# Patient Record
Sex: MALE | Race: WHITE | NOT HISPANIC OR LATINO | Employment: OTHER | URBAN - NONMETROPOLITAN AREA
[De-identification: names, ages, dates, MRNs, and addresses within clinical notes are randomized per-mention and may not be internally consistent; named-entity substitution may affect disease eponyms.]

---

## 2021-03-02 ENCOUNTER — IMMUNIZATION (OUTPATIENT)
Dept: VACCINE CLINIC | Facility: HOSPITAL | Age: 66
End: 2021-03-02

## 2021-03-02 PROCEDURE — 0001A: CPT | Performed by: INTERNAL MEDICINE

## 2021-03-02 PROCEDURE — 91300 HC SARSCOV02 VAC 30MCG/0.3ML IM: CPT | Performed by: INTERNAL MEDICINE

## 2021-03-23 ENCOUNTER — IMMUNIZATION (OUTPATIENT)
Dept: VACCINE CLINIC | Facility: HOSPITAL | Age: 66
End: 2021-03-23

## 2021-03-23 PROCEDURE — 0002A: CPT | Performed by: INTERNAL MEDICINE

## 2021-03-23 PROCEDURE — 91300 HC SARSCOV02 VAC 30MCG/0.3ML IM: CPT | Performed by: INTERNAL MEDICINE

## 2021-06-09 ENCOUNTER — TRANSCRIBE ORDERS (OUTPATIENT)
Dept: ADMINISTRATIVE | Facility: HOSPITAL | Age: 66
End: 2021-06-09

## 2021-06-09 DIAGNOSIS — R07.9 CHEST PAIN, UNSPECIFIED TYPE: Primary | ICD-10-CM

## 2021-07-12 ENCOUNTER — HOSPITAL ENCOUNTER (OUTPATIENT)
Dept: CARDIOLOGY | Facility: HOSPITAL | Age: 66
Discharge: HOME | End: 2021-07-12

## 2021-07-12 ENCOUNTER — HOSPITAL ENCOUNTER (OUTPATIENT)
Dept: NUCLEAR MEDICINE | Facility: HOSPITAL | Age: 66
Discharge: HOME | End: 2021-07-12

## 2021-07-12 DIAGNOSIS — R07.9 CHEST PAIN, UNSPECIFIED TYPE: ICD-10-CM

## 2021-07-12 LAB
BH CV ECHO MEAS - % IVS THICK: -17.5 %
BH CV ECHO MEAS - % LVPW THICK: -12.8 %
BH CV ECHO MEAS - ACS: 1.3 CM
BH CV ECHO MEAS - AI DEC SLOPE: 155 CM/SEC^2
BH CV ECHO MEAS - AI MAX PG: 47.6 MMHG
BH CV ECHO MEAS - AI MAX VEL: 345 CM/SEC
BH CV ECHO MEAS - AI P1/2T: 651.9 MSEC
BH CV ECHO MEAS - AO MAX PG (FULL): 62.2 MMHG
BH CV ECHO MEAS - AO MAX PG: 66.6 MMHG
BH CV ECHO MEAS - AO MEAN PG (FULL): 32 MMHG
BH CV ECHO MEAS - AO MEAN PG: 34 MMHG
BH CV ECHO MEAS - AO ROOT AREA (BSA CORRECTED): 1.5
BH CV ECHO MEAS - AO ROOT AREA: 9.6 CM^2
BH CV ECHO MEAS - AO ROOT DIAM: 3.5 CM
BH CV ECHO MEAS - AO V2 MAX: 408 CM/SEC
BH CV ECHO MEAS - AO V2 MEAN: 269.5 CM/SEC
BH CV ECHO MEAS - AO V2 VTI: 93.2 CM
BH CV ECHO MEAS - AVA(I,A): 0.76 CM^2
BH CV ECHO MEAS - AVA(I,D): 0.76 CM^2
BH CV ECHO MEAS - AVA(V,A): 0.81 CM^2
BH CV ECHO MEAS - AVA(V,D): 0.81 CM^2
BH CV ECHO MEAS - BSA(HAYCOCK): 2.4 M^2
BH CV ECHO MEAS - BSA: 2.4 M^2
BH CV ECHO MEAS - BZI_BMI: 30.2 KILOGRAMS/M^2
BH CV ECHO MEAS - BZI_METRIC_HEIGHT: 190.5 CM
BH CV ECHO MEAS - BZI_METRIC_WEIGHT: 109.8 KG
BH CV ECHO MEAS - EDV(CUBED): 93 ML
BH CV ECHO MEAS - EDV(MOD-SP4): 98.8 ML
BH CV ECHO MEAS - EDV(TEICH): 93.9 ML
BH CV ECHO MEAS - EF(CUBED): 64.3 %
BH CV ECHO MEAS - EF(MOD-SP4): 57.7 %
BH CV ECHO MEAS - EF(TEICH): 55.9 %
BH CV ECHO MEAS - ESV(CUBED): 33.2 ML
BH CV ECHO MEAS - ESV(MOD-SP4): 41.8 ML
BH CV ECHO MEAS - ESV(TEICH): 41.4 ML
BH CV ECHO MEAS - FS: 29 %
BH CV ECHO MEAS - IVS/LVPW: 0.89
BH CV ECHO MEAS - IVSD: 1.3 CM
BH CV ECHO MEAS - IVSS: 1.1 CM
BH CV ECHO MEAS - LA DIMENSION: 3.5 CM
BH CV ECHO MEAS - LA/AO: 0.99
BH CV ECHO MEAS - LV DIASTOLIC VOL/BSA (35-75): 41.5 ML/M^2
BH CV ECHO MEAS - LV MASS(C)D: 241.6 GRAMS
BH CV ECHO MEAS - LV MASS(C)DI: 101.5 GRAMS/M^2
BH CV ECHO MEAS - LV MASS(C)S: 114 GRAMS
BH CV ECHO MEAS - LV MASS(C)SI: 47.9 GRAMS/M^2
BH CV ECHO MEAS - LV MAX PG: 4.4 MMHG
BH CV ECHO MEAS - LV MEAN PG: 2 MMHG
BH CV ECHO MEAS - LV SYSTOLIC VOL/BSA (12-30): 17.6 ML/M^2
BH CV ECHO MEAS - LV V1 MAX: 105 CM/SEC
BH CV ECHO MEAS - LV V1 MEAN: 68.7 CM/SEC
BH CV ECHO MEAS - LV V1 VTI: 22.6 CM
BH CV ECHO MEAS - LVIDD: 4.5 CM
BH CV ECHO MEAS - LVIDS: 3.2 CM
BH CV ECHO MEAS - LVLD AP4: 7.8 CM
BH CV ECHO MEAS - LVLS AP4: 6.1 CM
BH CV ECHO MEAS - LVOT AREA (M): 3.1 CM^2
BH CV ECHO MEAS - LVOT AREA: 3.1 CM^2
BH CV ECHO MEAS - LVOT DIAM: 2 CM
BH CV ECHO MEAS - LVPWD: 1.4 CM
BH CV ECHO MEAS - LVPWS: 1.3 CM
BH CV ECHO MEAS - MV A MAX VEL: 99.4 CM/SEC
BH CV ECHO MEAS - MV E MAX VEL: 88.3 CM/SEC
BH CV ECHO MEAS - MV E/A: 0.89
BH CV ECHO MEAS - PA ACC TIME: 0.1 SEC
BH CV ECHO MEAS - PA PR(ACCEL): 36.3 MMHG
BH CV ECHO MEAS - SI(AO): 376.8 ML/M^2
BH CV ECHO MEAS - SI(CUBED): 25.1 ML/M^2
BH CV ECHO MEAS - SI(LVOT): 29.8 ML/M^2
BH CV ECHO MEAS - SI(MOD-SP4): 24 ML/M^2
BH CV ECHO MEAS - SI(TEICH): 22.1 ML/M^2
BH CV ECHO MEAS - SV(AO): 896.7 ML
BH CV ECHO MEAS - SV(CUBED): 59.7 ML
BH CV ECHO MEAS - SV(LVOT): 71 ML
BH CV ECHO MEAS - SV(MOD-SP4): 57 ML
BH CV ECHO MEAS - SV(TEICH): 52.5 ML
BH CV NUCLEAR PRIOR STUDY: 3
BH CV REST NUCLEAR ISOTOPE DOSE: 10.3 MCI
BH CV STRESS BP STAGE 1: NORMAL
BH CV STRESS BP STAGE 2: NORMAL
BH CV STRESS COMMENTS STAGE 1: NORMAL
BH CV STRESS COMMENTS STAGE 2: NORMAL
BH CV STRESS DOSE REGADENOSON STAGE 1: 0.4
BH CV STRESS DURATION MIN STAGE 1: 0
BH CV STRESS DURATION MIN STAGE 2: 4
BH CV STRESS DURATION SEC STAGE 1: 10
BH CV STRESS DURATION SEC STAGE 2: 0
BH CV STRESS HR STAGE 1: 78
BH CV STRESS HR STAGE 2: 79
BH CV STRESS NUCLEAR ISOTOPE DOSE: 30.3 MCI
BH CV STRESS PROTOCOL 1: NORMAL
BH CV STRESS RECOVERY BP: NORMAL MMHG
BH CV STRESS RECOVERY HR: 70 BPM
BH CV STRESS STAGE 1: 1
BH CV STRESS STAGE 2: 2
LV EF NUC BP: 60 %
MAXIMAL PREDICTED HEART RATE: 153 BPM
MAXIMAL PREDICTED HEART RATE: 153 BPM
PERCENT MAX PREDICTED HR: 51.63 %
STRESS BASELINE BP: NORMAL MMHG
STRESS BASELINE HR: 61 BPM
STRESS PERCENT HR: 61 %
STRESS POST PEAK BP: NORMAL MMHG
STRESS POST PEAK HR: 79 BPM
STRESS TARGET HR: 130 BPM
STRESS TARGET HR: 130 BPM

## 2021-07-12 PROCEDURE — 93306 TTE W/DOPPLER COMPLETE: CPT | Performed by: INTERNAL MEDICINE

## 2021-07-12 PROCEDURE — 93016 CV STRESS TEST SUPVJ ONLY: CPT | Performed by: INTERNAL MEDICINE

## 2021-07-12 PROCEDURE — 0 TECHNETIUM SESTAMIBI: Performed by: INTERNAL MEDICINE

## 2021-07-12 PROCEDURE — 93306 TTE W/DOPPLER COMPLETE: CPT

## 2021-07-12 PROCEDURE — A9500 TC99M SESTAMIBI: HCPCS | Performed by: INTERNAL MEDICINE

## 2021-07-12 PROCEDURE — 78452 HT MUSCLE IMAGE SPECT MULT: CPT | Performed by: INTERNAL MEDICINE

## 2021-07-12 PROCEDURE — 78452 HT MUSCLE IMAGE SPECT MULT: CPT

## 2021-07-12 PROCEDURE — 25010000002 REGADENOSON 0.4 MG/5ML SOLUTION: Performed by: INTERNAL MEDICINE

## 2021-07-12 PROCEDURE — 93017 CV STRESS TEST TRACING ONLY: CPT

## 2021-07-12 PROCEDURE — 93018 CV STRESS TEST I&R ONLY: CPT | Performed by: INTERNAL MEDICINE

## 2021-07-12 RX ADMIN — REGADENOSON 0.4 MG: 0.08 INJECTION, SOLUTION INTRAVENOUS at 09:45

## 2021-07-12 RX ADMIN — TECHNETIUM TC 99M SESTAMIBI 1 DOSE: 1 INJECTION INTRAVENOUS at 09:45

## 2021-07-12 RX ADMIN — TECHNETIUM TC 99M SESTAMIBI 1 DOSE: 1 INJECTION INTRAVENOUS at 08:40

## 2021-07-29 ENCOUNTER — HOSPITAL ENCOUNTER (OUTPATIENT)
Dept: GENERAL RADIOLOGY | Facility: HOSPITAL | Age: 66
Discharge: HOME | End: 2021-07-29
Admitting: INTERNAL MEDICINE

## 2021-07-29 ENCOUNTER — TRANSCRIBE ORDERS (OUTPATIENT)
Dept: OTHER | Facility: OTHER | Age: 66
End: 2021-07-29

## 2021-07-29 DIAGNOSIS — I38 VALVULAR HEART DISEASE: ICD-10-CM

## 2021-07-29 DIAGNOSIS — I38 VALVULAR HEART DISEASE: Primary | ICD-10-CM

## 2021-07-29 PROCEDURE — 71046 X-RAY EXAM CHEST 2 VIEWS: CPT

## 2021-07-29 PROCEDURE — 71046 X-RAY EXAM CHEST 2 VIEWS: CPT | Performed by: RADIOLOGY

## 2021-08-17 NOTE — H&P (VIEW-ONLY)
"[facility] at [facility]  INITIAL OFFICE CONSULT    Ron Villa  : 1955  MRN:[id number]  Patient Address: [address] [place] [address] [address] [place] [address]    Date of Encounter: 2021    PCP: Geovanni Greene MD  [address] [facility]  [place] [address]  Referring MD: Dr. Greene     IDENTIFICATION: A 66 y.o. male resident of [place], [place]     Chief Complaint   Patient presents with   • Chest Pain     Consult   • Shortness of Breath     PROBLEM LIST:   1. Aortic stenosis  a. \"Heart murmur\" x 20 years   b. Symptoms of worsening chest pain and shortness of breath summer 2021  c. Stress MPS 21: normal LVEF, small-sized mild degree of ischemia located in inferior wall; \"low-risk study\"  d. Echo 21: EF 66-70%, moderate calcification of the aortic valve, severe AS (peak velocity 408cm/sec, mean gradient 34mmHg, peak gradient 67 mmHg), mild MR   2. Osteoarthritis   3. DANNY on CPAP     ALLERGIES: No Known Allergies    Current Outpatient Medications   Medication Instructions   • aspirin 81 mg, Oral, Daily   • cholecalciferol (VITAMIN D3) 1,000 Units, Oral, Daily   • fexofenadine (ALLEGRA) 180 mg, Oral, Daily   • fish oil 1,000 mg, Oral, Daily With Breakfast   • meloxicam (MOBIC) 7.5 mg, Oral, 2 Times Daily   • vitamin B-12 (CYANOCOBALAMIN) 1,000 mcg, Oral, Daily   • vitamin C (ASCORBIC ACID) 250 mg, Oral, Daily     HPI: Mr. Villa is a pleasant 67 y/o WM with above noted history who is referred in consultation today for aortic stenosis. He has been told of a heart murmur for about 20 years, and reports a remote heart cath over 10 years ago which was normal. Records of this are unavailable at this time. He has been experiencing chest pain and exertional dyspnea for 6 months to a year, however this has progressively gotten worse and more frequent, and now occurs at rest. He recently underwent a stress MPS and echo which demonstrated criteria for severe AS, normal LVEF. Stress test showed small " "area of ischemia in the inferior wall. He notes his chest pain used to happen mostly with activity, however last night it occurred while he was sitting eating dinner. His pain resolves within <5 minutes. He denies syncope or pre-syncope and no heart failure symptoms. He denies prior history of cardiovascular, cerebrovascular or peripheral vascular disease. No tobacco, moderate alcohol use, and no drug use.     Cardiac Risk Factors include: advanced age (older than 55 for men, 65 for women), male gender, obesity (BMI >= 30 kg/m2) and sedentary lifestyle    ROS: All systems have been reviewed and are negative with the exception of those mentioned in the HPI and problem list above.    Past Surgical History:   Procedure Laterality Date   • HERNIA REPAIR       Social History     Socioeconomic History   • Marital status:    Tobacco Use   • Smoking status: Former Smoker     Types: Cigarettes     Quit date:      Years since quittin.6   • Smokeless tobacco: Current User     Types: Chew   Vaping Use   • Vaping Use: Never used   Substance and Sexual Activity   • Alcohol use: Yes     Comment: 2 qd   • Drug use: Not Currently   • Sexual activity: Defer     Family History   Problem Relation Age of Onset   • Heart disease Mother    • Heart attack Father        Objective     Vitals:    21 1110 21 1111 21 1112 21 1113   BP: 137/82 134/79 133/81 137/97   BP Location: Left arm Left arm Right arm Right arm   Patient Position: Sitting Standing Sitting Standing   Pulse: 63 65 62 67   SpO2: 96%      Weight: 113 kg (250 lb)      Height: 190.5 cm (75\")        Body mass index is 31.25 kg/m².    PHYSICAL EXAM:  CONSTITUTIONAL: Well nourished, cooperative, in no acute distress  HEENT: Normocephalic, atraumatic, PERRLA, no JVD  CARDIOVASCULAR:  Regular rhythm and normal rate, 3/6 systolic murmur, no gallop or rub.   RESPIRATORY: Clear to auscultation, normal respiratory effort, no wheezing, rales or " sanamgonzález  GI: Soft, nontender, normal bowel sounds  EXTREMITIES: No gross deformities, trace pre-tibial edema. Peripheral pulses are present and equal bilaterally  SKIN: Warm, dry. No bleeding, bruising or rash  NEUROLOGICAL: No focal deficits  PSYCHIATRIC: Normal mood and affect. Behavior is normal     Labs/Diagnostic Data  Labs 3/2020:  TSH 1.830, Free T4 1.3  Lipid Panel: , HDL 32, LDL 83, Trig 229  CMP: Glucose 90, Na 142, K 4.8, Cl 105, CO2 22, BUN 14, Cr 1.11, eGFR 69, Alk Phos 45, AST 22, ALT 28  CBC: WBC 4.8, RBC 5.12, Hgb 16, Hct 48, Plt 207      ECG 12 Lead    Date/Time: 8/18/2021 12:36 PM  Performed by: Janette Dunn PA-C  Authorized by: Janette Dunn PA-C   Comparison: not compared with previous ECG   Previous ECG: no previous ECG available  Rhythm: sinus rhythm  Rate: normal  BPM: 63  Conduction: conduction normal  QRS axis: normal    Clinical impression: normal ECG        Radiology Data:   CXR 7/29/21:  IMPRESSION:  No evidence of active disease in the chest.    Assessment and Plan:     1. Aortic stenosis: echo criteria consistent with severe AS. He has worsening symptoms of chest pain and exertional dyspnea as well as less frequent rest SOA and chest disconfort and needs a cardiac cath for further evaluation prior to probable valve replacement.  We will proceed with right and C next week. He has rest symptoms of chest pain which are bothersome. Long tald with the patient and his wife re TAVR and SAVR.  2. Osteoarthritis: on Mobic. We will re-check renal function at time of cath.  3. Final disposition to follow.       Scribed for Power Burgos MD by Janette Dunn PA-C. 8/18/2021  11:50 EDT

## 2021-08-17 NOTE — PROGRESS NOTES
"[facility] at [facility]  INITIAL OFFICE CONSULT    Ron Villa  : 1955  MRN:[id number]  Patient Address: [address] [place] [address] [place] [address]    Date of Encounter: 2021    PCP: Geovanni Greene MD  [address] [facility]  [place] [address]  Referring MD: Dr. Greene     IDENTIFICATION: A 66 y.o. male resident of [place], [place]     Chief Complaint   Patient presents with   • Chest Pain     Consult   • Shortness of Breath     PROBLEM LIST:   1. Aortic stenosis  a. \"Heart murmur\" x 20 years   b. Symptoms of worsening chest pain and shortness of breath summer 2021  c. Stress MPS 21: normal LVEF, small-sized mild degree of ischemia located in inferior wall; \"low-risk study\"  d. Echo 21: EF 66-70%, moderate calcification of the aortic valve, severe AS (peak velocity 408cm/sec, mean gradient 34mmHg, peak gradient 67 mmHg), mild MR   2. Osteoarthritis   3. DANNY on CPAP     ALLERGIES: No Known Allergies    Current Outpatient Medications   Medication Instructions   • aspirin 81 mg, Oral, Daily   • cholecalciferol (VITAMIN D3) 1,000 Units, Oral, Daily   • fexofenadine (ALLEGRA) 180 mg, Oral, Daily   • fish oil 1,000 mg, Oral, Daily With Breakfast   • meloxicam (MOBIC) 7.5 mg, Oral, 2 Times Daily   • vitamin B-12 (CYANOCOBALAMIN) 1,000 mcg, Oral, Daily   • vitamin C (ASCORBIC ACID) 250 mg, Oral, Daily     HPI: Mr. Villa is a pleasant 67 y/o WM with above noted history who is referred in consultation today for aortic stenosis. He has been told of a heart murmur for about 20 years, and reports a remote heart cath over 10 years ago which was normal. Records of this are unavailable at this time. He has been experiencing chest pain and exertional dyspnea for 6 months to a year, however this has progressively gotten worse and more frequent, and now occurs at rest. He recently underwent a stress MPS and echo which demonstrated criteria for severe AS, normal LVEF. Stress test showed small " "area of ischemia in the inferior wall. He notes his chest pain used to happen mostly with activity, however last night it occurred while he was sitting eating dinner. His pain resolves within <5 minutes. He denies syncope or pre-syncope and no heart failure symptoms. He denies prior history of cardiovascular, cerebrovascular or peripheral vascular disease. No tobacco, moderate alcohol use, and no drug use.     Cardiac Risk Factors include: advanced age (older than 55 for men, 65 for women), male gender, obesity (BMI >= 30 kg/m2) and sedentary lifestyle    ROS: All systems have been reviewed and are negative with the exception of those mentioned in the HPI and problem list above.    Past Surgical History:   Procedure Laterality Date   • HERNIA REPAIR       Social History     Socioeconomic History   • Marital status:    Tobacco Use   • Smoking status: Former Smoker     Types: Cigarettes     Quit date:      Years since quittin.6   • Smokeless tobacco: Current User     Types: Chew   Vaping Use   • Vaping Use: Never used   Substance and Sexual Activity   • Alcohol use: Yes     Comment: 2 qd   • Drug use: Not Currently   • Sexual activity: Defer     Family History   Problem Relation Age of Onset   • Heart disease Mother    • Heart attack Father        Objective     Vitals:    21 1110 21 1111 21 1112 21 1113   BP: 137/82 134/79 133/81 137/97   BP Location: Left arm Left arm Right arm Right arm   Patient Position: Sitting Standing Sitting Standing   Pulse: 63 65 62 67   SpO2: 96%      Weight: 113 kg (250 lb)      Height: 190.5 cm (75\")        Body mass index is 31.25 kg/m².    PHYSICAL EXAM:  CONSTITUTIONAL: Well nourished, cooperative, in no acute distress  HEENT: Normocephalic, atraumatic, PERRLA, no JVD  CARDIOVASCULAR:  Regular rhythm and normal rate, 3/6 systolic murmur, no gallop or rub.   RESPIRATORY: Clear to auscultation, normal respiratory effort, no wheezing, rales or " sanamgonzález  GI: Soft, nontender, normal bowel sounds  EXTREMITIES: No gross deformities, trace pre-tibial edema. Peripheral pulses are present and equal bilaterally  SKIN: Warm, dry. No bleeding, bruising or rash  NEUROLOGICAL: No focal deficits  PSYCHIATRIC: Normal mood and affect. Behavior is normal     Labs/Diagnostic Data  Labs 3/2020:  TSH 1.830, Free T4 1.3  Lipid Panel: , HDL 32, LDL 83, Trig 229  CMP: Glucose 90, Na 142, K 4.8, Cl 105, CO2 22, BUN 14, Cr 1.11, eGFR 69, Alk Phos 45, AST 22, ALT 28  CBC: WBC 4.8, RBC 5.12, Hgb 16, Hct 48, Plt 207      ECG 12 Lead    Date/Time: 8/18/2021 12:36 PM  Performed by: Janette Dunn PA-C  Authorized by: Janette Dunn PA-C   Comparison: not compared with previous ECG   Previous ECG: no previous ECG available  Rhythm: sinus rhythm  Rate: normal  BPM: 63  Conduction: conduction normal  QRS axis: normal    Clinical impression: normal ECG        Radiology Data:   CXR 7/29/21:  IMPRESSION:  No evidence of active disease in the chest.    Assessment and Plan:     1. Aortic stenosis: echo criteria consistent with severe AS. He has worsening symptoms of chest pain and exertional dyspnea as well as less frequent rest SOA and chest disconfort and needs a cardiac cath for further evaluation prior to probable valve replacement.  We will proceed with right and C next week. He has rest symptoms of chest pain which are bothersome. Long tald with the patient and his wife re TAVR and SAVR.  2. Osteoarthritis: on Mobic. We will re-check renal function at time of cath.  3. Final disposition to follow.       Scribed for Power Burgos MD by Janette Dunn PA-C. 8/18/2021  11:50 EDT

## 2021-08-18 ENCOUNTER — OFFICE VISIT (OUTPATIENT)
Dept: CARDIOLOGY | Facility: CLINIC | Age: 66
End: 2021-08-18

## 2021-08-18 ENCOUNTER — TRANSCRIBE ORDERS (OUTPATIENT)
Dept: ADMINISTRATIVE | Facility: HOSPITAL | Age: 66
End: 2021-08-18

## 2021-08-18 VITALS
HEIGHT: 75 IN | WEIGHT: 250 LBS | OXYGEN SATURATION: 96 % | DIASTOLIC BLOOD PRESSURE: 97 MMHG | BODY MASS INDEX: 31.08 KG/M2 | SYSTOLIC BLOOD PRESSURE: 137 MMHG | HEART RATE: 67 BPM

## 2021-08-18 DIAGNOSIS — Z01.818 OTHER SPECIFIED PRE-OPERATIVE EXAMINATION: Primary | ICD-10-CM

## 2021-08-18 DIAGNOSIS — R07.9 CHEST PAIN, UNSPECIFIED TYPE: ICD-10-CM

## 2021-08-18 DIAGNOSIS — I35.0 AORTIC STENOSIS, SEVERE: Primary | ICD-10-CM

## 2021-08-18 DIAGNOSIS — R06.09 EXERTIONAL DYSPNEA: ICD-10-CM

## 2021-08-18 PROCEDURE — 93000 ELECTROCARDIOGRAM COMPLETE: CPT | Performed by: INTERNAL MEDICINE

## 2021-08-18 PROCEDURE — 99204 OFFICE O/P NEW MOD 45 MIN: CPT | Performed by: INTERNAL MEDICINE

## 2021-08-18 RX ORDER — MELOXICAM 7.5 MG/1
7.5 TABLET ORAL DAILY
COMMUNITY
Start: 2021-07-16 | End: 2022-01-21

## 2021-08-18 RX ORDER — MULTIVIT WITH MINERALS/LUTEIN
500 TABLET ORAL DAILY
COMMUNITY

## 2021-08-18 RX ORDER — FEXOFENADINE HCL 180 MG/1
180 TABLET ORAL DAILY
COMMUNITY
Start: 2021-07-16

## 2021-08-18 RX ORDER — LANOLIN ALCOHOL/MO/W.PET/CERES
1000 CREAM (GRAM) TOPICAL DAILY
COMMUNITY

## 2021-08-18 RX ORDER — ASPIRIN 81 MG/1
81 TABLET ORAL DAILY
COMMUNITY

## 2021-08-24 ENCOUNTER — TRANSCRIBE ORDERS (OUTPATIENT)
Dept: ADMINISTRATIVE | Facility: HOSPITAL | Age: 66
End: 2021-08-24

## 2021-08-24 ENCOUNTER — LAB (OUTPATIENT)
Dept: LAB | Facility: HOSPITAL | Age: 66
End: 2021-08-24

## 2021-08-24 DIAGNOSIS — Z01.818 OTHER SPECIFIED PRE-OPERATIVE EXAMINATION: ICD-10-CM

## 2021-08-24 LAB — SARS-COV-2 RNA PNL SPEC NAA+PROBE: NOT DETECTED

## 2021-08-24 PROCEDURE — U0004 COV-19 TEST NON-CDC HGH THRU: HCPCS | Performed by: INTERNAL MEDICINE

## 2021-08-24 PROCEDURE — C9803 HOPD COVID-19 SPEC COLLECT: HCPCS

## 2021-08-26 ENCOUNTER — PREP FOR SURGERY (OUTPATIENT)
Dept: OTHER | Facility: HOSPITAL | Age: 66
End: 2021-08-26

## 2021-08-26 RX ORDER — NITROGLYCERIN 0.4 MG/1
0.4 TABLET SUBLINGUAL
Status: CANCELLED | OUTPATIENT
Start: 2021-08-26

## 2021-08-26 RX ORDER — SODIUM CHLORIDE 0.9 % (FLUSH) 0.9 %
3 SYRINGE (ML) INJECTION EVERY 12 HOURS SCHEDULED
Status: CANCELLED | OUTPATIENT
Start: 2021-08-26

## 2021-08-26 RX ORDER — SODIUM CHLORIDE 0.9 % (FLUSH) 0.9 %
10 SYRINGE (ML) INJECTION AS NEEDED
Status: CANCELLED | OUTPATIENT
Start: 2021-08-26

## 2021-08-26 RX ORDER — ACETAMINOPHEN 325 MG/1
650 TABLET ORAL EVERY 4 HOURS PRN
Status: CANCELLED | OUTPATIENT
Start: 2021-08-26

## 2021-08-27 ENCOUNTER — HOSPITAL ENCOUNTER (OUTPATIENT)
Facility: HOSPITAL | Age: 66
Discharge: HOME | End: 2021-08-27
Attending: INTERNAL MEDICINE | Admitting: INTERNAL MEDICINE

## 2021-08-27 VITALS
DIASTOLIC BLOOD PRESSURE: 86 MMHG | BODY MASS INDEX: 30.54 KG/M2 | OXYGEN SATURATION: 94 % | HEIGHT: 75 IN | SYSTOLIC BLOOD PRESSURE: 136 MMHG | HEART RATE: 61 BPM | TEMPERATURE: 98 F | WEIGHT: 245.59 LBS | RESPIRATION RATE: 20 BRPM

## 2021-08-27 DIAGNOSIS — R06.09 EXERTIONAL DYSPNEA: ICD-10-CM

## 2021-08-27 DIAGNOSIS — I35.0 AORTIC STENOSIS, SEVERE: ICD-10-CM

## 2021-08-27 DIAGNOSIS — R07.9 CHEST PAIN, UNSPECIFIED TYPE: ICD-10-CM

## 2021-08-27 LAB
ALBUMIN SERPL-MCNC: 4.2 G/DL (ref 3.5–5.2)
ALBUMIN/GLOB SERPL: 1.8 G/DL
ALP SERPL-CCNC: 45 U/L (ref 39–117)
ALT SERPL W P-5'-P-CCNC: 29 U/L (ref 1–41)
ANION GAP SERPL CALCULATED.3IONS-SCNC: 9 MMOL/L (ref 5–15)
AST SERPL-CCNC: 20 U/L (ref 1–40)
BILIRUB SERPL-MCNC: 0.7 MG/DL (ref 0–1.2)
BUN SERPL-MCNC: 12 MG/DL (ref 8–23)
BUN/CREAT SERPL: 11.4 (ref 7–25)
CALCIUM SPEC-SCNC: 9.4 MG/DL (ref 8.6–10.5)
CHLORIDE SERPL-SCNC: 102 MMOL/L (ref 98–107)
CHOLEST SERPL-MCNC: 167 MG/DL (ref 0–200)
CO2 SERPL-SCNC: 27 MMOL/L (ref 22–29)
CREAT SERPL-MCNC: 1.05 MG/DL (ref 0.76–1.27)
DEPRECATED RDW RBC AUTO: 46.3 FL (ref 37–54)
ERYTHROCYTE [DISTWIDTH] IN BLOOD BY AUTOMATED COUNT: 13 % (ref 12.3–15.4)
GFR SERPL CREATININE-BSD FRML MDRD: 70 ML/MIN/1.73
GLOBULIN UR ELPH-MCNC: 2.4 GM/DL
GLUCOSE SERPL-MCNC: 104 MG/DL (ref 65–99)
HBA1C MFR BLD: 5.5 % (ref 4.8–5.6)
HCT VFR BLD AUTO: 49.1 % (ref 37.5–51)
HDLC SERPL-MCNC: 39 MG/DL (ref 40–60)
HGB BLD-MCNC: 16.5 G/DL (ref 13–17.7)
LDLC SERPL CALC-MCNC: 111 MG/DL (ref 0–100)
LDLC/HDLC SERPL: 2.83 {RATIO}
MCH RBC QN AUTO: 32.4 PG (ref 26.6–33)
MCHC RBC AUTO-ENTMCNC: 33.6 G/DL (ref 31.5–35.7)
MCV RBC AUTO: 96.5 FL (ref 79–97)
PLATELET # BLD AUTO: 187 10*3/MM3 (ref 140–450)
PMV BLD AUTO: 10.4 FL (ref 6–12)
POTASSIUM SERPL-SCNC: 4.6 MMOL/L (ref 3.5–5.2)
PROT SERPL-MCNC: 6.6 G/DL (ref 6–8.5)
RBC # BLD AUTO: 5.09 10*6/MM3 (ref 4.14–5.8)
SODIUM SERPL-SCNC: 138 MMOL/L (ref 136–145)
TRIGL SERPL-MCNC: 89 MG/DL (ref 0–150)
VLDLC SERPL-MCNC: 17 MG/DL (ref 5–40)
WBC # BLD AUTO: 5.91 10*3/MM3 (ref 3.4–10.8)

## 2021-08-27 PROCEDURE — C1769 GUIDE WIRE: HCPCS | Performed by: INTERNAL MEDICINE

## 2021-08-27 PROCEDURE — C1894 INTRO/SHEATH, NON-LASER: HCPCS | Performed by: INTERNAL MEDICINE

## 2021-08-27 PROCEDURE — 83036 HEMOGLOBIN GLYCOSYLATED A1C: CPT | Performed by: PHYSICIAN ASSISTANT

## 2021-08-27 PROCEDURE — 99204 OFFICE O/P NEW MOD 45 MIN: CPT | Performed by: THORACIC SURGERY (CARDIOTHORACIC VASCULAR SURGERY)

## 2021-08-27 PROCEDURE — 85027 COMPLETE CBC AUTOMATED: CPT | Performed by: PHYSICIAN ASSISTANT

## 2021-08-27 PROCEDURE — C1760 CLOSURE DEV, VASC: HCPCS | Performed by: INTERNAL MEDICINE

## 2021-08-27 PROCEDURE — 0 IOPAMIDOL PER 1 ML: Performed by: INTERNAL MEDICINE

## 2021-08-27 PROCEDURE — 93456 R HRT CORONARY ARTERY ANGIO: CPT | Performed by: INTERNAL MEDICINE

## 2021-08-27 PROCEDURE — 80061 LIPID PANEL: CPT | Performed by: PHYSICIAN ASSISTANT

## 2021-08-27 PROCEDURE — 80053 COMPREHEN METABOLIC PANEL: CPT | Performed by: PHYSICIAN ASSISTANT

## 2021-08-27 PROCEDURE — 25010000002 MIDAZOLAM PER 1 MG: Performed by: INTERNAL MEDICINE

## 2021-08-27 PROCEDURE — C1751 CATH, INF, PER/CENT/MIDLINE: HCPCS | Performed by: INTERNAL MEDICINE

## 2021-08-27 PROCEDURE — 25010000002 FENTANYL CITRATE (PF) 50 MCG/ML SOLUTION: Performed by: INTERNAL MEDICINE

## 2021-08-27 RX ORDER — LIDOCAINE HYDROCHLORIDE 10 MG/ML
INJECTION, SOLUTION EPIDURAL; INFILTRATION; INTRACAUDAL; PERINEURAL AS NEEDED
Status: DISCONTINUED | OUTPATIENT
Start: 2021-08-27 | End: 2021-08-27 | Stop reason: HOSPADM

## 2021-08-27 RX ORDER — SODIUM CHLORIDE 9 MG/ML
330 INJECTION, SOLUTION INTRAVENOUS CONTINUOUS
Status: DISCONTINUED | OUTPATIENT
Start: 2021-08-27 | End: 2021-08-27 | Stop reason: HOSPADM

## 2021-08-27 RX ORDER — ACETAMINOPHEN 325 MG/1
650 TABLET ORAL EVERY 4 HOURS PRN
Status: DISCONTINUED | OUTPATIENT
Start: 2021-08-27 | End: 2021-08-27 | Stop reason: HOSPADM

## 2021-08-27 RX ORDER — MIDAZOLAM HYDROCHLORIDE 1 MG/ML
INJECTION INTRAMUSCULAR; INTRAVENOUS AS NEEDED
Status: DISCONTINUED | OUTPATIENT
Start: 2021-08-27 | End: 2021-08-27 | Stop reason: HOSPADM

## 2021-08-27 RX ORDER — FENTANYL CITRATE 50 UG/ML
INJECTION, SOLUTION INTRAMUSCULAR; INTRAVENOUS AS NEEDED
Status: DISCONTINUED | OUTPATIENT
Start: 2021-08-27 | End: 2021-08-27 | Stop reason: HOSPADM

## 2021-08-27 RX ORDER — SODIUM CHLORIDE 0.9 % (FLUSH) 0.9 %
3 SYRINGE (ML) INJECTION EVERY 12 HOURS SCHEDULED
Status: DISCONTINUED | OUTPATIENT
Start: 2021-08-27 | End: 2021-08-27 | Stop reason: HOSPADM

## 2021-08-27 RX ORDER — NITROGLYCERIN 0.4 MG/1
0.4 TABLET SUBLINGUAL
Status: DISCONTINUED | OUTPATIENT
Start: 2021-08-27 | End: 2021-08-27 | Stop reason: HOSPADM

## 2021-08-27 RX ORDER — SODIUM CHLORIDE 0.9 % (FLUSH) 0.9 %
10 SYRINGE (ML) INJECTION AS NEEDED
Status: DISCONTINUED | OUTPATIENT
Start: 2021-08-27 | End: 2021-08-27 | Stop reason: HOSPADM

## 2021-08-27 RX ADMIN — ACETAMINOPHEN 650 MG: 325 TABLET, FILM COATED ORAL at 14:54

## 2021-08-27 RX ADMIN — SODIUM CHLORIDE 330 ML/HR: 9 INJECTION, SOLUTION INTRAVENOUS at 09:49

## 2021-08-27 NOTE — PLAN OF CARE
Goal Outcome Evaluation:  Plan of Care Reviewed With: patient, spouse        Progress: improving  Outcome Summary: Patient ambulating independently in hallway after bedrest. R groin site remains clean/dry/intact. VSS on room air. C/o chest pain soreness that resolved with tylenol. Discharge teaching completed. CT nurse navigator called back to unit states she will call patient Monday with appointment for TAVR and details.

## 2021-08-27 NOTE — INTERVAL H&P NOTE
"  H&P reviewed. The patient was examined and there are no changes to the H&P.      Vitals and Labs today:  Vitals:    08/27/21 0925 08/27/21 0927   BP: 138/87 134/83   BP Location: Right arm Left arm   Patient Position: Lying Lying   Pulse: 62    Resp: 16    Temp: 98 °F (36.7 °C)    TempSrc: Tympanic    SpO2: 98%    Weight: 111 kg (245 lb 9.5 oz)    Height: 190.5 cm (75\")      Lab Results   Component Value Date    WBC 5.91 08/27/2021    HGB 16.5 08/27/2021    HCT 49.1 08/27/2021    MCV 96.5 08/27/2021     08/27/2021     Lab Results   Component Value Date    GLUCOSE 104 (H) 08/27/2021    BUN 12 08/27/2021    CREATININE 1.05 08/27/2021    EGFRIFNONA 70 08/27/2021    BCR 11.4 08/27/2021    K 4.6 08/27/2021    CO2 27.0 08/27/2021    CALCIUM 9.4 08/27/2021    ALBUMIN 4.20 08/27/2021    AST 20 08/27/2021    ALT 29 08/27/2021     Lab Results   Component Value Date    CHOL 167 08/27/2021    TRIG 89 08/27/2021    HDL 39 (L) 08/27/2021     (H) 08/27/2021     No results found for: TSH  No results found for: HGBA1C    Echo 7/12/21:  Interpretation Summary    · Normal left ventricular cavity size noted. Left ventricular wall thickness is consistent with concentric hypertrophy.  · Left ventricular ejection fraction appears to be 66 - 70%.  · Left ventricular diastolic function is consistent with (grade I) impaired relaxation.  · The aortic valve is abnormal in structure. A bicuspid aortic valve cannot be excluded. There is moderate calcification of the aortic valve. Mild aortic valve regurgitation is present. Moderate to severe aortic valve stenosis is present.  · Stenosis Ao max PG 66.6 mmHg Ao mean PG 34 mmHg Ao V2 VTI 93.2 cm DENNY(I,D) 0.76 cm^2 Regurgitation AI P1/2t 651.9 msec  · The mitral valve is structurally normal with no significant stenosis present. Mild mitral valve regurgitation is present.  · There is no evidence of pericardial effusion.  · Comments:May consider a transesophageal echocardiographic " study to have a better look at the aortic valve valve       Assessment:  · 67 y/o WM with severe AS by echo  (peak velocity 408cm/sec, mean gradient 34mmHg, peak gradient 67 mmHg) normal LVEF. He is symptomatic with worsening chest pain and exertional dyspnea. Symptoms of chest pain have occurred at rest recently.     Plan:  · Right and LHC +/- CBI. Risks, benefits and alternatives to the procedure explained to the patient and he understands and wishes to proceed.       Electronically signed by Janette Dunn PA-C, 08/27/21, 9:28 AM EDT.

## 2021-08-27 NOTE — CONSULTS
CTS Consult    Patient Care Team:  Geovanni Greene MD as PCP - General (Internal Medicine)      Reason for Consult: Severe aortic stenosis    HPI  Patient is a 66 y.o. male with history of chronic back pain and GERD who presents to [facility] today for a scheduled left heart catheterization.  He states that he has had exertional dyspnea for almost a year.  He states it is gotten worse in the last few months.  He states that it sometimes occurs even at rest.  States it usually resolves within about 5 minutes or so.  Denies chest pain, palpitations, syncope, or diaphoresis with this.  He states he has known about his heart murmur for about 20 years.  Patient denies tobacco use and is not currently on any form of anticoagulant medication.  C today revealed severe aortic stenosis.  CT surgery services were consulted to evaluate patient for possible TAVR procedure.  At time of evaluation, patient is symptom-free. Last echo was 7/12/2021.      Review of Systems    A comprehensive review of systems was negative except for:   Constitutional: Denies fever, chills, headache.  Respiratory: Admits to history of exertional dyspnea.  Denies cough or wheezing.  Cardiovascular: Denies chest pain, palpitations, or syncope.  Gastrointestinal: Denies abdominal pain, nausea, vomiting, diarrhea.  Hematologic/lymphatic: Denies known bleeding disorder.  Not currently anticoagulated.  Musculoskeletal: Admits to ongoing back pain.  Denies new acute injury or trauma.  Neurological: Denies unilateral weakness or numbness.  Denies acute change in vision or speech.      History  Past Medical History:   Diagnosis Date   • Arthritis    • GERD (gastroesophageal reflux disease)    • Heart murmur      Past Surgical History:   Procedure Laterality Date   • CARDIAC CATHETERIZATION     • HERNIA REPAIR       Family History   Problem Relation Age of Onset   • Heart disease Mother    • Heart attack Father      Social History     Tobacco Use   •  Smoking status: Former Smoker     Types: Cigarettes     Quit date:      Years since quittin.6   • Smokeless tobacco: Current User     Types: Chew   Vaping Use   • Vaping Use: Never used   Substance Use Topics   • Alcohol use: Yes     Alcohol/week: 2.0 standard drinks     Types: 2 Cans of beer per week     Comment: daily   • Drug use: Not Currently     Medications Prior to Admission   Medication Sig Dispense Refill Last Dose   • aspirin 81 MG EC tablet Take 81 mg by mouth Daily.   2021 at Unknown time   • cholecalciferol (VITAMIN D3) 25 MCG (1000 UT) tablet Take 1,000 Units by mouth Daily.   2021 at Unknown time   • fexofenadine (ALLEGRA) 180 MG tablet Take 180 mg by mouth Daily.   2021 at Unknown time   • meloxicam (MOBIC) 7.5 MG tablet Take 7.5 mg by mouth 2 (Two) Times a Day.   2021 at Unknown time   • Omega-3 Fatty Acids (fish oil) 1000 MG capsule capsule Take 1,000 mg by mouth Daily With Breakfast.   2021 at Unknown time   • vitamin B-12 (CYANOCOBALAMIN) 1000 MCG tablet Take 1,000 mcg by mouth Daily.   2021 at Unknown time   • vitamin C (ASCORBIC ACID) 250 MG tablet Take 250 mg by mouth Daily.   2021 at Unknown time     Allergies:  Patient has no known allergies.    Objective    Vital Signs  Temp:  [98 °F (36.7 °C)] 98 °F (36.7 °C)  Heart Rate:  [62-73] 67  Resp:  [16-18] 18  BP: (133-142)/(76-89) 133/76    Physical Exam:  General Appearance: alert, pleasant, appears stated age and cooperative  Head: normocephalic, without obvious abnormality and atraumatic  Neck: no adenopathy, supple, trachea midline, no thyromegaly, no carotid bruit and no JVD  Lungs: clear to auscultation, respirations regular, respirations even and respirations unlabored  Heart: Regular rate and rhythm.  Notable systolic murmur.  Abdomen: normal bowel sounds, no masses, no hepatomegaly, no splenomegaly, soft non-tender, no guarding and no rebound tenderness  Extremities: moves extremities well, no  edema, no cyanosis and no redness  Pulses: Pulses palpable and equal bilaterally  Skin: no bleeding, bruising or rash  Neurologic: Mental Status orientated to person, place, time and situation, Cranial Nerves cranial nerves 2 - 12 grossly intact as examined          Data Review:  Results from last 7 days   Lab Units 08/27/21  0927   WBC 10*3/mm3 5.91   HEMOGLOBIN g/dL 16.5   HEMATOCRIT % 49.1   PLATELETS 10*3/mm3 187     Results from last 7 days   Lab Units 08/27/21  0927   SODIUM mmol/L 138   POTASSIUM mmol/L 4.6   CHLORIDE mmol/L 102   CO2 mmol/L 27.0   BUN mg/dL 12   CREATININE mg/dL 1.05   GLUCOSE mg/dL 104*   CALCIUM mg/dL 9.4     Coagulation: No results found for: INR, APTT  Cardiac markers:     ABGs:       Invalid input(s): PO2      Cardiac Catheterization:   Referring Provider: Geovanni Greene M.D.     Procedures Performed: Right heart catheterization with cardiac output determination utilizing the thermodilution technique.  Bilateral selective coronary angiography.  Closure of the right common femoral vein with an 8 Pan[place]ian Angio-Seal device and the right common femoral artery with a 6 [place]ian Angio-Seal device     Indications: The patient is a 66-year-old man with angina and noninvasive (transthoracic echocardiogram) evidence of severe valvular aortic stenosis.  Study with intervention standby is noted.     Procedure Narrative: Informed consent was obtained.  The patient was transported to the [facility] catheterization laboratories in the postabsorptive state.  Conscious sedation was initiated with fentanyl and Versed.  Local anesthesia over the right common femoral artery and vein was carried out utilizing infiltrated 1% lidocaine.  Utilizing radiographic guidance and the percutaneous technique of Seldinger, I then advanced a 6 [place]ian arterial sheath into the right common femoral artery and an 8 [place]ian venous sheath into the right common femoral vein.  Under fluoroscopic guidance, I advanced a 7.5  "[place]in Nix [place] catheter through the inferior vena cava, right heart, and into the pulmonary artery pulmonary capillary wedge positions or pressures were being recorded.  Cardiac output determination was then carried out using the thermodilution technique.  The [place] catheter was then removed.  I then advanced, sequentially, 6 Mont[place]grin left and right Priti catheters into the ascending aorta.  Ascending aortic pressure was measured.  Bilateral selective multiplane coronary angiography was carried out.  The procedure was then terminated.  All catheters were removed.  Following the review of an acceptable vascular access site angiogram, I closed the right common femoral vein with an 8 Mont[place]grin Angio-Seal device and the right common femoral artery with a 6 [place]grin Angio-Seal device.  The patient was then transported to cardiovascular observation unit for recovery.     Complications:  There were no signs of early complications.     Hemodynamic Findings:  · AO pressure: 130/60 mmHg   · RA pressure, mean: 7 mmHg  · RV pressure: 36/7 mmHg  · PA pressure: 36/10, mean = 20 mmHg  · PCWP pressure, mean = 13 mmHg  · CO (TD) = 6.8 L/min  · CI = 2.8 L/min/m²  · HR = 68 bpm (NSR)        Angiographic Findings:  Left Ventriculography: Deferred.  Left ventricular systolic function is \"normal\" based on transthoracic echocardiography.  The aortic valve is severely calcified.        Selective Coronary Angiography:  · LM: Normal.  · LAD: The vessel gives rise to a moderate sized first diagonal branch proximally.  The proximal LAD is ectatic.  Obstructive disease is not seen.  · LCX: This is a very large nondominant vessel gives rise to 3 large marginal arteries of the posterior left ventricular wall.  Trivial proximal ectasia is noted.  Obstructive disease not seen.  · RCA: This is a large dominant vessel it gives rise to posterior descending and posterolateral branches.  This vessel is angiographically normal.     Final " Impression: #1: Normal left ventricular systolic function (noninvasive data).  Forward cardiac output and index are normal.  Right heart pressures are normal.                             #2:  Essentially normal coronary arteries.                             #3:  This patient has symptomatic severe (by noninvasive/invasive evaluation) valvular aortic stenosis.           Imaging Results (Last 72 Hours)     ** No results found for the last 72 hours. **            Assessment:        Aortic stenosis, severe    Chest pain    Exertional dyspnea        Plan:  Patient is a 66-year-old male with history of GERD and arthritis with no real cardiac history who presents to [facility] today for a scheduled left heart cath secondary to worsening exertional dyspnea.  C revealed severe aortic stenosis.  Patient is a candidate for a TAVR procedure.     I have personally reviewed the cardiac catheterization films.  The films revealed no evidence of any significant coronary artery disease.  I discussed the case in detail and examined the patient.  I discussed the case with Dr. Power Burgos.  We both feel that TAVR would be his best option.  I have attempted to contact Gabriella TAVR coordinator but she is not available today.  We will arrange this in the near future.  Like thank you for this consultation.  Angelique Chacon PA-C  08/27/21  15:15 EDT

## 2021-08-30 DIAGNOSIS — R06.09 EXERTIONAL DYSPNEA: ICD-10-CM

## 2021-08-30 DIAGNOSIS — R07.2 PRECORDIAL PAIN: ICD-10-CM

## 2021-08-30 DIAGNOSIS — I35.0 AORTIC STENOSIS, SEVERE: Primary | ICD-10-CM

## 2021-08-30 DIAGNOSIS — R09.89 CAROTID BRUIT, UNSPECIFIED LATERALITY: ICD-10-CM

## 2021-08-30 NOTE — PROGRESS NOTES
TAVR BAL    Patient in for Cardiac cath on Friday 8/27/21 per Dr. Burgos.  Referred for TAVR.  CT Surgery consult per Dr. Tripp in [facility].  See orders below for remainder of pre-op TAVR studies:  CTA TAVR protocol, TERRI, and carotid duplex.  Once scheduled, will notify patient of testing instructions.  Called patient today and sent out packet of educational materials and program contact information.      Gabriella SELBY

## 2021-08-31 ENCOUNTER — TRANSCRIBE ORDERS (OUTPATIENT)
Dept: ADMINISTRATIVE | Facility: HOSPITAL | Age: 66
End: 2021-08-31

## 2021-08-31 DIAGNOSIS — Z01.818 OTHER SPECIFIED PRE-OPERATIVE EXAMINATION: Primary | ICD-10-CM

## 2021-09-03 ENCOUNTER — TELEPHONE (OUTPATIENT)
Dept: CARDIOLOGY | Facility: HOSPITAL | Age: 66
End: 2021-09-03

## 2021-09-03 NOTE — TELEPHONE ENCOUNTER
TAVR BAL    Spoke with patient re: remaining TAVR pre-op testing.  He understood info for COVID 19 screen and carotid on 9/7 then TERRI on 9/9.  Advised NPO after 6 am for TERRI.  Check in [address] third floor @ 1 PM.  Bring .  Test will begin approximately 2 PM.  CTA is 9/28.  Mailing out typed letter with pertinent information.        Gabriella SELBY

## 2021-09-07 ENCOUNTER — HOSPITAL ENCOUNTER (OUTPATIENT)
Dept: CARDIOLOGY | Facility: HOSPITAL | Age: 66
Discharge: HOME | End: 2021-09-07

## 2021-09-07 ENCOUNTER — LAB (OUTPATIENT)
Dept: LAB | Facility: HOSPITAL | Age: 66
End: 2021-09-07

## 2021-09-07 DIAGNOSIS — Z01.818 OTHER SPECIFIED PRE-OPERATIVE EXAMINATION: ICD-10-CM

## 2021-09-07 DIAGNOSIS — R09.89 CAROTID BRUIT, UNSPECIFIED LATERALITY: ICD-10-CM

## 2021-09-07 LAB — SARS-COV-2 RNA PNL SPEC NAA+PROBE: NOT DETECTED

## 2021-09-07 PROCEDURE — 93880 EXTRACRANIAL BILAT STUDY: CPT

## 2021-09-07 PROCEDURE — 93880 EXTRACRANIAL BILAT STUDY: CPT | Performed by: RADIOLOGY

## 2021-09-07 PROCEDURE — U0004 COV-19 TEST NON-CDC HGH THRU: HCPCS | Performed by: INTERNAL MEDICINE

## 2021-09-07 PROCEDURE — C9803 HOPD COVID-19 SPEC COLLECT: HCPCS

## 2021-09-09 ENCOUNTER — HOSPITAL ENCOUNTER (OUTPATIENT)
Dept: CARDIOLOGY | Facility: HOSPITAL | Age: 66
Discharge: HOME | End: 2021-09-09
Admitting: NURSE PRACTITIONER

## 2021-09-09 VITALS
WEIGHT: 245 LBS | RESPIRATION RATE: 12 BRPM | HEART RATE: 68 BPM | SYSTOLIC BLOOD PRESSURE: 142 MMHG | OXYGEN SATURATION: 95 % | DIASTOLIC BLOOD PRESSURE: 87 MMHG | BODY MASS INDEX: 30.46 KG/M2 | HEIGHT: 75 IN

## 2021-09-09 DIAGNOSIS — I35.0 AORTIC STENOSIS, SEVERE: ICD-10-CM

## 2021-09-09 DIAGNOSIS — R06.09 EXERTIONAL DYSPNEA: ICD-10-CM

## 2021-09-09 DIAGNOSIS — R07.2 PRECORDIAL PAIN: ICD-10-CM

## 2021-09-09 PROCEDURE — 93321 DOPPLER ECHO F-UP/LMTD STD: CPT

## 2021-09-09 PROCEDURE — 93312 ECHO TRANSESOPHAGEAL: CPT

## 2021-09-09 PROCEDURE — 25010000002 MIDAZOLAM PER 1 MG: Performed by: INTERNAL MEDICINE

## 2021-09-09 PROCEDURE — 93325 DOPPLER ECHO COLOR FLOW MAPG: CPT

## 2021-09-09 RX ORDER — MIDAZOLAM HYDROCHLORIDE 1 MG/ML
INJECTION INTRAMUSCULAR; INTRAVENOUS
Status: COMPLETED | OUTPATIENT
Start: 2021-09-09 | End: 2021-09-09

## 2021-09-09 RX ADMIN — METHOHEXITAL SODIUM 20 MG: 500 INJECTION, POWDER, LYOPHILIZED, FOR SOLUTION INTRAMUSCULAR; INTRAVENOUS; RECTAL at 14:46

## 2021-09-09 RX ADMIN — MIDAZOLAM HYDROCHLORIDE 2 MG: 1 INJECTION, SOLUTION INTRAMUSCULAR; INTRAVENOUS at 14:43

## 2021-09-09 RX ADMIN — METHOHEXITAL SODIUM 20 MG: 500 INJECTION, POWDER, LYOPHILIZED, FOR SOLUTION INTRAMUSCULAR; INTRAVENOUS; RECTAL at 14:43

## 2021-09-09 NOTE — INTERVAL H&P NOTE
H&P updated. The patient was examined and the following changes are noted:        Notably, the patient states that ever since his left heart catheterization he has been experiencing more frequent and pervasive shortness of breath.  The patient reports that the shortness of breath occurs intermittently with exertion but is predominantly when he is resting/lying down.  He reports that this shortness of breath is also associated with chest pressure/pain.     Patient denies any episodes of PND, orthopnea, and no new/worsening LE edema.  He is CPAP adherent for his DANNY.

## 2021-09-10 ENCOUNTER — APPOINTMENT (OUTPATIENT)
Dept: GENERAL RADIOLOGY | Facility: HOSPITAL | Age: 66
End: 2021-09-10

## 2021-09-10 ENCOUNTER — HOSPITAL ENCOUNTER (EMERGENCY)
Facility: HOSPITAL | Age: 66
Discharge: HOME | End: 2021-09-10
Attending: EMERGENCY MEDICINE | Admitting: EMERGENCY MEDICINE

## 2021-09-10 ENCOUNTER — NURSE TRIAGE (OUTPATIENT)
Dept: CALL CENTER | Facility: HOSPITAL | Age: 66
End: 2021-09-10

## 2021-09-10 VITALS
TEMPERATURE: 98.7 F | HEART RATE: 81 BPM | BODY MASS INDEX: 31.08 KG/M2 | HEIGHT: 75 IN | SYSTOLIC BLOOD PRESSURE: 134 MMHG | OXYGEN SATURATION: 98 % | WEIGHT: 250 LBS | RESPIRATION RATE: 18 BRPM | DIASTOLIC BLOOD PRESSURE: 86 MMHG

## 2021-09-10 DIAGNOSIS — K22.2 ESOPHAGEAL STRICTURE: Primary | ICD-10-CM

## 2021-09-10 DIAGNOSIS — R07.9 CHEST PAIN, UNSPECIFIED TYPE: ICD-10-CM

## 2021-09-10 LAB
ALBUMIN SERPL-MCNC: 4.4 G/DL (ref 3.5–5.2)
ALBUMIN/GLOB SERPL: 1.4 G/DL
ALP SERPL-CCNC: 48 U/L (ref 39–117)
ALT SERPL W P-5'-P-CCNC: 26 U/L (ref 1–41)
ANION GAP SERPL CALCULATED.3IONS-SCNC: 12 MMOL/L (ref 5–15)
AST SERPL-CCNC: 18 U/L (ref 1–40)
BACTERIA UR QL AUTO: ABNORMAL /HPF
BASOPHILS # BLD AUTO: 0.02 10*3/MM3 (ref 0–0.2)
BASOPHILS NFR BLD AUTO: 0.1 % (ref 0–1.5)
BH CV ECHO MEAS - BSA(HAYCOCK): 2.4 M^2
BH CV ECHO MEAS - BSA: 2.4 M^2
BH CV ECHO MEAS - BZI_BMI: 30.6 KILOGRAMS/M^2
BH CV ECHO MEAS - BZI_METRIC_HEIGHT: 190.5 CM
BH CV ECHO MEAS - BZI_METRIC_WEIGHT: 111.1 KG
BH CV VAS BP LEFT ARM: NORMAL MMHG
BILIRUB SERPL-MCNC: 1.2 MG/DL (ref 0–1.2)
BILIRUB UR QL STRIP: NEGATIVE
BUN SERPL-MCNC: 18 MG/DL (ref 8–23)
BUN/CREAT SERPL: 17.6 (ref 7–25)
CALCIUM SPEC-SCNC: 9.4 MG/DL (ref 8.6–10.5)
CHLORIDE SERPL-SCNC: 102 MMOL/L (ref 98–107)
CLARITY UR: ABNORMAL
CO2 SERPL-SCNC: 25 MMOL/L (ref 22–29)
COLOR UR: ABNORMAL
CREAT SERPL-MCNC: 1.02 MG/DL (ref 0.76–1.27)
DEPRECATED RDW RBC AUTO: 45.6 FL (ref 37–54)
EOSINOPHIL # BLD AUTO: 0.01 10*3/MM3 (ref 0–0.4)
EOSINOPHIL NFR BLD AUTO: 0.1 % (ref 0.3–6.2)
ERYTHROCYTE [DISTWIDTH] IN BLOOD BY AUTOMATED COUNT: 13.2 % (ref 12.3–15.4)
FLUAV RNA RESP QL NAA+PROBE: NOT DETECTED
FLUBV RNA RESP QL NAA+PROBE: NOT DETECTED
GFR SERPL CREATININE-BSD FRML MDRD: 73 ML/MIN/1.73
GLOBULIN UR ELPH-MCNC: 3.1 GM/DL
GLUCOSE SERPL-MCNC: 121 MG/DL (ref 65–99)
GLUCOSE UR STRIP-MCNC: NEGATIVE MG/DL
HCT VFR BLD AUTO: 50 % (ref 37.5–51)
HGB BLD-MCNC: 16.8 G/DL (ref 13–17.7)
HGB UR QL STRIP.AUTO: NEGATIVE
HOLD SPECIMEN: NORMAL
HYALINE CASTS UR QL AUTO: ABNORMAL /LPF
IMM GRANULOCYTES # BLD AUTO: 0.11 10*3/MM3 (ref 0–0.05)
IMM GRANULOCYTES NFR BLD AUTO: 0.7 % (ref 0–0.5)
KETONES UR QL STRIP: ABNORMAL
LEUKOCYTE ESTERASE UR QL STRIP.AUTO: NEGATIVE
LIPASE SERPL-CCNC: 17 U/L (ref 13–60)
LYMPHOCYTES # BLD AUTO: 1.98 10*3/MM3 (ref 0.7–3.1)
LYMPHOCYTES NFR BLD AUTO: 13 % (ref 19.6–45.3)
MAXIMAL PREDICTED HEART RATE: 153 BPM
MCH RBC QN AUTO: 31.7 PG (ref 26.6–33)
MCHC RBC AUTO-ENTMCNC: 33.6 G/DL (ref 31.5–35.7)
MCV RBC AUTO: 94.3 FL (ref 79–97)
MONOCYTES # BLD AUTO: 1.17 10*3/MM3 (ref 0.1–0.9)
MONOCYTES NFR BLD AUTO: 7.7 % (ref 5–12)
NEUTROPHILS NFR BLD AUTO: 11.91 10*3/MM3 (ref 1.7–7)
NEUTROPHILS NFR BLD AUTO: 78.4 % (ref 42.7–76)
NITRITE UR QL STRIP: NEGATIVE
NRBC BLD AUTO-RTO: 0 /100 WBC (ref 0–0.2)
NT-PROBNP SERPL-MCNC: 128 PG/ML (ref 0–900)
PH UR STRIP.AUTO: 6 [PH] (ref 5–8)
PLATELET # BLD AUTO: 185 10*3/MM3 (ref 140–450)
PMV BLD AUTO: 10.5 FL (ref 6–12)
POTASSIUM SERPL-SCNC: 4 MMOL/L (ref 3.5–5.2)
PROT SERPL-MCNC: 7.5 G/DL (ref 6–8.5)
PROT UR QL STRIP: ABNORMAL
RBC # BLD AUTO: 5.3 10*6/MM3 (ref 4.14–5.8)
RBC # UR: ABNORMAL /HPF
REF LAB TEST METHOD: ABNORMAL
SARS-COV-2 RNA RESP QL NAA+PROBE: NOT DETECTED
SODIUM SERPL-SCNC: 139 MMOL/L (ref 136–145)
SP GR UR STRIP: >=1.03 (ref 1–1.03)
SQUAMOUS #/AREA URNS HPF: ABNORMAL /HPF
STRESS TARGET HR: 130 BPM
TROPONIN T SERPL-MCNC: <0.01 NG/ML (ref 0–0.03)
TROPONIN T SERPL-MCNC: <0.01 NG/ML (ref 0–0.03)
UROBILINOGEN UR QL STRIP: ABNORMAL
WBC # BLD AUTO: 15.2 10*3/MM3 (ref 3.4–10.8)
WBC UR QL AUTO: ABNORMAL /HPF
WHOLE BLOOD HOLD SPECIMEN: NORMAL
WHOLE BLOOD HOLD SPECIMEN: NORMAL

## 2021-09-10 PROCEDURE — 81001 URINALYSIS AUTO W/SCOPE: CPT | Performed by: EMERGENCY MEDICINE

## 2021-09-10 PROCEDURE — 96374 THER/PROPH/DIAG INJ IV PUSH: CPT

## 2021-09-10 PROCEDURE — 71045 X-RAY EXAM CHEST 1 VIEW: CPT

## 2021-09-10 PROCEDURE — 83690 ASSAY OF LIPASE: CPT | Performed by: EMERGENCY MEDICINE

## 2021-09-10 PROCEDURE — 84484 ASSAY OF TROPONIN QUANT: CPT | Performed by: EMERGENCY MEDICINE

## 2021-09-10 PROCEDURE — 63710000001 PREDNISOLONE 15 MG/5ML SOLUTION: Performed by: EMERGENCY MEDICINE

## 2021-09-10 PROCEDURE — 25010000002 MORPHINE PER 10 MG: Performed by: EMERGENCY MEDICINE

## 2021-09-10 PROCEDURE — 93005 ELECTROCARDIOGRAM TRACING: CPT | Performed by: EMERGENCY MEDICINE

## 2021-09-10 PROCEDURE — 85025 COMPLETE CBC W/AUTO DIFF WBC: CPT | Performed by: EMERGENCY MEDICINE

## 2021-09-10 PROCEDURE — 99283 EMERGENCY DEPT VISIT LOW MDM: CPT

## 2021-09-10 PROCEDURE — 87636 SARSCOV2 & INF A&B AMP PRB: CPT | Performed by: EMERGENCY MEDICINE

## 2021-09-10 PROCEDURE — 80053 COMPREHEN METABOLIC PANEL: CPT | Performed by: EMERGENCY MEDICINE

## 2021-09-10 PROCEDURE — 83880 ASSAY OF NATRIURETIC PEPTIDE: CPT | Performed by: EMERGENCY MEDICINE

## 2021-09-10 RX ORDER — PREDNISOLONE SODIUM PHOSPHATE 15 MG/5ML
15 SOLUTION ORAL 2 TIMES DAILY
Qty: 50 ML | Refills: 0 | Status: SHIPPED | OUTPATIENT
Start: 2021-09-10 | End: 2021-09-15

## 2021-09-10 RX ORDER — MORPHINE SULFATE 4 MG/ML
4 INJECTION, SOLUTION INTRAMUSCULAR; INTRAVENOUS ONCE
Status: COMPLETED | OUTPATIENT
Start: 2021-09-10 | End: 2021-09-10

## 2021-09-10 RX ORDER — PREDNISOLONE 15 MG/5ML
15 SOLUTION ORAL ONCE
Status: COMPLETED | OUTPATIENT
Start: 2021-09-10 | End: 2021-09-10

## 2021-09-10 RX ORDER — SODIUM CHLORIDE 0.9 % (FLUSH) 0.9 %
10 SYRINGE (ML) INJECTION AS NEEDED
Status: DISCONTINUED | OUTPATIENT
Start: 2021-09-10 | End: 2021-09-10 | Stop reason: HOSPADM

## 2021-09-10 RX ORDER — ASPIRIN 81 MG/1
324 TABLET, CHEWABLE ORAL ONCE
Status: DISCONTINUED | OUTPATIENT
Start: 2021-09-10 | End: 2021-09-10 | Stop reason: HOSPADM

## 2021-09-10 RX ADMIN — PREDNISOLONE 15 MG: 15 SOLUTION ORAL at 19:53

## 2021-09-10 RX ADMIN — SODIUM CHLORIDE 1000 ML: 9 INJECTION, SOLUTION INTRAVENOUS at 16:41

## 2021-09-10 RX ADMIN — LIDOCAINE HYDROCHLORIDE: 20 SOLUTION ORAL; TOPICAL at 16:32

## 2021-09-10 RX ADMIN — MORPHINE SULFATE 4 MG: 4 INJECTION, SOLUTION INTRAMUSCULAR; INTRAVENOUS at 19:53

## 2021-09-10 NOTE — ED PROVIDER NOTES
Subjective   66-year-old male presents for evaluation of chest pain.  The patient had an attempted TERRI earlier the day, but per the note they were unable to pass the probe beyond the upper esophagus due to suspected stricture.  It has been recommended to the patient that he receive an EGD.  He states since going home after his procedure he has had chest pain in the central upper portion of his chest.  He feels this is related to his esophagus.  He reports significant pain with swallowing, has been able to tolerate his secretions.  He denies any radiation of pain into his neck, shoulders, or jaw.  No reported abdominal pain.  He does however report having a fever over the last 1 day that has been between 101-102.  He denies cough or shortness of breath.  He denies change in bowel function.  No reported urinary symptoms include no dysuria, frequency, urgency.  He is awake and alert nontoxic and in no acute distress.  No sick contacts reported.  No other acute complaints at this time.          Review of Systems   Constitutional: Positive for fatigue and fever. Negative for chills.   HENT: Negative for congestion, ear pain, postnasal drip, sinus pressure and sore throat.    Eyes: Negative for pain, redness and visual disturbance.   Respiratory: Negative for cough, chest tightness and shortness of breath.    Cardiovascular: Positive for chest pain. Negative for palpitations and leg swelling.   Gastrointestinal: Negative for abdominal pain, anal bleeding, blood in stool, diarrhea, nausea and vomiting.        Difficulty swallowing   Endocrine: Negative for polydipsia and polyuria.   Genitourinary: Negative for difficulty urinating, dysuria, frequency and urgency.   Musculoskeletal: Negative for arthralgias, back pain and neck pain.   Skin: Negative for pallor and rash.   Allergic/Immunologic: Negative for environmental allergies and immunocompromised state.   Neurological: Negative for dizziness, weakness and headaches.    Hematological: Negative for adenopathy.   Psychiatric/Behavioral: Negative for confusion, self-injury and suicidal ideas. The patient is not nervous/anxious.    All other systems reviewed and are negative.      Past Medical History:   Diagnosis Date   • Arthritis    • GERD (gastroesophageal reflux disease)    • Heart murmur        No Known Allergies    Past Surgical History:   Procedure Laterality Date   • CARDIAC CATHETERIZATION     • CARDIAC CATHETERIZATION Left 2021    Procedure: Right and Left Heart Cath--C-19 ORDER FAXED TO  [facility];  Surgeon: Power Burgos MD;  Location:  [facility];  Service: Cardiology;  Laterality: Left;  Week of    • HERNIA REPAIR         Family History   Problem Relation Age of Onset   • Heart disease Mother    • Heart attack Father        Social History     Socioeconomic History   • Marital status:      Spouse name: Not on file   • Number of children: Not on file   • Years of education: Not on file   • Highest education level: Not on file   Tobacco Use   • Smoking status: Former Smoker     Types: Cigarettes     Quit date:      Years since quittin.7   • Smokeless tobacco: Current User     Types: Chew   Vaping Use   • Vaping Use: Never used   Substance and Sexual Activity   • Alcohol use: Yes     Alcohol/week: 2.0 standard drinks     Types: 2 Cans of beer per week     Comment: daily   • Drug use: Not Currently   • Sexual activity: Defer           Objective   Physical Exam  Vitals and nursing note reviewed.   Constitutional:       General: He is not in acute distress.     Appearance: Normal appearance. He is well-developed. He is not toxic-appearing or diaphoretic.   HENT:      Head: Normocephalic and atraumatic.      Right Ear: External ear normal.      Left Ear: External ear normal.      Nose: Nose normal.   Eyes:      General: Lids are normal.      Pupils: Pupils are equal, round, and reactive to light.   Neck:      Trachea: No tracheal  deviation.   Cardiovascular:      Rate and Rhythm: Normal rate and regular rhythm.      Pulses: No decreased pulses.      Heart sounds: Normal heart sounds. No murmur heard.   No friction rub. No gallop.    Pulmonary:      Effort: Pulmonary effort is normal. No respiratory distress.      Breath sounds: Normal breath sounds. No decreased breath sounds, wheezing, rhonchi or rales.   Abdominal:      General: Bowel sounds are normal.      Palpations: Abdomen is soft.      Tenderness: There is no abdominal tenderness. There is no guarding or rebound.   Musculoskeletal:         General: No deformity. Normal range of motion.      Cervical back: Normal range of motion and neck supple.   Lymphadenopathy:      Cervical: No cervical adenopathy.   Skin:     General: Skin is warm and dry.      Findings: No rash.   Neurological:      Mental Status: He is alert and oriented to person, place, and time.      Cranial Nerves: No cranial nerve deficit.      Sensory: No sensory deficit.   Psychiatric:         Speech: Speech normal.         Behavior: Behavior normal.         Thought Content: Thought content normal.         Judgment: Judgment normal.         Procedures           ED Course                                           MDM  Number of Diagnoses or Management Options  Chest pain, unspecified type: new and requires workup  Esophageal stricture: new and requires workup  Diagnosis management comments: HEART Score for Major Cardiac Events - MDCalc  Calculated on Sep 10 2021 7:44 PM  4 points -> Moderate Score (4-6 points) Risk of MACE of 12-16.6%.    Cardiac work-up shows no acute ischemic changes.  Chest x-ray is nonrevealing.  The patient had a cardiac catheterization performed less than 1 month ago that showed normal coronary vessels.  Patient has known significant aortic stenosis.    Patient has been swallowing secretions well throughout the ER course.    Patient was discharged with a short course of steroids and referral to GI  for further outpatient evaluation of esophageal stricture.    He is advised to observe for worsening of his fever, and inability to swallow.    Advised to return to the ER with any further concern.       Amount and/or Complexity of Data Reviewed  Clinical lab tests: ordered and reviewed  Tests in the radiology section of CPT®: ordered and reviewed  Obtain history from someone other than the patient: yes  Review and summarize past medical records: yes  Independent visualization of images, tracings, or specimens: yes        Final diagnoses:   Esophageal stricture   Chest pain, unspecified type       ED Disposition  ED Disposition     ED Disposition Condition Comment    Discharge Stable           Rivera Mcclure MD  [address] [facility] [address]  [place] [id number]  [phone]    Schedule an appointment as soon as possible for a visit       Geovanni Greene MD  [address] [place] [id number] [phone]    In 1 week           Medication List      New Prescriptions    prednisoLONE 15 MG/5ML solution  Commonly known as: ORAPRED  Take 5 mL by mouth 2 (two) times a day for 5 days.           Where to Get Your Medications      These medications were sent to [facility] - [place], [place] - [address] [address] [id number] - [phone]  - [phone]   [address] [address] [address]    Phone: [phone]   · prednisoLONE 15 MG/5ML solution          Nahum Charles MD  09/10/21 1940       Nahum Charles MD  09/10/21 1945

## 2021-09-10 NOTE — DISCHARGE INSTRUCTIONS
Follow-up with GI for further outpatient evaluation.    Stick to fluid diet only.    Return to the ER with any further concern.

## 2021-09-10 NOTE — TELEPHONE ENCOUNTER
"He had TERRI yesterday at [facility], they could not get test done, throat was to constricted, could not pass the scope, tried several times, since then, pt. Cannot swallow, except sips water and popsicles, told him e needs to be seen.     Reason for Disposition  • Sounds like a serious complication to the triager    Additional Information  • Negative: Sounds like a life-threatening emergency to the triager  • Negative: Chest pain  • Negative: Difficulty breathing  • Negative: Acting confused (e.g., disoriented, slurred speech) or excessively sleepy  • Negative: Surgical incision symptoms and questions  • Negative: [1] Discomfort (pain, burning or stinging) when passing urine AND [2] male  • Negative: [1] Discomfort (pain, burning or stinging) when passing urine AND [2] female  • Negative: Constipation  • Negative: New or worsening leg (calf, thigh) pain  • Negative: New or worsening leg swelling  • Negative: Dizziness is severe, or persists > 24 hours after surgery  • Negative: Pain, redness, swelling, or pus at IV Site  • Negative: Symptoms arising from use of a urinary catheter (Busch or Coude)  • Negative: Cast problems or questions  • Negative: Medication question  • Negative: [1] Widespread rash AND [2] bright red, sunburn-like  • Negative: [1] SEVERE headache AND [2] after spinal (epidural) anesthesia  • Negative: [1] Vomiting AND [2] persists > 4 hours  • Negative: [1] Vomiting AND [2] abdomen looks much more swollen than usual  • Negative: [1] Drinking very little AND [2] dehydration suspected (e.g., no urine > 12 hours, very dry mouth, very lightheaded)  • Negative: Patient sounds very sick or weak to the triager    Answer Assessment - Initial Assessment Questions  1. SYMPTOM: \"What's the main symptom you're concerned about?\" (e.g., pain, fever, vomiting)      Hard to swallow, sore end tight  2. ONSET: \"When did pain  start?\"      Yesterday post op throat sore  3. SURGERY: \"What surgery was performed?\"      " "TERRI  4. DATE of SURGERY: \"When was surgery performed?\"      09/09  5. ANESTHESIA: \" What type of anesthesia did you have?\" (e.g., general, spinal, epidural, local)      generl  6. PAIN: \"Is there any pain?\" If Yes, ask: \"How bad is it?\"  (Scale 1-10; or mild, moderate, severe)      Pain rated 10  7. FEVER: \"Do you have a fever?\" If Yes, ask: \"What is your temperature, how was it measured, and when did it start?\"      102.5 last night down today  8. VOMITING: \"Is there any vomiting?\" If yes, ask: \"How many times?\"      no  9. BLEEDING: \"Is there any bleeding?\" If Yes, ask: \"How much?\" and \"Where?\"      no  10. OTHER SYMPTOMS: \"Do you have any other symptoms?\" (e.g., drainage from wound, painful urination, constipation)        Sore throat and tight throat    Protocols used: POST-OP SYMPTOMS AND QUESTIONS-ADULT-AH      "

## 2021-09-11 LAB
QT INTERVAL: 374 MS
QTC INTERVAL: 441 MS

## 2021-09-18 LAB
QT INTERVAL: 370 MS
QTC INTERVAL: 442 MS

## 2021-09-28 ENCOUNTER — HOSPITAL ENCOUNTER (OUTPATIENT)
Dept: CT IMAGING | Facility: HOSPITAL | Age: 66
Discharge: HOME | End: 2021-09-28
Admitting: NURSE PRACTITIONER

## 2021-09-28 VITALS
DIASTOLIC BLOOD PRESSURE: 76 MMHG | HEART RATE: 56 BPM | SYSTOLIC BLOOD PRESSURE: 113 MMHG | RESPIRATION RATE: 20 BRPM | HEIGHT: 75 IN | BODY MASS INDEX: 30.59 KG/M2 | WEIGHT: 246 LBS

## 2021-09-28 DIAGNOSIS — I35.0 AORTIC STENOSIS, SEVERE: ICD-10-CM

## 2021-09-28 DIAGNOSIS — R06.09 EXERTIONAL DYSPNEA: ICD-10-CM

## 2021-09-28 DIAGNOSIS — R07.2 PRECORDIAL PAIN: ICD-10-CM

## 2021-09-28 PROCEDURE — 74174 CTA ABD&PLVS W/CONTRAST: CPT

## 2021-09-28 PROCEDURE — 0 IOPAMIDOL PER 1 ML: Performed by: NURSE PRACTITIONER

## 2021-09-28 PROCEDURE — 71275 CT ANGIOGRAPHY CHEST: CPT

## 2021-09-28 PROCEDURE — 99214 OFFICE O/P EST MOD 30 MIN: CPT | Performed by: NURSE PRACTITIONER

## 2021-09-28 PROCEDURE — 82565 ASSAY OF CREATININE: CPT

## 2021-09-28 RX ORDER — NITROGLYCERIN 0.4 MG/1
0.4 TABLET SUBLINGUAL
Status: DISCONTINUED | OUTPATIENT
Start: 2021-09-28 | End: 2021-09-29 | Stop reason: HOSPADM

## 2021-09-28 RX ORDER — METOPROLOL TARTRATE 50 MG/1
100 TABLET, FILM COATED ORAL ONCE AS NEEDED
Status: COMPLETED | OUTPATIENT
Start: 2021-09-28 | End: 2021-09-28

## 2021-09-28 RX ORDER — METOPROLOL TARTRATE 50 MG/1
50 TABLET, FILM COATED ORAL ONCE AS NEEDED
Status: COMPLETED | OUTPATIENT
Start: 2021-09-28 | End: 2021-09-28

## 2021-09-28 RX ORDER — LIDOCAINE HYDROCHLORIDE 10 MG/ML
5 INJECTION, SOLUTION EPIDURAL; INFILTRATION; INTRACAUDAL; PERINEURAL AS NEEDED
Status: DISCONTINUED | OUTPATIENT
Start: 2021-09-28 | End: 2021-09-29 | Stop reason: HOSPADM

## 2021-09-28 RX ORDER — SODIUM CHLORIDE 0.9 % (FLUSH) 0.9 %
10 SYRINGE (ML) INJECTION AS NEEDED
Status: DISCONTINUED | OUTPATIENT
Start: 2021-09-28 | End: 2021-09-29 | Stop reason: HOSPADM

## 2021-09-28 RX ORDER — SODIUM CHLORIDE 0.9 % (FLUSH) 0.9 %
3 SYRINGE (ML) INJECTION EVERY 12 HOURS SCHEDULED
Status: DISCONTINUED | OUTPATIENT
Start: 2021-09-28 | End: 2021-09-29 | Stop reason: HOSPADM

## 2021-09-28 RX ADMIN — IOPAMIDOL 100 ML: 755 INJECTION, SOLUTION INTRAVENOUS at 12:50

## 2021-09-28 RX ADMIN — METOPROLOL TARTRATE 50 MG: 50 TABLET, FILM COATED ORAL at 11:33

## 2021-09-28 NOTE — PROGRESS NOTES
"TAVR APRN Evaluation    Ron Villa, 1955, [id number]     09/28/21    PCP: Geovanni Greene MD  Primary Cardiologist: Power Burgos MD    TAVR Team:  1.  Prieto Tripp MD  2.  Lucio Carvajal MD  3.  Dorie White MD  4.  Melvin Rodgers MD  5.  Power Childress MD  6.  Morris Flanagan MD    Chief Complaint: Aortic stenosis      Identification: This is a 66 y.o. year old male from [address] [place] [address] [address] [place] [address].    History of Present Illness: Mr. Villa was referred for consideration of TAVR due to worsening exertional dyspnea over the past year.  PCP recommended cardiology visit and update echo as it had been many years since echo reviewed.  Severe AS noted and he was referred to Cardiology/ CT surgery for treatment plan.  He wishes to pursue TAVR rather than SAVR.  He has met with Dr. Tripp and Dr. Rodgers.  Patient is being seen today in [facility] prior to [facility]A TAVR protocol.  Today's test completes his pre-op testing for TAVR.     His aortic valve indices are as follows:  DENNY 0.76 cm2, Aortic valve Vmax 4.08 m/sec, and AV mean gradient 34 mm Hg.      PROBLEM LIST:   1. Aortic stenosis  a. \"Heart murmur\" x 20 years   b. Symptoms of worsening chest pain and shortness of breath summer 2021  c. Stress MPS 7/12/21: normal LVEF, small-sized mild degree of ischemia located in inferior wall; \"low-risk study\"  d. Echo 07/12/21: EF 66-70%, moderate calcification of the aortic valve, severe AS (peak velocity 408cm/sec, mean gradient 34mmHg, peak gradient 67 mmHg), mild MR   e. Cardiac cath 8/27/21: essentially normal coronary arteries  f. TERRI attempted 9/7/21: unable to pass probe.  Esophageal stricture suspected  2. Osteoarthritis   3. DANNY on CPAP   4. Diastolic heart failure   A.  R/t valvular heart disease  B.  NYHA class III CHACKO      Patient Active Problem List   Diagnosis   • Aortic stenosis, severe   • Chest pain   • Exertional dyspnea   • GERD (gastroesophageal reflux disease)   • Arthritis       Past " Surgical History:  Past Surgical History:   Procedure Laterality Date   • CARDIAC CATHETERIZATION     • CARDIAC CATHETERIZATION Left 2021    Procedure: Right and Left Heart Cath--C-19 ORDER FAXED TO [facility];  Surgeon: Power Burgos MD;  Location:  [facility];  Service: Cardiology;  Laterality: Left;  Week of    • HERNIA REPAIR         Allergies:  Patient has no known allergies.    Social History:  Social History     Socioeconomic History   • Marital status:      Spouse name: Livia   • Number of children: Not on file   • Years of education: HS   • Highest education level: Not on file   Tobacco Use   • Smoking status: Former Smoker     Types: Cigarettes     Quit date:      Years since quittin.7   • Smokeless tobacco: Current User     Types: Chew   Vaping Use   • Vaping Use: Never used   Substance and Sexual Activity   • Alcohol use: Yes     Alcohol/week: 2.0 standard drinks     Types: 2 Cans of beer per week     Comment: daily   • Drug use: Not Currently   • Sexual activity: Defer       Home Medications:  Medication Sig Start Date End Date Taking? Authorizing Provider   aspirin 81 MG EC tablet Take 81 mg by mouth Daily.    Yajaira Cole MD   cholecalciferol (VITAMIN D3) 25 MCG (1000 UT) tablet Take 1,000 Units by mouth Daily.    Yajaira Cole MD   fexofenadine (ALLEGRA) 180 MG tablet Take 180 mg by mouth Daily. 21   Yajaira Cole MD   meloxicam (MOBIC) 7.5 MG tablet Take 7.5 mg by mouth 2 (Two) Times a Day. 21   Yajaira Cole MD   Omega-3 Fatty Acids (fish oil) 1000 MG capsule capsule Take 1,000 mg by mouth Daily With Breakfast.    Yajaira Cole MD   vitamin B-12 (CYANOCOBALAMIN) 1000 MCG tablet Take 1,000 mcg by mouth Daily.    Yajaira Cole MD   vitamin C (ASCORBIC ACID) 250 MG tablet Take 250 mg by mouth Daily.    Yajaira Cole MD       Review of Systems:  Review of Systems   Constitutional: Positive  "for malaise/fatigue.   HENT: Negative.    Eyes: Negative.    Cardiovascular: Positive for dyspnea on exertion. Negative for chest pain, claudication and cyanosis.   Respiratory: Negative for shortness of breath and sleep disturbances due to breathing.    Endocrine: Negative.    Hematologic/Lymphatic: Negative.    Skin: Negative.    Musculoskeletal: Positive for arthritis.   Gastrointestinal: Negative.    Genitourinary: Negative.    Neurological: Positive for light-headedness.   Psychiatric/Behavioral: Negative.    Allergic/Immunologic: Negative.         Physical Exam:  Constitutional:       Appearance: Healthy appearance. Not in distress.   Eyes:      Conjunctiva/sclera: Conjunctivae normal.      Pupils: Pupils are equal, round, and reactive to light.   Neck:      Thyroid: No thyromegaly.      Lymphadenopathy: No cervical adenopathy.   Pulmonary:      Effort: Pulmonary effort is normal.      Breath sounds: Normal breath sounds. No wheezing. No rhonchi. No rales.   Cardiovascular:      Normal rate. Regular rhythm.      Murmurs: There is a grade 3/6 harsh, scratchy midsystolic murmur at the URSB, radiating to the neck.      No gallop. No rub.   Pulses:     Intact distal pulses.   Edema:     Peripheral edema absent.   Abdominal:      General: Bowel sounds are normal.      Palpations: Abdomen is soft.   Musculoskeletal: Normal range of motion.         General: No deformity.      Extremities: No clubbing present.     Cervical back: Normal range of motion and neck supple. Skin:     General: Skin is warm and dry.   Neurological:      Mental Status: Alert and oriented to person, place and time.         Vitals:    09/28/21 1110 09/28/21 1133 09/28/21 1201 09/28/21 1309   BP:  137/85 137/85 113/76   Pulse:  68 66 56   Resp:   18 20   Weight: 112 kg (246 lb)      Height: 190.5 cm (75\")          Diagnostic Data:  Transthoracic echo: 7/12/21    · Normal left ventricular cavity size noted. Left ventricular wall thickness is " consistent with concentric hypertrophy.  · Left ventricular ejection fraction appears to be 66 - 70%.  · Left ventricular diastolic function is consistent with (grade I) impaired relaxation.  · The aortic valve is abnormal in structure. A bicuspid aortic valve cannot be excluded. There is moderate calcification of the aortic valve. Mild aortic valve regurgitation is present. Moderate to severe aortic valve stenosis is present.  · Stenosis Ao max PG 66.6 mmHg Ao mean PG 34 mmHg Ao V2 VTI 93.2 cm DENNY(I,D) 0.76 cm^2 Regurgitation AI P1/2t 651.9 msec  · The mitral valve is structurally normal with no significant stenosis present. Mild mitral valve regurgitation is present.  · There is no evidence of pericardial effusion.  · Comments:May consider a transesophageal echocardiographic study to have a better look at the aortic valve valve     Transesophageal echo: 9/9/2021    · The patient was sedated with intravenous Versed and Brevital.  · TERRI was attempted and was unsuccessful due to failure to pass the probe below the level of upper esophagus. Esophageal stricture was suspected.  · The patient reports a history of dysphagia, EGD is recommended before any additional attempts of TERRI.     Cardiac Cath: 8/27/21    ---------------------------------------------------------------------------------------------------------------------     Referring Provider: Geovanni Greene M.D.     Procedures Performed: Right heart catheterization with cardiac output determination utilizing the thermodilution technique.  Bilateral selective coronary angiography.  Closure of the right common femoral vein with an 8 Kazakh Angio-Seal device and the right common femoral artery with a 6 Kazakh Angio-Seal device     Indications: The patient is a 66-year-old man with angina and noninvasive (transthoracic echocardiogram) evidence of severe valvular aortic stenosis.  Study with intervention standby is noted.   Complications:  There were no signs of early  "complications.     Hemodynamic Findings:  · AO pressure: 130/60 mmHg   · RA pressure, mean: 7 mmHg  · RV pressure: 36/7 mmHg  · PA pressure: 36/10, mean = 20 mmHg  · PCWP pressure, mean = 13 mmHg  · CO (TD) = 6.8 L/min  · CI = 2.8 L/min/m²  · HR = 68 bpm (NSR)        Angiographic Findings:  Left Ventriculography: Deferred.  Left ventricular systolic function is \"normal\" based on transthoracic echocardiography.  The aortic valve is severely calcified.        Selective Coronary Angiography:  · LM: Normal.  · LAD: The vessel gives rise to a moderate sized first diagonal branch proximally.  The proximal LAD is ectatic.  Obstructive disease is not seen.  · LCX: This is a very large nondominant vessel gives rise to 3 large marginal arteries of the posterior left ventricular wall.  Trivial proximal ectasia is noted.  Obstructive disease not seen.  · RCA: This is a large dominant vessel it gives rise to posterior descending and posterolateral branches.  This vessel is angiographically normal.     Final Impression: #1: Normal left ventricular systolic function (noninvasive data).  Forward cardiac output and index are normal.  Right heart pressures are normal.                             #2:  Essentially normal coronary arteries.                             #3:  This patient has symptomatic severe (by noninvasive/invasive evaluation) valvular aortic stenosis.     CTA Chest, Abdomen, and Pelvis: 9/30/21    EXAMINATION: CT ANGIO TAVR CHEST, ABDOMEN AND PELVIS-09/28/2021  FINDINGS:    NONVASCULAR: The thyroid is homogeneous in attenuation. No bulky  mediastinal adenopathy. Central airways are patent. Esophagus in normal  course to the distal segment where there is a small hiatal hernia.  Extended lung windows reveal subsegmental dependent atelectasis as well  as biapical pleural parenchymal scarring with calcified granulomata from  prior healed granulomatous involvement as well as peribronchial wall  thickening likely bronchitis " involvement or peribronchial inflammatory  process without dominant nodule, mass or consolidation. No pleural  effusion. Degenerative changes of the thoracic spine without aggressive  osseous lesion of the chest. No chest wall soft tissue findings  specifically, no bulky axillary adenopathy. The liver has  low-attenuation focus right hepatic lobe simple fluid signal of cyst  along with right hepatic inferior subcentimeter areas to small to  characterize but likely small cysts or hemangiomas. Gallbladder  unremarkable. No biliary dilatation. Pancreas and spleen unremarkable.  Adrenals without distinct nodule. Kidneys without hydronephrosis or  hydroureter. No bulky retroperitoneal adenopathy. GI tract evaluation  without focal thickening or disproportionate dilatation of bowel.  Appendix is unremarkable. No free fluid or intra-abdominal free air.  Sigmoid diverticulosis without evidence for acute diverticulitis. Pelvic  viscera demonstrates mild-moderately enlarged prostate with  calcifications, however, no bulky pelvic adenopathy or free fluid.  Degenerative changes of the spine and pelvis without aggressive osseous  lesion. No soft tissue body wall findings of acuity.     VASCULAR: Cardiac size borderline enlarged without pericardial effusion.  Trileaflet aortic valve with valvular calcifications and maximum  transaxial diameter of the supravalvular ascending thoracic aorta 3.7  cm. No evidence for dissection or periaortic inflammation with patent  great vessel origins and normal 3-vessel arch. Nonaneurysmal, patent  descending thoracic aorta and nonaneurysmal minimally atherosclerotic  abdominal aorta with minimal peripheral calcific disease, however,  patent celiac SMA renal artery and DANIEL origins without calcific disease  of the external iliac or common femoral vessels.     IMPRESSION:  1.Trileaflet aortic valve with valvular calcifications and maximum  transaxial diameter of the supravalvular ascending thoracic  aorta 3.7  cm.     2. No calcific disease of the external iliac or common femoral vessels.     3. Nonvascular findings reveals hepatic cysts and likely peribronchial  inflammatory findings within the chest, otherwise no acute process.     D:  09/28/2021     Carotid Doppler: 9/7/2021    IMPRESSION:  No hemodynamically significant plaques or stenoses were  demonstrated in the carotids. Vertebral artery flows are antegrade.       Functional Assessment Data:    KCQ12 Questionnaire Score: (see scanned copy) : 31/70      [place] Basic Activities of Daily Living (ADL) Scale    Bathing (sponge bath, tub bath, or shower).  Receives either no assistance,   or assistance with bathing only on body part.   Yes    Dressing Gets clothes and dresses without any assistance, except for  tying shoes.        Yes    Toileting Goes to toilet room, uses toilet, arranges clothes, and returns  without any assistance (may use cane or walker for support and may use  bedpan / urinal at night.      Yes    Transferring Moves into and out of bed and chair without assistance  (may use cane or walker)      Yes    Continence Controls bowel and bladder completely without occasional  accidents        Yes    Feeding Feeds self without assistance (except for help with cutting meat  or buttering bread)       Yes        Total (Number of Yesses of 6) 6/6      [place] Instrumental Activities of Daily Living Scale (IADL)    Ability to Use Telephone   · Operates telephone on own initiative.  Looks up and dials numbers, etc.         1  Shopping  Takes care of all shopping needs indepently  1  Food Preparation  · Plans, prepares, and serves adequate meals independently          1  Housekeeping  · Maintains house alone or with occasional assistance,   e.g. heavy work domestic help    1  Laundry  Does personal laundry completely   1     Mode of Transportation  · Travels independently on public transportation or drives own car          1  Responsibility for Own  Medications  · Is responsible for taking medications in correct dosages at correct time          1  Ability to Handle Finances  · Manages financial matters independently (budgets, writes checks,   pays rent, bills, goes to bank), collects and keeps track of income          1      Total (Number of Instrumental Activities of 8) 8/8         Five Meter Walk Test    Utilized Walking Aid? No     Walk 1: 4.12 s/5m     Walk 2: 4.49 s/5m     Walk 3: 4.76 s/5m    Five Meter Walk Average: 4.45 s/5m    Gait Speed: Normal (Average < or = 6 s/5m)         STS risk of mortality with open AVR    [email or web address]/[email or web address]       Creatinine Clearance: 95 ml/min  [email or web address]-cockcroft-gault    Assessment/ Plan:     ICD-10-CM ICD-9-CM   1. Severe symptomatic aortic stenosis.  Low risk gentleman.  Finishing pre-op testing eval today.  Has met with Cardiology and CT Surgery.  Plans to pursue TAVR October I35.0 424.1   2. DHF.  NYHA class II-III symptoms R07.2 786.51   3. DANNY.  Compliant with CPAP R06.00 786.09   4. Osteoarthritis       TAVR education materials reviewed with patient/ spouse today.  This included printed brochure detailing pathophysiology of aortic stenosis, patient specific aortic stenosis symptoms, and treatment options (medical therapy, surgical aortic valve replacement, and transcatheter aortic valve replacement).       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  We reviewed the [facility] Cardiosmart Shared Decision Making Tool for treatment of Aortic Stenosis to again compare/contrast the options of TAVR/ SAVR/ medical management.  He states he is happy with his decision to pursue TAVR.    Patient has Living Will (N) and Power of  (N). Spouse is aware of his healthcare wishes.    Our talk also included procedural details, procedure risks, anticipated  pre-op and post-op expectations, as well as follow up visit schedule @ one month and one year.  Further discussion and final decision making will occur with Multi- Disciplinary Heart Team following the completion of pre-requisite testing.      BAL Rodriguez, 10/06/21, 12:35 EDT

## 2021-10-04 LAB — CREAT BLDA-MCNC: 1 MG/DL (ref 0.6–1.3)

## 2021-11-01 DIAGNOSIS — I35.0 AORTIC STENOSIS, SEVERE: Primary | ICD-10-CM

## 2021-11-02 ENCOUNTER — PREP FOR SURGERY (OUTPATIENT)
Dept: OTHER | Facility: HOSPITAL | Age: 66
End: 2021-11-02

## 2021-11-02 DIAGNOSIS — I35.9 AVD (AORTIC VALVE DISEASE): Primary | ICD-10-CM

## 2021-11-02 RX ORDER — NITROGLYCERIN 0.4 MG/1
0.4 TABLET SUBLINGUAL
Status: CANCELLED | OUTPATIENT
Start: 2021-11-08

## 2021-11-02 RX ORDER — CHLORHEXIDINE GLUCONATE 0.12 MG/ML
15 RINSE ORAL ONCE
Status: CANCELLED | OUTPATIENT
Start: 2021-11-08 | End: 2021-11-08

## 2021-11-02 RX ORDER — ASPIRIN 325 MG
325 TABLET ORAL NIGHTLY
Status: CANCELLED | OUTPATIENT
Start: 2021-11-05 | End: 2021-11-06

## 2021-11-02 RX ORDER — CHLORHEXIDINE GLUCONATE 500 MG/1
1 CLOTH TOPICAL EVERY 12 HOURS PRN
Status: CANCELLED | OUTPATIENT
Start: 2021-11-08

## 2021-11-02 RX ORDER — CHLORHEXIDINE GLUCONATE 500 MG/1
1 CLOTH TOPICAL EVERY 12 HOURS PRN
Status: CANCELLED | OUTPATIENT
Start: 2021-11-05

## 2021-11-02 RX ORDER — ACETAMINOPHEN 325 MG/1
650 TABLET ORAL EVERY 4 HOURS PRN
Status: CANCELLED | OUTPATIENT
Start: 2021-11-08

## 2021-11-05 ENCOUNTER — HOSPITAL ENCOUNTER (OUTPATIENT)
Dept: GENERAL RADIOLOGY | Facility: HOSPITAL | Age: 66
Discharge: HOME | End: 2021-11-05

## 2021-11-05 ENCOUNTER — ANESTHESIA EVENT (OUTPATIENT)
Dept: PERIOP | Facility: HOSPITAL | Age: 66
End: 2021-11-05

## 2021-11-05 ENCOUNTER — PRE-ADMISSION TESTING (OUTPATIENT)
Dept: PREADMISSION TESTING | Facility: HOSPITAL | Age: 66
End: 2021-11-05

## 2021-11-05 ENCOUNTER — ANCILLARY PROCEDURE (OUTPATIENT)
Dept: PERIOP | Facility: HOSPITAL | Age: 66
End: 2021-11-05

## 2021-11-05 ENCOUNTER — HOSPITAL ENCOUNTER (OUTPATIENT)
Dept: PULMONOLOGY | Facility: HOSPITAL | Age: 66
Discharge: HOME | End: 2021-11-05

## 2021-11-05 VITALS — HEIGHT: 75 IN | BODY MASS INDEX: 30.84 KG/M2 | OXYGEN SATURATION: 98 % | WEIGHT: 248.02 LBS

## 2021-11-05 DIAGNOSIS — I35.9 AVD (AORTIC VALVE DISEASE): ICD-10-CM

## 2021-11-05 LAB
ABO GROUP BLD: NORMAL
ALBUMIN SERPL-MCNC: 4.5 G/DL (ref 3.5–5.2)
ALBUMIN/GLOB SERPL: 1.9 G/DL
ALP SERPL-CCNC: 57 U/L (ref 39–117)
ALT SERPL W P-5'-P-CCNC: 42 U/L (ref 1–41)
AMPHET+METHAMPHET UR QL: NEGATIVE
AMPHETAMINES UR QL: NEGATIVE
ANION GAP SERPL CALCULATED.3IONS-SCNC: 10 MMOL/L (ref 5–15)
APTT PPP: 30.7 SECONDS (ref 22–39)
AST SERPL-CCNC: 31 U/L (ref 1–40)
BARBITURATES UR QL SCN: NEGATIVE
BASOPHILS # BLD AUTO: 0.03 10*3/MM3 (ref 0–0.2)
BASOPHILS NFR BLD AUTO: 0.6 % (ref 0–1.5)
BENZODIAZ UR QL SCN: NEGATIVE
BILIRUB SERPL-MCNC: 0.5 MG/DL (ref 0–1.2)
BLD GP AB SCN SERPL QL: NEGATIVE
BUN SERPL-MCNC: 15 MG/DL (ref 8–23)
BUN/CREAT SERPL: 15.5 (ref 7–25)
BUPRENORPHINE SERPL-MCNC: NEGATIVE NG/ML
CALCIUM SPEC-SCNC: 9.3 MG/DL (ref 8.6–10.5)
CANNABINOIDS SERPL QL: NEGATIVE
CHLORIDE SERPL-SCNC: 103 MMOL/L (ref 98–107)
CO2 SERPL-SCNC: 26 MMOL/L (ref 22–29)
COCAINE UR QL: NEGATIVE
CREAT SERPL-MCNC: 0.97 MG/DL (ref 0.76–1.27)
DEPRECATED RDW RBC AUTO: 45.7 FL (ref 37–54)
EOSINOPHIL # BLD AUTO: 0.19 10*3/MM3 (ref 0–0.4)
EOSINOPHIL NFR BLD AUTO: 3.9 % (ref 0.3–6.2)
ERYTHROCYTE [DISTWIDTH] IN BLOOD BY AUTOMATED COUNT: 13 % (ref 12.3–15.4)
GFR SERPL CREATININE-BSD FRML MDRD: 77 ML/MIN/1.73
GLOBULIN UR ELPH-MCNC: 2.4 GM/DL
GLUCOSE SERPL-MCNC: 99 MG/DL (ref 65–99)
HBA1C MFR BLD: 5.7 % (ref 4.8–5.6)
HCT VFR BLD AUTO: 48.5 % (ref 37.5–51)
HGB BLD-MCNC: 16.3 G/DL (ref 13–17.7)
IMM GRANULOCYTES # BLD AUTO: 0.02 10*3/MM3 (ref 0–0.05)
IMM GRANULOCYTES NFR BLD AUTO: 0.4 % (ref 0–0.5)
INR PPP: 1 (ref 0.85–1.16)
LYMPHOCYTES # BLD AUTO: 1.86 10*3/MM3 (ref 0.7–3.1)
LYMPHOCYTES NFR BLD AUTO: 38.1 % (ref 19.6–45.3)
MAGNESIUM SERPL-MCNC: 2.3 MG/DL (ref 1.6–2.4)
MCH RBC QN AUTO: 31.8 PG (ref 26.6–33)
MCHC RBC AUTO-ENTMCNC: 33.6 G/DL (ref 31.5–35.7)
MCV RBC AUTO: 94.7 FL (ref 79–97)
METHADONE UR QL SCN: NEGATIVE
MONOCYTES # BLD AUTO: 0.52 10*3/MM3 (ref 0.1–0.9)
MONOCYTES NFR BLD AUTO: 10.7 % (ref 5–12)
NEUTROPHILS NFR BLD AUTO: 2.26 10*3/MM3 (ref 1.7–7)
NEUTROPHILS NFR BLD AUTO: 46.3 % (ref 42.7–76)
NRBC BLD AUTO-RTO: 0 /100 WBC (ref 0–0.2)
OPIATES UR QL: NEGATIVE
OXYCODONE UR QL SCN: NEGATIVE
PA ADP PRP-ACNC: 165 PRU
PCP UR QL SCN: NEGATIVE
PLATELET # BLD AUTO: 175 10*3/MM3 (ref 140–450)
PMV BLD AUTO: 10.4 FL (ref 6–12)
POTASSIUM SERPL-SCNC: 4.9 MMOL/L (ref 3.5–5.2)
PROPOXYPH UR QL: NEGATIVE
PROT SERPL-MCNC: 6.9 G/DL (ref 6–8.5)
PROTHROMBIN TIME: 12.9 SECONDS (ref 11.4–14.4)
RBC # BLD AUTO: 5.12 10*6/MM3 (ref 4.14–5.8)
RH BLD: POSITIVE
SARS-COV-2 RNA RESP QL NAA+PROBE: NOT DETECTED
SODIUM SERPL-SCNC: 139 MMOL/L (ref 136–145)
T&S EXPIRATION DATE: NORMAL
TRICYCLICS UR QL SCN: NEGATIVE
WBC # BLD AUTO: 4.88 10*3/MM3 (ref 3.4–10.8)

## 2021-11-05 PROCEDURE — 85025 COMPLETE CBC W/AUTO DIFF WBC: CPT

## 2021-11-05 PROCEDURE — C9803 HOPD COVID-19 SPEC COLLECT: HCPCS

## 2021-11-05 PROCEDURE — 86850 RBC ANTIBODY SCREEN: CPT

## 2021-11-05 PROCEDURE — 85730 THROMBOPLASTIN TIME PARTIAL: CPT

## 2021-11-05 PROCEDURE — 85610 PROTHROMBIN TIME: CPT

## 2021-11-05 PROCEDURE — 80306 DRUG TEST PRSMV INSTRMNT: CPT

## 2021-11-05 PROCEDURE — 86901 BLOOD TYPING SEROLOGIC RH(D): CPT

## 2021-11-05 PROCEDURE — U0003 INFECTIOUS AGENT DETECTION BY NUCLEIC ACID (DNA OR RNA); SEVERE ACUTE RESPIRATORY SYNDROME CORONAVIRUS 2 (SARS-COV-2) (CORONAVIRUS DISEASE [COVID-19]), AMPLIFIED PROBE TECHNIQUE, MAKING USE OF HIGH THROUGHPUT TECHNOLOGIES AS DESCRIBED BY CMS-2020-01-R: HCPCS

## 2021-11-05 PROCEDURE — 86900 BLOOD TYPING SEROLOGIC ABO: CPT

## 2021-11-05 PROCEDURE — 94010 BREATHING CAPACITY TEST: CPT | Performed by: INTERNAL MEDICINE

## 2021-11-05 PROCEDURE — 86923 COMPATIBILITY TEST ELECTRIC: CPT

## 2021-11-05 PROCEDURE — 80053 COMPREHEN METABOLIC PANEL: CPT

## 2021-11-05 PROCEDURE — 85576 BLOOD PLATELET AGGREGATION: CPT

## 2021-11-05 PROCEDURE — 83036 HEMOGLOBIN GLYCOSYLATED A1C: CPT

## 2021-11-05 PROCEDURE — 36415 COLL VENOUS BLD VENIPUNCTURE: CPT

## 2021-11-05 PROCEDURE — 71046 X-RAY EXAM CHEST 2 VIEWS: CPT

## 2021-11-05 PROCEDURE — 94010 BREATHING CAPACITY TEST: CPT

## 2021-11-05 PROCEDURE — 83735 ASSAY OF MAGNESIUM: CPT

## 2021-11-05 RX ORDER — ASPIRIN 325 MG
325 TABLET ORAL NIGHTLY
Status: SHIPPED | OUTPATIENT
Start: 2021-11-05 | End: 2021-11-06

## 2021-11-05 RX ORDER — PANTOPRAZOLE SODIUM 40 MG/1
40 TABLET, DELAYED RELEASE ORAL DAILY
COMMUNITY

## 2021-11-05 RX ORDER — CHLORHEXIDINE GLUCONATE 500 MG/1
1 CLOTH TOPICAL EVERY 12 HOURS PRN
Status: DISCONTINUED | OUTPATIENT
Start: 2021-11-05 | End: 2021-11-15

## 2021-11-05 NOTE — PAT
Patient viewed general PAT education video as instructed in their preoperative information received from their surgeon.  Patient stated the general PAT education video was viewed in its entirety and survey completed.  Copies of [facility] general education handouts (Incentive Spirometry, Meds to Beds Program, Patient Belongings, Pre-op skin preparation instructions, Blood Glucose testing, Visitor policy, Surgery FAQ, Code H) distributed to patient if not printed. Education related to the PAT pass and skin preparation for surgery (if applicable) completed in [facility] as a reinforcement to PAT education video. Patient instructed to return PAT pass provided today as well as completed skin preparation sheet (if applicable) on the day of procedure.     Additionally if patient had not viewed video yet but intended to view it at home or in our waiting area, then referred them to the handout with QR code/link provided during PAT visit.  Instructed patient to complete survey after viewing the video in its entirety.  Encouraged patient/family to read [facility] general education handouts thoroughly and notify PAT staff with any questions or concerns. Patient verbalized understanding of all information and priority content.    Patient to apply Chlorhexadine wipes  to surgical area (as instructed) the night before procedure and the AM of procedure. Wipes provided.    Patient directed to Radiology Department for CXR after Pre Admission Testing Appointment.     NO ABG ORDERED. O2 SATS AT 98% ON ROOM AIR    Patient instructed to bring CPAP mask and tubing to the hospital for overnight stay.  Explained that it is not necessary to bring their CPAP machine to the hospital instead a CPAP machine will be provided for use by the hospital. If patient knows their CPAP settings, those settings will be implemented.  If not, the CPAP machine will be utilized on the auto setting using their mask and tubing.    Patient verbalized understanding.    EKG ON Epic  FROM 9/10/2021    Instructed patient to take 325 mg the night before heart surgery as per CT surgeon's order.  Patient and/or family verbalized understanding.    Blood bank bracelet applied to patient during Pre Admission Testing visit.  Patient instructed not to remove from arm until after procedure and they are discharged from the hospital.  Explained to patient that they may shower and get the bracelet wet, but not to immerse under water for longer periods (bathing, swimming, hand dishwashing, etc).  Patient verbalized understanding.    INSTRUCTIONS GIVEN ON MUPRICIN APPLICATION FOR NIGHT BEFORE SURGERY    PATIENT INSTRUCTED TO GO TO RESPIRATORY DEPT AFTER CHEST XRAY    PATIENT PRESENTED WITH INSTRUCTIONS TO ARRIVE FOR TERRI AT 11/05/2021. HE HAS NOT BEEN NPO. INSTRUCTED PATIENT TO CONTACT HIS SURGEON ASAP

## 2021-11-07 RX ORDER — FAMOTIDINE 10 MG/ML
20 INJECTION, SOLUTION INTRAVENOUS ONCE
Status: CANCELLED | OUTPATIENT
Start: 2021-11-07 | End: 2021-11-07

## 2021-11-07 NOTE — ANESTHESIA PREPROCEDURE EVALUATION
Anesthesia Evaluation     Patient summary reviewed and Nursing notes reviewed   no history of anesthetic complications:  NPO Solid Status: > 8 hours  NPO Liquid Status: > 8 hours           Airway   Mallampati: II  TM distance: >3 FB  Neck ROM: full  No difficulty expected  Dental - normal exam     Pulmonary - normal exam   (+) a smoker Former, shortness of breath, sleep apnea,   Cardiovascular   Exercise tolerance: poor (<4 METS)    ECG reviewed    (+) valvular problems/murmurs AS, angina, CHACKO, murmur,     ROS comment: TTE: 7/12/21   Normal left ventricular cavity size noted. Left ventricular wall thickness is consistent with concentric hypertrophy.  · Left ventricular ejection fraction appears to be 66 - 70%.  · Left ventricular diastolic function is consistent with (grade I) impaired relaxation.  · The aortic valve is abnormal in structure. A bicuspid aortic valve cannot be excluded. There is moderate calcification of the aortic valve. Mild aortic valve regurgitation is present. Moderate to severe aortic valve stenosis is present.  · Stenosis Ao max PG 66.6 mmHg Ao mean PG 34 mmHg Ao V2 VTI 93.2 cm DENNY(I,D) 0.76 cm^2 Regurgitation AI P1/2t 651.9 msec  · The mitral valve is structurally normal with no significant stenosis present. Mild mitral valve regurgitation is present.  · There is no evidence of pericardial effusion.  · Comments:May consider a transesophageal echocardiographic study to have a better look at the aortic valve valve    Cath: normal coronaries    Neuro/Psych- negative ROS  GI/Hepatic/Renal/Endo    (+)  GERD,    (-) liver disease, no renal disease, diabetes, no thyroid disorder    ROS Comment: H/o dysphagia    Musculoskeletal     Abdominal    Substance History      OB/GYN          Other   arthritis,    history of cancer                  Anesthesia Plan    ASA 4     general   (Stephanie +/-TERRI, post op ICU)  intravenous induction     Anesthetic plan, all risks, benefits, and alternatives have been  provided, discussed and informed consent has been obtained with: patient.    Plan discussed with CRNA.

## 2021-11-08 ENCOUNTER — ANCILLARY PROCEDURE (OUTPATIENT)
Dept: PERIOP | Facility: HOSPITAL | Age: 66
DRG: 267 | End: 2021-11-08
Payer: COMMERCIAL

## 2021-11-08 ENCOUNTER — HOSPITAL ENCOUNTER (INPATIENT)
Facility: HOSPITAL | Age: 66
LOS: 1 days | Discharge: HOME | DRG: 267 | End: 2021-11-09
Attending: THORACIC SURGERY (CARDIOTHORACIC VASCULAR SURGERY) | Admitting: THORACIC SURGERY (CARDIOTHORACIC VASCULAR SURGERY)
Payer: COMMERCIAL

## 2021-11-08 ENCOUNTER — APPOINTMENT (OUTPATIENT)
Dept: CARDIOLOGY | Facility: HOSPITAL | Age: 66
DRG: 267 | End: 2021-11-08
Payer: COMMERCIAL

## 2021-11-08 ENCOUNTER — ANESTHESIA (OUTPATIENT)
Dept: PERIOP | Facility: HOSPITAL | Age: 66
End: 2021-11-08

## 2021-11-08 ENCOUNTER — ANESTHESIA EVENT CONVERTED (OUTPATIENT)
Dept: ANESTHESIOLOGY | Facility: HOSPITAL | Age: 66
DRG: 267 | End: 2021-11-08
Payer: COMMERCIAL

## 2021-11-08 DIAGNOSIS — I35.0 AORTIC STENOSIS, SEVERE: Primary | ICD-10-CM

## 2021-11-08 DIAGNOSIS — I35.9 AVD (AORTIC VALVE DISEASE): ICD-10-CM

## 2021-11-08 DIAGNOSIS — I35.0 AORTIC STENOSIS, SEVERE: ICD-10-CM

## 2021-11-08 LAB
ABO GROUP BLD: NORMAL
ACT BLD: 131 SECONDS (ref 82–152)
ANION GAP SERPL CALCULATED.3IONS-SCNC: 10 MMOL/L (ref 5–15)
APTT PPP: 33.5 SECONDS (ref 22–39)
BH BB BLOOD EXPIRATION DATE: NORMAL
BH BB BLOOD EXPIRATION DATE: NORMAL
BH BB BLOOD TYPE BARCODE: 5100
BH BB BLOOD TYPE BARCODE: 5100
BH BB DISPENSE STATUS: NORMAL
BH BB DISPENSE STATUS: NORMAL
BH BB PRODUCT CODE: NORMAL
BH BB PRODUCT CODE: NORMAL
BH BB UNIT NUMBER: NORMAL
BH BB UNIT NUMBER: NORMAL
BUN SERPL-MCNC: 13 MG/DL (ref 8–23)
BUN/CREAT SERPL: 13.8 (ref 7–25)
CALCIUM SPEC-SCNC: 8.5 MG/DL (ref 8.6–10.5)
CHLORIDE SERPL-SCNC: 106 MMOL/L (ref 98–107)
CO2 SERPL-SCNC: 23 MMOL/L (ref 22–29)
CREAT SERPL-MCNC: 0.94 MG/DL (ref 0.76–1.27)
CROSSMATCH INTERPRETATION: NORMAL
CROSSMATCH INTERPRETATION: NORMAL
DEPRECATED RDW RBC AUTO: 44.9 FL (ref 37–54)
ERYTHROCYTE [DISTWIDTH] IN BLOOD BY AUTOMATED COUNT: 13 % (ref 12.3–15.4)
GFR SERPL CREATININE-BSD FRML MDRD: 80 ML/MIN/1.73
GLUCOSE BLDC GLUCOMTR-MCNC: 100 MG/DL (ref 70–130)
GLUCOSE BLDC GLUCOMTR-MCNC: 94 MG/DL (ref 70–130)
GLUCOSE SERPL-MCNC: 119 MG/DL (ref 65–99)
HCT VFR BLD AUTO: 45.7 % (ref 37.5–51)
HGB BLD-MCNC: 15.5 G/DL (ref 13–17.7)
MCH RBC QN AUTO: 31.7 PG (ref 26.6–33)
MCHC RBC AUTO-ENTMCNC: 33.9 G/DL (ref 31.5–35.7)
MCV RBC AUTO: 93.5 FL (ref 79–97)
PLATELET # BLD AUTO: 167 10*3/MM3 (ref 140–450)
PMV BLD AUTO: 10.4 FL (ref 6–12)
POTASSIUM SERPL-SCNC: 3.9 MMOL/L (ref 3.5–5.2)
QT INTERVAL: 404 MS
QTC INTERVAL: 480 MS
RBC # BLD AUTO: 4.89 10*6/MM3 (ref 4.14–5.8)
RH BLD: POSITIVE
SODIUM SERPL-SCNC: 139 MMOL/L (ref 136–145)
UNIT  ABO: NORMAL
UNIT  ABO: NORMAL
UNIT  RH: NORMAL
UNIT  RH: NORMAL
WBC # BLD AUTO: 9.5 10*3/MM3 (ref 3.4–10.8)

## 2021-11-08 PROCEDURE — 25010000002 DEXAMETHASONE PER 1 MG: Performed by: NURSE ANESTHETIST, CERTIFIED REGISTERED

## 2021-11-08 PROCEDURE — C1894 INTRO/SHEATH, NON-LASER: HCPCS | Performed by: THORACIC SURGERY (CARDIOTHORACIC VASCULAR SURGERY)

## 2021-11-08 PROCEDURE — 5A1223Z PERFORMANCE OF CARDIAC PACING, CONTINUOUS: ICD-10-PCS | Performed by: THORACIC SURGERY (CARDIOTHORACIC VASCULAR SURGERY)

## 2021-11-08 PROCEDURE — 82330 ASSAY OF CALCIUM: CPT

## 2021-11-08 PROCEDURE — S0260 H&P FOR SURGERY: HCPCS | Performed by: INTERNAL MEDICINE

## 2021-11-08 PROCEDURE — 86900 BLOOD TYPING SEROLOGIC ABO: CPT

## 2021-11-08 PROCEDURE — C1889 IMPLANT/INSERT DEVICE, NOC: HCPCS | Performed by: THORACIC SURGERY (CARDIOTHORACIC VASCULAR SURGERY)

## 2021-11-08 PROCEDURE — 25010000002 HEPARIN (PORCINE) PER 1000 UNITS: Performed by: NURSE ANESTHETIST, CERTIFIED REGISTERED

## 2021-11-08 PROCEDURE — C1760 CLOSURE DEV, VASC: HCPCS | Performed by: THORACIC SURGERY (CARDIOTHORACIC VASCULAR SURGERY)

## 2021-11-08 PROCEDURE — 85347 COAGULATION TIME ACTIVATED: CPT

## 2021-11-08 PROCEDURE — 85027 COMPLETE CBC AUTOMATED: CPT | Performed by: PHYSICIAN ASSISTANT

## 2021-11-08 PROCEDURE — 82803 BLOOD GASES ANY COMBINATION: CPT

## 2021-11-08 PROCEDURE — 85730 THROMBOPLASTIN TIME PARTIAL: CPT | Performed by: THORACIC SURGERY (CARDIOTHORACIC VASCULAR SURGERY)

## 2021-11-08 PROCEDURE — 85014 HEMATOCRIT: CPT

## 2021-11-08 PROCEDURE — C1769 GUIDE WIRE: HCPCS | Performed by: THORACIC SURGERY (CARDIOTHORACIC VASCULAR SURGERY)

## 2021-11-08 PROCEDURE — 86901 BLOOD TYPING SEROLOGIC RH(D): CPT

## 2021-11-08 PROCEDURE — 25010000002 PROTAMINE SULFATE PER 10 MG: Performed by: NURSE ANESTHETIST, CERTIFIED REGISTERED

## 2021-11-08 PROCEDURE — 80048 BASIC METABOLIC PNL TOTAL CA: CPT | Performed by: PHYSICIAN ASSISTANT

## 2021-11-08 PROCEDURE — 99222 1ST HOSP IP/OBS MODERATE 55: CPT | Performed by: INTERNAL MEDICINE

## 2021-11-08 PROCEDURE — 84295 ASSAY OF SERUM SODIUM: CPT

## 2021-11-08 PROCEDURE — 25010000002 FENTANYL CITRATE (PF) 50 MCG/ML SOLUTION: Performed by: NURSE ANESTHETIST, CERTIFIED REGISTERED

## 2021-11-08 PROCEDURE — 33361 REPLACE AORTIC VALVE PERQ: CPT | Performed by: INTERNAL MEDICINE

## 2021-11-08 PROCEDURE — 93355 ECHO TRANSESOPHAGEAL (TEE): CPT

## 2021-11-08 PROCEDURE — 02RF38Z REPLACEMENT OF AORTIC VALVE WITH ZOOPLASTIC TISSUE, PERCUTANEOUS APPROACH: ICD-10-PCS | Performed by: THORACIC SURGERY (CARDIOTHORACIC VASCULAR SURGERY)

## 2021-11-08 PROCEDURE — C1887 CATHETER, GUIDING: HCPCS | Performed by: THORACIC SURGERY (CARDIOTHORACIC VASCULAR SURGERY)

## 2021-11-08 PROCEDURE — 93005 ELECTROCARDIOGRAM TRACING: CPT | Performed by: PHYSICIAN ASSISTANT

## 2021-11-08 PROCEDURE — 84132 ASSAY OF SERUM POTASSIUM: CPT

## 2021-11-08 PROCEDURE — 0 CEFUROXIME SODIUM 1.5 G RECONSTITUTED SOLUTION

## 2021-11-08 PROCEDURE — 25010000002 PHENYLEPHRINE 10 MG/ML SOLUTION 1 ML VIAL: Performed by: NURSE ANESTHETIST, CERTIFIED REGISTERED

## 2021-11-08 PROCEDURE — 25010000002 ONDANSETRON PER 1 MG: Performed by: NURSE ANESTHETIST, CERTIFIED REGISTERED

## 2021-11-08 PROCEDURE — 82947 ASSAY GLUCOSE BLOOD QUANT: CPT

## 2021-11-08 PROCEDURE — 33361 REPLACE AORTIC VALVE PERQ: CPT | Performed by: THORACIC SURGERY (CARDIOTHORACIC VASCULAR SURGERY)

## 2021-11-08 PROCEDURE — 25010000002 HEPARIN (PORCINE) PER 1000 UNITS: Performed by: THORACIC SURGERY (CARDIOTHORACIC VASCULAR SURGERY)

## 2021-11-08 DEVICE — VLV HEART TRNSCATH SAPIEN3 29MM: Type: IMPLANTABLE DEVICE | Site: AORTA | Status: FUNCTIONAL

## 2021-11-08 RX ORDER — ACETAMINOPHEN 325 MG/1
650 TABLET ORAL EVERY 4 HOURS PRN
Status: DISCONTINUED | OUTPATIENT
Start: 2021-11-08 | End: 2021-11-09 | Stop reason: HOSPADM

## 2021-11-08 RX ORDER — DEXAMETHASONE SODIUM PHOSPHATE 4 MG/ML
INJECTION, SOLUTION INTRA-ARTICULAR; INTRALESIONAL; INTRAMUSCULAR; INTRAVENOUS; SOFT TISSUE AS NEEDED
Status: DISCONTINUED | OUTPATIENT
Start: 2021-11-08 | End: 2021-11-08 | Stop reason: SURG

## 2021-11-08 RX ORDER — PROMETHAZINE HYDROCHLORIDE 25 MG/1
25 TABLET ORAL ONCE AS NEEDED
Status: CANCELLED | OUTPATIENT
Start: 2021-11-08

## 2021-11-08 RX ORDER — LIDOCAINE HYDROCHLORIDE 20 MG/ML
JELLY TOPICAL AS NEEDED
Status: DISCONTINUED | OUTPATIENT
Start: 2021-11-08 | End: 2021-11-08 | Stop reason: SURG

## 2021-11-08 RX ORDER — SODIUM CHLORIDE 0.9 % (FLUSH) 0.9 %
10 SYRINGE (ML) INJECTION AS NEEDED
Status: DISCONTINUED | OUTPATIENT
Start: 2021-11-08 | End: 2021-11-08 | Stop reason: HOSPADM

## 2021-11-08 RX ORDER — HEPARIN SODIUM 1000 [USP'U]/ML
INJECTION, SOLUTION INTRAVENOUS; SUBCUTANEOUS AS NEEDED
Status: DISCONTINUED | OUTPATIENT
Start: 2021-11-08 | End: 2021-11-08 | Stop reason: SURG

## 2021-11-08 RX ORDER — CHLORHEXIDINE GLUCONATE 500 MG/1
1 CLOTH TOPICAL EVERY 12 HOURS PRN
Status: DISCONTINUED | OUTPATIENT
Start: 2021-11-08 | End: 2021-11-08 | Stop reason: HOSPADM

## 2021-11-08 RX ORDER — FENTANYL CITRATE 50 UG/ML
INJECTION, SOLUTION INTRAMUSCULAR; INTRAVENOUS AS NEEDED
Status: DISCONTINUED | OUTPATIENT
Start: 2021-11-08 | End: 2021-11-08 | Stop reason: SURG

## 2021-11-08 RX ORDER — ASPIRIN 81 MG/1
81 TABLET ORAL DAILY
Status: DISCONTINUED | OUTPATIENT
Start: 2021-11-09 | End: 2021-11-09 | Stop reason: HOSPADM

## 2021-11-08 RX ORDER — LIDOCAINE HYDROCHLORIDE 10 MG/ML
0.5 INJECTION, SOLUTION EPIDURAL; INFILTRATION; INTRACAUDAL; PERINEURAL ONCE AS NEEDED
Status: DISCONTINUED | OUTPATIENT
Start: 2021-11-08 | End: 2021-11-08 | Stop reason: HOSPADM

## 2021-11-08 RX ORDER — MEPERIDINE HYDROCHLORIDE 25 MG/ML
12.5 INJECTION INTRAMUSCULAR; INTRAVENOUS; SUBCUTANEOUS
Status: CANCELLED | OUTPATIENT
Start: 2021-11-08 | End: 2021-11-09

## 2021-11-08 RX ORDER — CHLORHEXIDINE GLUCONATE 0.12 MG/ML
15 RINSE ORAL ONCE
Status: COMPLETED | OUTPATIENT
Start: 2021-11-08 | End: 2021-11-08

## 2021-11-08 RX ORDER — HYDROCODONE BITARTRATE AND ACETAMINOPHEN 7.5; 325 MG/1; MG/1
1 TABLET ORAL EVERY 4 HOURS PRN
Status: DISCONTINUED | OUTPATIENT
Start: 2021-11-08 | End: 2021-11-09 | Stop reason: HOSPADM

## 2021-11-08 RX ORDER — EPHEDRINE SULFATE 50 MG/ML
INJECTION, SOLUTION INTRAVENOUS AS NEEDED
Status: DISCONTINUED | OUTPATIENT
Start: 2021-11-08 | End: 2021-11-08 | Stop reason: SURG

## 2021-11-08 RX ORDER — IPRATROPIUM BROMIDE AND ALBUTEROL SULFATE 2.5; .5 MG/3ML; MG/3ML
3 SOLUTION RESPIRATORY (INHALATION) ONCE AS NEEDED
Status: CANCELLED | OUTPATIENT
Start: 2021-11-08

## 2021-11-08 RX ORDER — HYDRALAZINE HYDROCHLORIDE 20 MG/ML
10 INJECTION INTRAMUSCULAR; INTRAVENOUS EVERY 6 HOURS PRN
Status: DISCONTINUED | OUTPATIENT
Start: 2021-11-08 | End: 2021-11-09 | Stop reason: HOSPADM

## 2021-11-08 RX ORDER — SODIUM CHLORIDE 0.9 % (FLUSH) 0.9 %
3-10 SYRINGE (ML) INJECTION AS NEEDED
Status: CANCELLED | OUTPATIENT
Start: 2021-11-08

## 2021-11-08 RX ORDER — NICARDIPINE HYDROCHLORIDE 2.5 MG/ML
INJECTION INTRAVENOUS AS NEEDED
Status: DISCONTINUED | OUTPATIENT
Start: 2021-11-08 | End: 2021-11-08 | Stop reason: SURG

## 2021-11-08 RX ORDER — LIDOCAINE HYDROCHLORIDE 10 MG/ML
INJECTION, SOLUTION EPIDURAL; INFILTRATION; INTRACAUDAL; PERINEURAL AS NEEDED
Status: DISCONTINUED | OUTPATIENT
Start: 2021-11-08 | End: 2021-11-08 | Stop reason: SURG

## 2021-11-08 RX ORDER — ETOMIDATE 2 MG/ML
INJECTION INTRAVENOUS AS NEEDED
Status: DISCONTINUED | OUTPATIENT
Start: 2021-11-08 | End: 2021-11-08 | Stop reason: SURG

## 2021-11-08 RX ORDER — ROCURONIUM BROMIDE 10 MG/ML
INJECTION, SOLUTION INTRAVENOUS AS NEEDED
Status: DISCONTINUED | OUTPATIENT
Start: 2021-11-08 | End: 2021-11-08 | Stop reason: SURG

## 2021-11-08 RX ORDER — MULTIPLE VITAMINS W/ MINERALS TAB 9MG-400MCG
1 TAB ORAL DAILY
COMMUNITY

## 2021-11-08 RX ORDER — SODIUM CHLORIDE, SODIUM LACTATE, POTASSIUM CHLORIDE, CALCIUM CHLORIDE 600; 310; 30; 20 MG/100ML; MG/100ML; MG/100ML; MG/100ML
9 INJECTION, SOLUTION INTRAVENOUS CONTINUOUS
Status: DISCONTINUED | OUTPATIENT
Start: 2021-11-08 | End: 2021-11-09 | Stop reason: HOSPADM

## 2021-11-08 RX ORDER — PHENYLEPHRINE HCL IN 0.9% NACL 0.5 MG/5ML
.5-3 SYRINGE (ML) INTRAVENOUS
Status: DISCONTINUED | OUTPATIENT
Start: 2021-11-08 | End: 2021-11-08 | Stop reason: HOSPADM

## 2021-11-08 RX ORDER — PROTAMINE SULFATE 10 MG/ML
INJECTION, SOLUTION INTRAVENOUS AS NEEDED
Status: DISCONTINUED | OUTPATIENT
Start: 2021-11-08 | End: 2021-11-08 | Stop reason: SURG

## 2021-11-08 RX ORDER — MIDAZOLAM HYDROCHLORIDE 1 MG/ML
0.5 INJECTION INTRAMUSCULAR; INTRAVENOUS
Status: DISCONTINUED | OUTPATIENT
Start: 2021-11-08 | End: 2021-11-08 | Stop reason: HOSPADM

## 2021-11-08 RX ORDER — ONDANSETRON 2 MG/ML
4 INJECTION INTRAMUSCULAR; INTRAVENOUS EVERY 6 HOURS PRN
Status: DISCONTINUED | OUTPATIENT
Start: 2021-11-08 | End: 2021-11-09 | Stop reason: HOSPADM

## 2021-11-08 RX ORDER — SODIUM CHLORIDE 0.9 % (FLUSH) 0.9 %
3 SYRINGE (ML) INJECTION EVERY 12 HOURS SCHEDULED
Status: CANCELLED | OUTPATIENT
Start: 2021-11-08

## 2021-11-08 RX ORDER — NITROGLYCERIN 0.4 MG/1
0.4 TABLET SUBLINGUAL
Status: DISCONTINUED | OUTPATIENT
Start: 2021-11-08 | End: 2021-11-08 | Stop reason: HOSPADM

## 2021-11-08 RX ORDER — BUPIVACAINE HCL/0.9 % NACL/PF 0.125 %
PLASTIC BAG, INJECTION (ML) EPIDURAL AS NEEDED
Status: DISCONTINUED | OUTPATIENT
Start: 2021-11-08 | End: 2021-11-08 | Stop reason: SURG

## 2021-11-08 RX ORDER — SODIUM CHLORIDE 0.9 % (FLUSH) 0.9 %
10 SYRINGE (ML) INJECTION EVERY 12 HOURS SCHEDULED
Status: DISCONTINUED | OUTPATIENT
Start: 2021-11-08 | End: 2021-11-08 | Stop reason: HOSPADM

## 2021-11-08 RX ORDER — ONDANSETRON 2 MG/ML
4 INJECTION INTRAMUSCULAR; INTRAVENOUS ONCE AS NEEDED
Status: CANCELLED | OUTPATIENT
Start: 2021-11-08

## 2021-11-08 RX ORDER — PROMETHAZINE HYDROCHLORIDE 25 MG/1
25 SUPPOSITORY RECTAL ONCE AS NEEDED
Status: CANCELLED | OUTPATIENT
Start: 2021-11-08

## 2021-11-08 RX ORDER — SODIUM CHLORIDE 9 MG/ML
250 INJECTION, SOLUTION INTRAVENOUS ONCE AS NEEDED
Status: DISCONTINUED | OUTPATIENT
Start: 2021-11-08 | End: 2021-11-09 | Stop reason: HOSPADM

## 2021-11-08 RX ORDER — LABETALOL HYDROCHLORIDE 5 MG/ML
10 INJECTION, SOLUTION INTRAVENOUS
Status: DISCONTINUED | OUTPATIENT
Start: 2021-11-08 | End: 2021-11-09 | Stop reason: HOSPADM

## 2021-11-08 RX ORDER — HYDRALAZINE HYDROCHLORIDE 20 MG/ML
5 INJECTION INTRAMUSCULAR; INTRAVENOUS
Status: CANCELLED | OUTPATIENT
Start: 2021-11-08

## 2021-11-08 RX ORDER — ONDANSETRON 4 MG/1
4 TABLET, FILM COATED ORAL EVERY 6 HOURS PRN
Status: DISCONTINUED | OUTPATIENT
Start: 2021-11-08 | End: 2021-11-09 | Stop reason: HOSPADM

## 2021-11-08 RX ORDER — VITAMIN E 268 MG
1 CAPSULE ORAL DAILY
COMMUNITY

## 2021-11-08 RX ORDER — MORPHINE SULFATE 2 MG/ML
2 INJECTION, SOLUTION INTRAMUSCULAR; INTRAVENOUS
Status: DISCONTINUED | OUTPATIENT
Start: 2021-11-08 | End: 2021-11-09 | Stop reason: HOSPADM

## 2021-11-08 RX ORDER — SODIUM CHLORIDE 9 MG/ML
100 INJECTION, SOLUTION INTRAVENOUS CONTINUOUS
Status: ACTIVE | OUTPATIENT
Start: 2021-11-08 | End: 2021-11-08

## 2021-11-08 RX ORDER — DROPERIDOL 2.5 MG/ML
0.62 INJECTION, SOLUTION INTRAMUSCULAR; INTRAVENOUS AS NEEDED
Status: CANCELLED | OUTPATIENT
Start: 2021-11-08

## 2021-11-08 RX ORDER — ACETAMINOPHEN 325 MG/1
650 TABLET ORAL EVERY 4 HOURS PRN
Status: DISCONTINUED | OUTPATIENT
Start: 2021-11-08 | End: 2021-11-08 | Stop reason: HOSPADM

## 2021-11-08 RX ORDER — NALOXONE HCL 0.4 MG/ML
0.4 VIAL (ML) INJECTION AS NEEDED
Status: CANCELLED | OUTPATIENT
Start: 2021-11-08

## 2021-11-08 RX ORDER — DROPERIDOL 2.5 MG/ML
0.62 INJECTION, SOLUTION INTRAMUSCULAR; INTRAVENOUS ONCE AS NEEDED
Status: CANCELLED | OUTPATIENT
Start: 2021-11-08

## 2021-11-08 RX ORDER — HYDROMORPHONE HYDROCHLORIDE 1 MG/ML
0.5 INJECTION, SOLUTION INTRAMUSCULAR; INTRAVENOUS; SUBCUTANEOUS
Status: CANCELLED | OUTPATIENT
Start: 2021-11-08

## 2021-11-08 RX ORDER — FAMOTIDINE 20 MG/1
20 TABLET, FILM COATED ORAL ONCE
Status: COMPLETED | OUTPATIENT
Start: 2021-11-08 | End: 2021-11-08

## 2021-11-08 RX ORDER — LABETALOL HYDROCHLORIDE 5 MG/ML
5 INJECTION, SOLUTION INTRAVENOUS
Status: CANCELLED | OUTPATIENT
Start: 2021-11-08

## 2021-11-08 RX ORDER — HYDROCODONE BITARTRATE AND ACETAMINOPHEN 5; 325 MG/1; MG/1
1 TABLET ORAL ONCE AS NEEDED
Status: CANCELLED | OUTPATIENT
Start: 2021-11-08 | End: 2021-11-18

## 2021-11-08 RX ORDER — FENTANYL CITRATE 50 UG/ML
50 INJECTION, SOLUTION INTRAMUSCULAR; INTRAVENOUS
Status: CANCELLED | OUTPATIENT
Start: 2021-11-08

## 2021-11-08 RX ORDER — SODIUM CHLORIDE 9 MG/ML
INJECTION, SOLUTION INTRAVENOUS AS NEEDED
Status: DISCONTINUED | OUTPATIENT
Start: 2021-11-08 | End: 2021-11-08 | Stop reason: HOSPADM

## 2021-11-08 RX ORDER — ONDANSETRON 2 MG/ML
INJECTION INTRAMUSCULAR; INTRAVENOUS AS NEEDED
Status: DISCONTINUED | OUTPATIENT
Start: 2021-11-08 | End: 2021-11-08 | Stop reason: SURG

## 2021-11-08 RX ADMIN — FAMOTIDINE 20 MG: 20 TABLET ORAL at 10:13

## 2021-11-08 RX ADMIN — SODIUM CHLORIDE 100 ML/HR: 9 INJECTION, SOLUTION INTRAVENOUS at 12:00

## 2021-11-08 RX ADMIN — LIDOCAINE HYDROCHLORIDE 2 ML: 20 JELLY TOPICAL at 10:29

## 2021-11-08 RX ADMIN — SODIUM CHLORIDE, POTASSIUM CHLORIDE, SODIUM LACTATE AND CALCIUM CHLORIDE 9 ML/HR: 600; 310; 30; 20 INJECTION, SOLUTION INTRAVENOUS at 10:13

## 2021-11-08 RX ADMIN — DEXAMETHASONE SODIUM PHOSPHATE 4 MG: 4 INJECTION, SOLUTION INTRA-ARTICULAR; INTRALESIONAL; INTRAMUSCULAR; INTRAVENOUS; SOFT TISSUE at 10:44

## 2021-11-08 RX ADMIN — ETOMIDATE 22 MG: 2 INJECTION, SOLUTION INTRAVENOUS at 10:28

## 2021-11-08 RX ADMIN — LIDOCAINE HYDROCHLORIDE 50 MG: 10 INJECTION, SOLUTION EPIDURAL; INFILTRATION; INTRACAUDAL; PERINEURAL at 10:28

## 2021-11-08 RX ADMIN — Medication 200 MCG: at 11:05

## 2021-11-08 RX ADMIN — PHENYLEPHRINE HYDROCHLORIDE 0.5 MCG/KG/MIN: 10 INJECTION INTRAVENOUS at 10:37

## 2021-11-08 RX ADMIN — NICARDIPINE HYDROCHLORIDE 0.3 MG: 25 INJECTION INTRAVENOUS at 11:08

## 2021-11-08 RX ADMIN — ONDANSETRON 4 MG: 2 INJECTION INTRAMUSCULAR; INTRAVENOUS at 10:44

## 2021-11-08 RX ADMIN — HEPARIN SODIUM 17000 UNITS: 1000 INJECTION, SOLUTION INTRAVENOUS; SUBCUTANEOUS at 10:54

## 2021-11-08 RX ADMIN — SODIUM CHLORIDE 10 MG/HR: 9 INJECTION, SOLUTION INTRAVENOUS at 13:00

## 2021-11-08 RX ADMIN — EPHEDRINE SULFATE 5 MG: 50 INJECTION INTRAVENOUS at 10:39

## 2021-11-08 RX ADMIN — CHLORHEXIDINE GLUCONATE 0.12% ORAL RINSE 15 ML: 1.2 LIQUID ORAL at 10:13

## 2021-11-08 RX ADMIN — SUGAMMADEX 200 MG: 100 INJECTION, SOLUTION INTRAVENOUS at 11:18

## 2021-11-08 RX ADMIN — NICARDIPINE HYDROCHLORIDE 0.3 MG: 25 INJECTION INTRAVENOUS at 11:21

## 2021-11-08 RX ADMIN — ROCURONIUM BROMIDE 50 MG: 10 INJECTION, SOLUTION INTRAVENOUS at 10:28

## 2021-11-08 RX ADMIN — NICARDIPINE HYDROCHLORIDE 5 MG/HR: 0.1 INJECTION, SOLUTION INTRAVENOUS at 11:21

## 2021-11-08 RX ADMIN — FENTANYL CITRATE 100 MCG: 50 INJECTION, SOLUTION INTRAMUSCULAR; INTRAVENOUS at 10:28

## 2021-11-08 RX ADMIN — EPHEDRINE SULFATE 10 MG: 50 INJECTION INTRAVENOUS at 10:41

## 2021-11-08 RX ADMIN — PROTAMINE SULFATE 170 MG: 10 INJECTION, SOLUTION INTRAVENOUS at 11:12

## 2021-11-08 RX ADMIN — MUPIROCIN 1 APPLICATION: 20 OINTMENT TOPICAL at 10:13

## 2021-11-08 NOTE — H&P
Pre-Op H&P  Ron Villa  [id number]  1955      Chief complaint: Shortness of breath      Subjective:  Patient is a 66 y.o.male presents for scheduled surgery by Dr. Tripp. He anticipates a TRANSCATHETER AORTIC VALVE REPLACEMENT; TRANSESOPHAGEAL ECHOCARDIOGRAM WITH ANESTHESIA today. He had left heart cath on 8/27/21 and was found to have severe aortic stenosis. He states that he has had exertional dyspnea for almost a year.  He states it is gotten worse in the last few months.  He states that it sometimes occurs even at rest.  States it usually resolves within about 5 minutes or so.  Denies chest pain, palpitations, syncope, or diaphoresis.      Review of Systems:  Constitutional-- No fever, chills or sweats. + fatigue.  CV-- No chest pain, palpitation or syncope. +CHACKO  Resp-- No cough, hemoptysis. +SOB  Skin--No rashes or lesions      Allergies: No Known Allergies      Home Meds:  Medications Prior to Admission   Medication Sig Dispense Refill Last Dose   • aspirin 81 MG EC tablet Take 81 mg by mouth Daily.      • cholecalciferol (VITAMIN D3) 25 MCG (1000 UT) tablet Take 1,000 Units by mouth Daily.      • fexofenadine (ALLEGRA) 180 MG tablet Take 180 mg by mouth Daily.      • meloxicam (MOBIC) 7.5 MG tablet Take 7.5 mg by mouth Daily.      • Omega-3 Fatty Acids (fish oil) 1000 MG capsule capsule Take 1,000 mg by mouth Daily With Breakfast.      • PANTOPRAZOLE SODIUM PO Take  by mouth. UNSURE OF DOSE      • vitamin B-12 (CYANOCOBALAMIN) 1000 MCG tablet Take 1,000 mcg by mouth Daily.      • vitamin C (ASCORBIC ACID) 250 MG tablet Take 250 mg by mouth Daily.            PMH:   Past Medical History:   Diagnosis Date   • Aortic stenosis, severe 07/2021   • Arthritis    • Cancer (HCC)    • GERD (gastroesophageal reflux disease)    • Sleep apnea    • Wears glasses      PSH:    Past Surgical History:   Procedure Laterality Date   • CARDIAC CATHETERIZATION     • CARDIAC CATHETERIZATION Left 8/27/2021    Procedure:  Right and Left Heart Cath--C-19 ORDER FAXED TO [facility];  Surgeon: Power Burgos MD;  Location:  [facility];  Service: Cardiology;  Laterality: Left;  Week of    • COLONOSCOPY     • HERNIA REPAIR         Immunization History:  Influenza:   Pneumococcal: UTD  Tetanus: UTD  Covid x3:     Social History:   Tobacco:   Social History     Tobacco Use   Smoking Status Former Smoker   • Types: Cigarettes   • Quit date:    • Years since quittin.8   Smokeless Tobacco Current User   • Types: Chew      Alcohol:     Social History     Substance and Sexual Activity   Alcohol Use Yes   • Alcohol/week: 2.0 standard drinks   • Types: 2 Cans of beer per week    Comment: daily         Physical Exam: VS: /83  HR 70  RR 16  T 97.9  Sat 99%RA      General Appearance:    Alert, cooperative, no distress, appears stated age   Head:    Normocephalic, without obvious abnormality, atraumatic   Lungs:     Clear to auscultation bilaterally, respirations unlabored    Heart:   Regular rate and rhythm, S1 and S2 normal    Abdomen:    Soft without tenderness   Extremities:   Extremities normal, atraumatic, no cyanosis or edema   Skin:   Skin color, texture, turgor normal, no rashes or lesions   Neurologic:   Grossly intact     Results Review:     LABS:  Lab Results   Component Value Date    WBC 4.88 2021    HGB 16.3 2021    HCT 48.5 2021    MCV 94.7 2021     2021    NEUTROABS 2.26 2021    GLUCOSE 99 2021    BUN 15 2021    CREATININE 0.97 2021    EGFRIFNONA 77 2021     2021    K 4.9 2021     2021    CO2 26.0 2021    MG 2.3 2021    CALCIUM 9.3 2021    ALBUMIN 4.50 2021    AST 31 2021    ALT 42 (H) 2021    BILITOT 0.5 2021       RADIOLOGY:  21 heart cath:  Final Impression: #1: Normal left ventricular systolic function (noninvasive data).  Forward cardiac output  and index are normal.  Right heart pressures are normal.                             #2:  Essentially normal coronary arteries.                             #3:  This patient has symptomatic severe (by noninvasive/invasive evaluation) valvular aortic stenosis.    I reviewed the patient's new clinical results.    Cancer Staging (if applicable)  Cancer Patient: __ yes __no __unknown; If yes, clinical stage T:__ N:__M:__, stage group or __N/A      Impression: Severe aortic stenosis       Plan: TRANSCATHETER AORTIC VALVE REPLACEMENT; TRANSESOPHAGEAL ECHOCARDIOGRAM WITH ANESTHESIA      Josiane Meadows, BAL   11/8/2021   09:51 EST

## 2021-11-08 NOTE — PROGRESS NOTES
"Alexis Rahman, APRN   APRN NOTE    Ron Villa is a 66 y.o. male, former smoker, with a past medical history significant for arthritis, \"heart murmur\", dyspnea, cancer, sleep apnea, and GERD.  Patient has had worsening chest pain and shortness of air for approximately 1 year which has progressively worsened.  A stress echo was obtained on 7/12/2021 noting severe AS and he was referred to cardiology who recommended TAVR work-up.  On 8/27/2021 he underwent LHC which found normal coronary arteries, and a severely calcified aortic valve.  He was seen in consult by cardiothoracic surgery.  On 9/10/2021 TERRI was attempted, however the probe was unable to be passed beyond the upper esophagus due to suspected stricture.       [x]   Information obtained by review of electronic health records.  []   Information obtained by face-to-face contact with the patient.    INTENSIVIST   PROGRESS NOTE     Hospital:  LOS: 0 days       S     Mr. Ron Villa, 66 y.o. male is followed for: No chief complaint on file.       S/P TAVR  Perioperative medical management of comorbid conditions, including glycemic control and electrolytes disorders.    As an Intensivist, we provide an integrated approach to the ICU patient and family, medical management of comorbid conditions, including but not limited to electrolytes, glycemic control, organ dysfunction, lead interdisciplinary rounds and coordinate the care with all other services, including those from other specialists.     Interval History:  POD: Day of Surgery    He was seen in 2H ICU upon arrival from OR.    He was admitted on 11/8/2021 for an elective TAVR due to Aortic Stenosis.    Events from surgery were reviewed.    At present, he is doing well. Comfortable. No distress. No chest pain.     PMH: He  has a past medical history of Aortic stenosis, severe (07/2021), Arthritis, Cancer (HCC), GERD (gastroesophageal reflux disease), Sleep apnea, and Wears glasses.   PSxH: He  has a " past surgical history that includes Hernia repair; Cardiac catheterization; Cardiac catheterization (Left, 8/27/2021); and Colonoscopy.      Medications:  No current facility-administered medications on file prior to encounter.     Current Outpatient Medications on File Prior to Encounter   Medication Sig   • aspirin 81 MG EC tablet Take 81 mg by mouth Daily.   • fexofenadine (ALLEGRA) 180 MG tablet Take 180 mg by mouth Daily.   • meloxicam (MOBIC) 7.5 MG tablet Take 7.5 mg by mouth Daily.   • Misc Natural Products (OSTEO BI-FLEX TRIPLE STRENGTH PO) Take 1 tablet by mouth Daily.   • multivitamin with minerals (Centrum Silver 50+Men) tablet tablet Take 1 tablet by mouth Daily.   • Omega-3 Fatty Acids (FISH OIL PO) Take 1,400 mg by mouth Daily With Breakfast.   • vitamin B-12 (CYANOCOBALAMIN) 1000 MCG tablet Take 1,000 mcg by mouth Daily.   • vitamin C (ASCORBIC ACID) 250 MG tablet Take 500 mg by mouth Daily.   • vitamin D3 125 MCG (5000 UT) capsule capsule Take 1,000 Units by mouth Daily.   • Vitamin E 180 MG (400 UNIT) capsule capsule Take 1 capsule by mouth Daily.       Allergies: He has No Known Allergies.   FH: His family history includes Heart attack in his father; Heart disease in his mother.   SH: He  reports that he quit smoking about 45 years ago. His smoking use included cigarettes. His smokeless tobacco use includes chew. He reports current alcohol use of about 2.0 standard drinks of alcohol per week. He reports that he does not use drugs.     The patient's relevant past medical, surgical and social history were reviewed and updated in Epic as appropriate.       History     Last Reviewed by Adria Henderson MD on 11/8/2021 at  3:16 PM    Sections Reviewed    Medical, Family, Surgical, Tobacco, Alcohol, Drug Use, Sexual Activity,   Social Documentation    Problem list reviewed by Adria Henderson MD on 11/8/2021 at  3:16 PM  Problem list reviewed by Adria Henderson MD on 11/8/2021 at  3:17 PM  Medicines  reviewed by Adria Henderson MD on 11/8/2021 at  3:17 PM  Allergies reviewed by Adria Henderson MD on 11/8/2021 at  3:16 PM       ROS:   ROS as described in the HPI. All other systems are negative.        O     Vitals:  Temp: 98.6 °F (37 °C) (11/08/21 1600) Temp  Min: 97.6 °F (36.4 °C)  Max: 98.6 °F (37 °C)   BP: 129/76 (11/08/21 1900) BP  Min: 116/79  Max: 151/105   Pulse: 87 (11/08/21 1900) Pulse  Min: 78  Max: 92   Resp: 16 (11/08/21 1800) Resp  Min: 16  Max: 18   SpO2: 96 % (11/08/21 1900) SpO2  Min: 93 %  Max: 98 %   Device: nasal cannula (11/08/21 1800)    Flow Rate: 3 (11/08/21 1800) Flow (L/min)  Min: 2  Max: 3     Intake/Ouptut 24 hrs (7:00AM - 6:59 AM)  Intake & Output (last 3 days)       11/06 0701  11/07 0700 11/07 0701  11/08 0700 11/08 0701  11/09 0700    I.V. (mL/kg)   775 (6.9)    Total Intake(mL/kg)   775 (6.9)    Urine (mL/kg/hr)   1000    Total Output   1000    Net   -225                 Medications (drips):  lactated ringers, Last Rate: 9 mL/hr (11/08/21 1013)  niCARdipine, Last Rate: 10 mg/hr (11/08/21 1300)        Physical Examination  Telemetry:  Rhythm: normal sinus rhythm (11/08/21 1800)         Constitutional:  No distress.   Cardiovascular: RRR.   Normal heart sounds.  No murmurs, gallop or rub.   Respiratory: Normal breath sounds  No adventitious sounds.   Abdominal:  Soft with no tenderness.  No distension.   No HSM.   Extremities: Warm.  Dry.  No cyanosis.  No edema.   Neurological:   Alert.  Best Eye Response: 4-->(E4) spontaneous (11/08/21 1800)  Best Motor Response: 6-->(M6) obeys commands (11/08/21 1800)  Best Verbal Response: 5-->(V5) oriented (11/08/21 1800)  [place] Coma Scale Score: 15 (11/08/21 1800)     Hematology:  Results from last 7 days   Lab Units 11/08/21  1241 11/05/21  1313   WBC 10*3/mm3 9.50 4.88   HEMOGLOBIN g/dL 15.5 16.3   MCV fL 93.5 94.7   PLATELETS 10*3/mm3 167 175   NEUTROPHIL % %  --  46.3   LYMPHOCYTE % %  --  38.1   NEUTROS ABS 10*3/mm3  --  2.26   LYMPHS  ABS 10*3/mm3  --  1.86   EOS ABS 10*3/mm3  --  0.19   IMMATURE GRANS (ABS) 10*3/mm3  --  0.02     Chemistry:  Estimated Creatinine Clearance: 103.4 mL/min (by C-G formula based on SCr of 0.94 mg/dL).  Results from last 7 days   Lab Units 11/08/21  1241 11/05/21  1313   SODIUM mmol/L 139 139   POTASSIUM mmol/L 3.9 4.9   CHLORIDE mmol/L 106 103   CO2 mmol/L 23.0 26.0   BUN mg/dL 13 15   CREATININE mg/dL 0.94 0.97   GLUCOSE mg/dL 119* 99     Results from last 7 days   Lab Units 11/08/21  1241 11/05/21  1313   CALCIUM mg/dL 8.5* 9.3   MAGNESIUM mg/dL  --  2.3     COVID-19  Lab Results   Component Value Date    COVID19 Not Detected 11/05/2021    COVID19 Not Detected 09/10/2021    COVID19 Not Detected 09/07/2021    COVID19 Not Detected 08/24/2021     Images:  No radiology results for the last day    Echo:  Results for orders placed during the hospital encounter of 09/09/21    Adult Transesophageal Echo (TERRI) W/ Cont if Necessary Per Protocol    Interpretation Summary  · The patient was sedated with intravenous Versed and Brevital.  · TERRI was attempted and was unsuccessful due to failure to pass the probe below the level of upper esophagus. Esophageal stricture was suspected.  · The patient reports a history of dysphagia, EGD is recommended before any additional attempts of TERRI.      Results: Reviewed.  I reviewed the patient's new laboratory and imaging results.  I independently reviewed the patient's new images.    Medications: Reviewed.    Assessment/Plan   A / P     Assessment:    66 y.o.male, admitted on 11/8/2021 with Aortic stenosis, severe [I35.0]  AVD (aortic valve disease) [I35.9]:     Chest pain and dypspnea for approximately 1 year which has progressively worsened.    A stress echo was obtained on 7/12/2021 noting severe AS and he was referred to cardiology who recommended TAVR work-up.    On 8/27/2021 he underwent LHC which found normal coronary arteries, and a severely calcified aortic valve.      1. Aortic  Stenosis, severe. .  Procedure(s) (LRB):  TRANSCATHETER AORTIC VALVE REPLACEMENT (N/A)  TRANSESOPHAGEAL ECHOCARDIOGRAM WITH ANESTHESIA (N/A)  Transcatheter Aortic Valve Replacement (N/A)   Dr. Prieto Tripp (Cardiothoracic Surgery)   11/08/21     2. Surgical antimicrobial prophylaxis: Cefuroxime  3. DANNY  4. Former smoker.  5. FERD  6. No h/o DM    Results from last 7 days   Lab Units 11/08/21  1140 11/08/21  0952   GLUCOSE mg/dL 100 94     Lab Results   Lab Value Date/Time    HGBA1C 5.70 (H) 11/05/2021 1313    HGBA1C 5.50 08/27/2021 0927       Diet: Diet Regular; Consistent Carbohydrate, Cardiac   Advance Directives: Code Status and Medical Interventions:   Ordered at: 11/08/21 1150     Code Status (Patient has no pulse and is not breathing):    CPR (Attempt to Resuscitate)     Medical Interventions (Patient has pulse or is breathing):    Full Support        Plan:    1. ICU post operative medical management.  2. Hemodynamic management. Adequate preload.  3. Nicardipine infusion as clinically indicated.  4. Urinary Output > = 0.5 mL/kg/hr  5. Watch for arrhythmias.  6. Watch for bleeding.  7. Insulin SQ as clinically indicated. Avoid oral antidiabetic medications. Target glucose < = 180 mg/dL.     Plan of care and goals reviewed during interdisciplinary rounds.  I discussed the patient's findings and my recommendations with patient and nursing staff

## 2021-11-08 NOTE — OP NOTE
PROCEDURE(S):   1. 6F femoral arterial sheath placement.  2. 8F femoral venous sheath placement.  3. Temporary transvenous pacemaker insertion.  4. Catheter placement in the aortic root.  5. Multiple intraprocedural aortograms.   6. Transcatheter aortic valve replacement with 29-1 mm Nix ANTHONY 3 pericardial  prosthesis.     INDICATIONS:   1. Critical aortic stenosis.    INTERVENTIONAL CARDIOLOGISTS:  Dorie White MD.      CARDIAC SURGEONS:   Prieto Tripp MD.  Slim Clarke MD    DESCRIPTION OF PROCEDURE:   After informed consent, the patient was brought to the OR in a fasting condition. The patient was prepped and draped from level of chin to  knees by standard surgical technique.  Right femoral arterial access was obtained by percutaneous anterior wall puncture technique and an 6-Fijian arterial sheath was placed.  Venous access was obtained in similar fashion and a 8-Fijian venous sheath was placed.     A temporary transvenous pacing electrode was inserted via the left femoral venous access and advanced to the right ventricular apex and excellent pacing/sensing was noted. A 5F pigtail catheter was advanced from the femoral arterial access and this was advanced to the aortic root, and intraprocedural aortography was performed to confirm the implantation angle.    At this time, left femoral arterial access was obtained by  Dr. Tripp and Dr. Clarke and an Nix sheath was placed (see separate note). Using this access, AL1 catheter, and straight wire, the aortic valve was crossed. The catheter was advanced into the left ventricle and the wire was exchanged for a Safari wire. At this time, a 29-1 mm tissue Nix ANTHONY 3 valve was advanced using standard delivery system. This was positioned under fluoroscopy. After the satisfactory position was confirmed, the valve was expanded under rapid pacing protocol. Satisfactory position was noted. Post implant images revealed trivial aortic insufficiency  which was paravalvular. No significant central regurgitation was noted.     At this time, the delivery system was removed. The arterial sheath was removed and the arteriotomy was closed by the surgeons (see separate note). The patient tolerated the procedure well and without complications.    FINAL IMPRESSION:   · Successful transcatheter aortic valve replacement with 29-1 mm        Nix ANTHONY 3 tissue valve.   · No acute procedure-related complications.     Dorie White MD, FACC

## 2021-11-08 NOTE — ANESTHESIA POSTPROCEDURE EVALUATION
Patient: Ron Villa    Procedure Summary     Date: 11/08/21 Room / Location: [facility] OR 01 / [facility]    Anesthesia Start: 1019 Anesthesia Stop: 1137    Procedures:       TRANSCATHETER AORTIC VALVE REPLACEMENT (N/A Chest)      TRANSESOPHAGEAL ECHOCARDIOGRAM WITH ANESTHESIA (N/A Chest)      Transcatheter Aortic Valve Replacement (N/A ) Diagnosis:       Aortic stenosis, severe      (Aortic stenosis, severe [I35.0])    Surgeons: Prieto Tripp MD; Dorie White MD Provider: Monica Marcus MD    Anesthesia Type: general ASA Status: 4          Anesthesia Type: general    Vitals  No vitals data found for the desired time range.          Post Anesthesia Care and Evaluation    Patient location during evaluation: ICU  Patient participation: complete - patient participated  Level of consciousness: awake and alert  Pain score: 0  Pain management: adequate  Airway patency: patent  Anesthetic complications: No anesthetic complications  PONV Status: none  Cardiovascular status: hemodynamically stable and acceptable  Respiratory status: nonlabored ventilation, acceptable, nasal cannula and spontaneous ventilation  Hydration status: acceptable    Comments: Handoff given to ICU RN at bedside.

## 2021-11-08 NOTE — ANESTHESIA PROCEDURE NOTES
Airway  Urgency: elective    Date/Time: 11/8/2021 10:29 AM  Airway not difficult    General Information and Staff    Patient location during procedure: OR  CRNA: Parish Power III, CRNA    Indications and Patient Condition  Indications for airway management: airway protection    Preoxygenated: yes  MILS not maintained throughout  Mask difficulty assessment: 0 - not attempted    Final Airway Details  Final airway type: endotracheal airway      Successful airway: ETT  Cuffed: yes   Successful intubation technique: direct laryngoscopy  Blade: Adrián  Blade size: 3  ETT size (mm): 7.5  Cormack-Lehane Classification: grade IIb - view of arytenoids or posterior of glottis only  Placement verified by: chest auscultation and capnometry   Measured from: lips  ETT/EBT  to lips (cm): 21  Number of attempts at approach: 1  Assessment: lips, teeth, and gum same as pre-op and atraumatic intubation    Additional Comments  Negative epigastric sounds, Breath sound equal bilaterally with symmetric chest rise and fall.

## 2021-11-08 NOTE — OP NOTE
Operative Report  Ron Villa  [id number]  1955    Preop Diagnosis: Severe aortic stenosis        Postop Diagnosis same        Procedure: #1 TAVR with Nix ANTHONY #29 tissue valve #2 completion left iliac and femoral angiogram        Surgeons: Prieto Tripp        Assistant: Slim Infante cardiologist Dorie White    was responsible for performing the following activities: Retraction, Suction and Placing Dressing and their skilled assistance was necessary for the success of this case.       Operative Findings:         Description: Patient was brought to the operating room placed under general anesthesia.  Patient's chest abdomen groins were prepped and draped in usual sterile fashion.  Dr. White placed right femoral venous and arterial sheaths.  A temporary pacemaker wire was placed.  A pigtail catheter was placed in the right coronary sinus and confirmed a proper angulation.  At this time the left groin was stuck using modified Seldinger technique.  The patient was heparinized activating clotting times confirmed adequate.  At this time 2 Perclose was placed per protocol.  A 7 Nigerian sheath was inserted.  The iliac and aorta was transversed using a Magic torque wire.  This was exchanged for an Amplatz wire.  A 16 Nigerian sheath was inserted without difficulty.  At this time an AL-1 with straight wire was used to cross the valve.  This was exchanged for a safari wire.  At this time the device was placed in the descending aorta and the sheath.  It was advanced across the transverse arch and across the aortic valve.  It was the sheath at this point and angiography confirmed excellent positioning.  With rapid ventricular pacing 180 bpm the valve was deployed.  TERRI revealed only trivial aortic insufficiency.  The device was removed as was the wire.  Angiography confirmed excellent result with only trivial aortic insufficiency.  At this time the device was removed.  The sheath was removed the 2  Perclose was were tied down.  The pigtail catheter is placed at L2.  Angiography was done of the left iliac and femoral vessels.  This revealed excellent flow to the left leg.  There was no evidence of extravasation or compromise.  The patient was reversed with protamine.  The radiation dose was 370 mGy, fluoroscopy time 6 minutes and 18 seconds, contrast 85 cc of Isovue 370.        EBL: Less than 100 cc      Please note that portions of this note were completed with a voice recognition program. Efforts were made to edit the dictations, but words may be mistranscribed      Prieto Tripp MD  11/08/21 11:15 EST

## 2021-11-08 NOTE — NURSING NOTE
"          TAVR Multidisciplinary Team Meeting         Patient Name: Ron Villa    YOB: 1955     Referring Physician:  Power Burgos MD  Admission Status: Outpatient    Attendees: Prieto Tripp MD, Lucio Carvajal MD, Dorie White MD, Boyd Clinical Specialist, Nick Baron, Monica Marcus, Gabriella SELBY and Power Childress MD    Primary presentation of AS: Heart Failure   Heart Failure:  Chronic and Diastolic    NYHA Functional Class: II    LVEF:  66 %   ANNULUS Measurement: 549.5 mm2 per CTA    Major Organ Compromise:   N/A    Procedure Specific Impediment:   N/A    Other Factors:  Major Nutritional Deficit: No  Cognitive Impairment: No  Oxygen dependent: No    STS Risk Score:  Mortality Risk: 0.6%  Mortality and Morbidity Risk: 8.8%    TAVR Rationale: Low risk middle age gentleman who is appropriate for TAVR and wishes to pursue this treatment plan instead of SAVR.  CT Surgery and Cardiology in agreement on treatment plan. TERRI vs TTE for intra operative imaging.  Dr. Rodgers had been unable to pass TERRI probe but patient reported EGD per GI in [place] noted to be \"normal\" but tortuous esophagus.         Diagnostic Studies Discussed/ Reviewed: cardiac cath, CTA TAVR    Procedure planning details:  Deployment angles RAO5, Caudal 5.  TERRI vs TTE for intra operative imaging.  Dr. Rodgers had been unable to pass TERRI probe but patient reported EGD per GI in [place] noted to be \"normal\" but tortuous esophagus.  Valve Size: 29 mm Nix Anthony 3 with reduced inflation volume by 1 ml  Cardiology sheath access: right femoral  CT Surgery sheath access: left femoral      Post Procedure Considerations: Monitor for post op jessica-arrhythmias and vascular access site bleeding.        Gabriella SELBY       "

## 2021-11-08 NOTE — ANESTHESIA PROCEDURE NOTES
Procedure Performed: Emergent/Open-Heart Anesthesia TERRI      Start Time:  11/8/2021 10:33 AM       End Time:      Preanesthesia Checklist:  Patient identified, IV assessed, risks and benefits discussed, monitors and equipment assessed, procedure being performed at surgeon's request and anesthesia consent obtained.    General Procedure Information  Diagnostic Indications for Echo:  assessment of surgical repair  Physician Requesting Echo: Prieto Tripp MD  Location performed:  OR  Intubated  Bite block placed  Heart visualized  Probe Insertion:  Easy  Probe Type:  Multiplane  Modalities:  2D only, color flow mapping, continuous wave Doppler and pulse wave Doppler    Echocardiographic and Doppler Measurements    Ventricles    Right Ventricle:  Cavity size normal.  Hypertrophy not present.  Thrombus not present.  Global function normal.    Left Ventricle:  Cavity size normal.  Hypertrophy present.  Thrombus not present.  Global Function normal.  Ejection Fraction 55%.          Valves    Aortic Valve:  Annulus calcified.  Stenosis severe.  Area: 0.79 cm².  Mean Gradient: 40/22 pk/mn mmHg.  Regurgitation mild.  Leaflets calcified and bicuspid.  Leaflet motions restricted.      Mitral Valve:  Annulus normal.  Stenosis not present.  Regurgitation trace.  Leaflets normal.  Leaflet motions normal.      Tricuspid Valve:  Annulus normal.  Stenosis not present.  Regurgitation trace.  Leaflets normal.  Leaflet motions normal.    Pulmonic Valve:  Annulus normal.  Stenosis moderate.  Regurgitation trace.          Aorta    Ascending Aorta:  Size normal.  Dissection not present.  Plaque thickness less than 3 mm.  Mobile plaque not present.    Aortic Arch:  Size normal.  Dissection not present.  Plaque thickness less than 3 mm.  Mobile plaque not present.    Descending Aorta:  Size normal.  Dissection not present.  Plaque thickness less than 3 mm.  Mobile plaque not present.          Atria    Right Atrium:  Size normal.   Spontaneous echo contrast not present.  Thrombus not present.  Tumor not present.  Device not present.      Left Atrium:  Size normal.  Spontaneous echo contrast not present.  Thrombus not present.  Tumor not present.  Device not present.    Left atrial appendage normal.      Septa    Atrial Septum:  Intra-atrial septal morphology normal.      Ventricular Septum:  Intra-ventricular septum morphology normal.          Other Findings  Pericardium:  pericardial effusion  Pleural Effusion:  none  Pulmonary Venous Flow:  blunted (decreased) systolic flow    Anesthesia Information  Performed Personally  Anesthesiologist:  Monica Marcus MD      Echocardiogram Comments:       Findings discussed with TAVR team    PreTAVR:  Normal RV and LV fxn.  EF 55-60%.  LVH, trace MR, trace TR, mild AI, AV appears bicuspid,  Severe AS, pk/mn gradient 40/22mmHg.  DENNY by continuity 0.79cm2.  Aorta grade 3/5 atheroma.    S/P TAVR 29mm valve  RV and LV fxn preserved. No paravalvular leak, trace AI, Pk/mn gradient 16/7mmHg.  DENNY by continuity Vmax 2.48cm2, by VTi 2.32cm2. no changes in MV and TV. Aorta intact, No change in pre-existing pericardial effusion.

## 2021-11-08 NOTE — ANESTHESIA PROCEDURE NOTES
Arterial Line    Pre-sedation assessment completed: 11/8/2021 10:00 AM    Patient reassessed immediately prior to procedure    Patient location during procedure: pre-op  Start time: 11/8/2021 10:00 AM   Line placed for MD/Surgeon request, ABGs/Labs/ISTAT and hemodynamic monitoring.  Performed By   CRNA: Jeramie Interiano CRNA  Preanesthetic Checklist  Completed: patient identified, IV checked, site marked, risks and benefits discussed, surgical consent, monitors and equipment checked, pre-op evaluation and timeout performed  Arterial Line Prep   Sterile Tech: cap, gloves, mask and sterile barriers  Prep: ChloraPrep  Patient monitoring: blood pressure monitoring and continuous pulse oximetry  Arterial Line Procedure   Laterality:right  Location:  radial artery  Catheter size: 20 G   Guidance: ultrasound guided  PROCEDURE NOTE/ULTRASOUND INTERPRETATION.  Using ultrasound guidance the potential vascular sites for insertion of the catheter were visualized to determine the patency of the vessel to be used for vascular access.  After selecting the appropriate site for insertion, the needle was visualized under ultrasound being inserted into the radial artery, followed by ultrasound confirmation of wire and catheter placement. There were no abnormalities seen on ultrasound; an image was taken; and the patient tolerated the procedure with no complications.   Number of attempts: 1  Successful placement: yes  Post Assessment   Dressing Type: biopatch applied, line sutured, occlusive dressing applied, secured with tape and wrist guard applied.   Complications no  Circ/Move/Sens Assessment: normal.   Patient Tolerance: patient tolerated the procedure well with no apparent complications

## 2021-11-09 VITALS
DIASTOLIC BLOOD PRESSURE: 70 MMHG | OXYGEN SATURATION: 95 % | TEMPERATURE: 97.7 F | BODY MASS INDEX: 30.84 KG/M2 | SYSTOLIC BLOOD PRESSURE: 134 MMHG | WEIGHT: 248.02 LBS | RESPIRATION RATE: 18 BRPM | HEART RATE: 72 BPM | HEIGHT: 75 IN

## 2021-11-09 PROBLEM — R06.09 EXERTIONAL DYSPNEA: Status: RESOLVED | Noted: 2021-08-18 | Resolved: 2021-11-09

## 2021-11-09 PROBLEM — R07.9 CHEST PAIN: Status: RESOLVED | Noted: 2021-08-18 | Resolved: 2021-11-09

## 2021-11-09 LAB
ACT BLD: 125 SECONDS (ref 82–152)
ACT BLD: 290 SECONDS (ref 82–152)
ANION GAP SERPL CALCULATED.3IONS-SCNC: 9 MMOL/L (ref 5–15)
BASE EXCESS BLDA CALC-SCNC: -3 MMOL/L (ref -5–5)
BUN SERPL-MCNC: 16 MG/DL (ref 8–23)
BUN/CREAT SERPL: 20.3 (ref 7–25)
CA-I BLDA-SCNC: 1.16 MMOL/L (ref 1.2–1.32)
CALCIUM SPEC-SCNC: 8.6 MG/DL (ref 8.6–10.5)
CHLORIDE SERPL-SCNC: 104 MMOL/L (ref 98–107)
CO2 BLDA-SCNC: 23 MMOL/L (ref 24–29)
CO2 SERPL-SCNC: 23 MMOL/L (ref 22–29)
CREAT SERPL-MCNC: 0.79 MG/DL (ref 0.76–1.27)
DEPRECATED RDW RBC AUTO: 44.4 FL (ref 37–54)
ERYTHROCYTE [DISTWIDTH] IN BLOOD BY AUTOMATED COUNT: 13 % (ref 12.3–15.4)
GFR SERPL CREATININE-BSD FRML MDRD: 98 ML/MIN/1.73
GLUCOSE BLDC GLUCOMTR-MCNC: 95 MG/DL (ref 70–130)
GLUCOSE SERPL-MCNC: 118 MG/DL (ref 65–99)
HCO3 BLDA-SCNC: 21.5 MMOL/L (ref 22–26)
HCT VFR BLD AUTO: 44.2 % (ref 37.5–51)
HCT VFR BLDA CALC: 43 % (ref 38–51)
HGB BLD-MCNC: 14.9 G/DL (ref 13–17.7)
HGB BLDA-MCNC: 14.6 G/DL (ref 12–17)
MAGNESIUM SERPL-MCNC: 1.9 MG/DL (ref 1.6–2.4)
MCH RBC QN AUTO: 31.6 PG (ref 26.6–33)
MCHC RBC AUTO-ENTMCNC: 33.7 G/DL (ref 31.5–35.7)
MCV RBC AUTO: 93.6 FL (ref 79–97)
PCO2 BLDA: 35 MM HG (ref 35–45)
PH BLDA: 7.4 PH UNITS (ref 7.35–7.6)
PLATELET # BLD AUTO: 157 10*3/MM3 (ref 140–450)
PMV BLD AUTO: 10.5 FL (ref 6–12)
PO2 BLDA: 89 MMHG (ref 80–105)
POTASSIUM BLDA-SCNC: 3.7 MMOL/L (ref 3.5–4.9)
POTASSIUM SERPL-SCNC: 3.9 MMOL/L (ref 3.5–5.2)
QT INTERVAL: 414 MS
QTC INTERVAL: 437 MS
RBC # BLD AUTO: 4.72 10*6/MM3 (ref 4.14–5.8)
SAO2 % BLDA: 97 % (ref 95–98)
SODIUM BLD-SCNC: 144 MMOL/L (ref 138–146)
SODIUM SERPL-SCNC: 136 MMOL/L (ref 136–145)
WBC # BLD AUTO: 12 10*3/MM3 (ref 3.4–10.8)

## 2021-11-09 PROCEDURE — 99239 HOSP IP/OBS DSCHRG MGMT >30: CPT | Performed by: NURSE PRACTITIONER

## 2021-11-09 PROCEDURE — 99231 SBSQ HOSP IP/OBS SF/LOW 25: CPT | Performed by: INTERNAL MEDICINE

## 2021-11-09 PROCEDURE — 83735 ASSAY OF MAGNESIUM: CPT | Performed by: THORACIC SURGERY (CARDIOTHORACIC VASCULAR SURGERY)

## 2021-11-09 PROCEDURE — 0 MAGNESIUM SULFATE 4 GM/100ML SOLUTION: Performed by: THORACIC SURGERY (CARDIOTHORACIC VASCULAR SURGERY)

## 2021-11-09 PROCEDURE — 99231 SBSQ HOSP IP/OBS SF/LOW 25: CPT | Performed by: THORACIC SURGERY (CARDIOTHORACIC VASCULAR SURGERY)

## 2021-11-09 PROCEDURE — 94799 UNLISTED PULMONARY SVC/PX: CPT

## 2021-11-09 PROCEDURE — 93005 ELECTROCARDIOGRAM TRACING: CPT | Performed by: PHYSICIAN ASSISTANT

## 2021-11-09 PROCEDURE — 85027 COMPLETE CBC AUTOMATED: CPT | Performed by: PHYSICIAN ASSISTANT

## 2021-11-09 PROCEDURE — 80048 BASIC METABOLIC PNL TOTAL CA: CPT | Performed by: PHYSICIAN ASSISTANT

## 2021-11-09 PROCEDURE — 99232 SBSQ HOSP IP/OBS MODERATE 35: CPT | Performed by: INTERNAL MEDICINE

## 2021-11-09 RX ORDER — MAGNESIUM SULFATE HEPTAHYDRATE 40 MG/ML
4 INJECTION, SOLUTION INTRAVENOUS AS NEEDED
Status: DISCONTINUED | OUTPATIENT
Start: 2021-11-09 | End: 2021-11-09 | Stop reason: HOSPADM

## 2021-11-09 RX ORDER — MAGNESIUM SULFATE HEPTAHYDRATE 40 MG/ML
2 INJECTION, SOLUTION INTRAVENOUS AS NEEDED
Status: DISCONTINUED | OUTPATIENT
Start: 2021-11-09 | End: 2021-11-09 | Stop reason: HOSPADM

## 2021-11-09 RX ADMIN — MAGNESIUM SULFATE HEPTAHYDRATE 4 G: 40 INJECTION, SOLUTION INTRAVENOUS at 06:40

## 2021-11-09 RX ADMIN — ASPIRIN 81 MG: 81 TABLET, COATED ORAL at 08:23

## 2021-11-09 NOTE — PLAN OF CARE
Goal Outcome Evaluation:   Pt received from the OR after TAVR surgery. Pt has progressed very well. Pt is neurologically intact. Pt is on 3L nc and lungs are clear to auscultation. Blood pressure has remained in the 110-120's systolic. Sheaths pulled at 1340 without difficulty. Bedrest up at 1940. Vital signs stable. Will continue to monitor.

## 2021-11-09 NOTE — CASE MANAGEMENT/SOCIAL WORK
Case Management Discharge Note      Final Note: Plan is home with wife. Wife will transport. Patient denies any discharge needs.         Selected Continued Care - Admitted Since 11/8/2021     Destination    No services have been selected for the patient.              Durable Medical Equipment    No services have been selected for the patient.              Dialysis/Infusion    No services have been selected for the patient.              Home Medical Care    No services have been selected for the patient.              Therapy    No services have been selected for the patient.              Community Resources    No services have been selected for the patient.              Community & DME    No services have been selected for the patient.                       Final Discharge Disposition Code: 01 - home or self-care

## 2021-11-09 NOTE — PROGRESS NOTES
"[facility] Cardiology Daily Note       LOS: 1 day   Patient Care Team:  Geovanni Greene MD as PCP - General (Internal Medicine)    Chief Complaint: Day 1 status post TAVR    Subjective     Subjective: No complaints this morning.  Wants to go home.  Wants to know how quickly he can drive go back to work and resume exercise.  Blood pressures and heart rates good overnight.  93% on room air.      Review of Systems:   As above.    Medications:  aspirin, 81 mg, Oral, Daily        Objective     Vital Sign Min/Max for last 24 hours  Temp  Min: 97.6 °F (36.4 °C)  Max: 98.6 °F (37 °C)   BP  Min: 116/79  Max: 151/105   Pulse  Min: 65  Max: 92   Resp  Min: 16  Max: 20   SpO2  Min: 91 %  Max: 98 %   Flow (L/min)  Min: 2  Max: 3   Weight  Min: 113 kg (248 lb 0.3 oz)  Max: 113 kg (248 lb 0.3 oz)      Intake/Output Summary (Last 24 hours) at 11/9/2021 0706  Last data filed at 11/9/2021 0300  Gross per 24 hour   Intake 775 ml   Output 1750 ml   Net -975 ml        Flowsheet Rows      First Filed Value   Admission Height 190.5 cm (75\") Documented at 11/08/2021 1000   Admission Weight 113 kg (248 lb 0.3 oz) Documented at 11/08/2021 1000          Physical Exam:    General: Alert and oriented.   Cardiovascular: Heart has a nondisplaced focal PMI. Regular rate and rhythm with trace systolic ejection murmur, no gallop or rub.  Lungs: Clear without rales or wheezes. Equal expansion is noted.   Abdomen: Soft, nontender.  Extremities: Show no edema. No bruising.  No bruits.  No hematoma.  Skin: warm and dry.     Results Review:    I reviewed the patient's new clinical results.  EKG:  Tele: Sinus rhythm    Labs:    Results from last 7 days   Lab Units 11/09/21  0335 11/08/21  1241 11/05/21  1313   SODIUM mmol/L 136 139 139   POTASSIUM mmol/L 3.9 3.9 4.9   CHLORIDE mmol/L 104 106 103   CO2 mmol/L 23.0 23.0 26.0   BUN mg/dL 16 13 15   CREATININE mg/dL 0.79 0.94 0.97   CALCIUM mg/dL 8.6 8.5* 9.3   BILIRUBIN mg/dL  --   --  " 0.5   ALK PHOS U/L  --   --  57   ALT (SGPT) U/L  --   --  42*   AST (SGOT) U/L  --   --  31   GLUCOSE mg/dL 118* 119* 99     Results from last 7 days   Lab Units 11/09/21  0335 11/08/21  1241 11/08/21  1027 11/05/21  1313   WBC 10*3/mm3 12.00* 9.50  --  4.88   HEMOGLOBIN g/dL 14.9 15.5  --  16.3   HEMOGLOBIN, POC g/dL  --   --  14.6  --    HEMATOCRIT % 44.2 45.7  --  48.5   HEMATOCRIT POC %  --   --  43  --    PLATELETS 10*3/mm3 157 167  --  175     Lab Results   Component Value Date    TROPONINT <0.010 09/10/2021    TROPONINT <0.010 09/10/2021     Lab Results   Component Value Date    CHOL 167 08/27/2021     Lab Results   Component Value Date    TRIG 89 08/27/2021     Lab Results   Component Value Date    HDL 39 (L) 08/27/2021     No components found for: LDLCALC  Lab Results   Component Value Date    INR 1.00 11/05/2021    PROTIME 12.9 11/05/2021         Ejection Fraction:    Assessment   Assessment:    1. Severe aortic stenosis status post 29 mm TAVR 11/8/2021  2. Normal coronary arteries by cath 8/27/2021  3. DANNY on CPAP therapy  4. May return to full activity at work in 1 week.  Of asked him to refrain from hanging upside down for his back for 1 week as well.      Plan:    Home on aspirin 81 mg daily and other usual medications.  Follow-up with me 6 weeks    Dorie White MD  11/09/21  07:06 EST

## 2021-11-09 NOTE — PROGRESS NOTES
"Ron WHITNEY [place]  [id number]  1955     LOS: 1 day   Patient Care Team:  Geovanni Greene MD as PCP - General (Internal Medicine)    Chief Complaint: Aortic stenosis      Subjective: No complaints, wants to go home    Objective:     Vital Sign Min/Max for last 24 hours  Temp  Min: 97.6 °F (36.4 °C)  Max: 98.6 °F (37 °C)   BP  Min: 116/79  Max: 151/105   Pulse  Min: 69  Max: 92   Resp  Min: 16  Max: 20   SpO2  Min: 91 %  Max: 98 %   No data recorded   Weight  Min: 113 kg (248 lb 0.3 oz)  Max: 113 kg (248 lb 0.3 oz)     Flowsheet Rows      First Filed Value   Admission Height 190.5 cm (75\") Documented at 11/08/2021 1000   Admission Weight 113 kg (248 lb 0.3 oz) Documented at 11/08/2021 1000          Physical Exam:    Wound: Satisfactory, pulses intact  Pulses:     Mediastinal and Chest Tube Drainage:       Results Review:   Results from last 7 days   Lab Units 11/09/21  0335   WBC 10*3/mm3 12.00*   HEMOGLOBIN g/dL 14.9   HEMATOCRIT % 44.2   PLATELETS 10*3/mm3 157     Results from last 7 days   Lab Units 11/09/21  0335   SODIUM mmol/L 136   POTASSIUM mmol/L 3.9   CHLORIDE mmol/L 104   CO2 mmol/L 23.0   BUN mg/dL 16   CREATININE mg/dL 0.79   GLUCOSE mg/dL 118*   CALCIUM mg/dL 8.6     Results from last 7 days   Lab Units 11/08/21  1027   PH, ARTERIAL pH units 7.40         Assessment      TAVR 11/08/21    AVD (aortic valve disease)      Aortic stenosis.  Discharge of all agree        Prieto Tripp MD  11/09/21  06:38 EST      Please note that portions of this note were completed with a voice recognition program. Efforts were made to edit the dictations, but words may be mistranscribed  "

## 2021-11-09 NOTE — CASE MANAGEMENT/SOCIAL WORK
Discharge Planning Assessment  [facility]     Patient Name: Ron Villa  MRN: [id number]  Today's Date: 11/9/2021    Admit Date: 11/8/2021     Discharge Needs Assessment     Row Name 11/09/21 0831       Living Environment    Name(s) of Who Lives With Patient Livia Villa (wife) [phone]    Current Living Arrangements home/apartment/condo    Primary Care Provided by self    Provides Primary Care For no one    Family Caregiver if Needed spouse    Family Caregiver Names Livia Villa (wife) [phone]       Resource/Environmental Concerns    Resource/Environmental Concerns none    Transportation Concerns car, none       Transition Planning    Patient/Family Anticipates Transition to home with family    Patient/Family Anticipated Services at Transition none    Transportation Anticipated family or friend will provide       Discharge Needs Assessment    Readmission Within the Last 30 Days no previous admission in last 30 days    Equipment Currently Used at Home cpap    Concerns to be Addressed denies needs/concerns at this time    Anticipated Changes Related to Illness none    Equipment Needed After Discharge none               Discharge Plan     Row Name 11/09/21 0879       Plan    Plan Home with family    Patient/Family in Agreement with Plan yes    Plan Comments Spoke with patient at bedside. Lives with Livia Villa (wife) [phone] in [place]. Is independent with ADL's. No probelsm with [facility] or medications. Uses a CPAP at home. No advanced directives. Plan is home with wife. CM will continue to follow.    Final Discharge Disposition Code 01 - home or self-care              Continued Care and Services - Admitted Since 11/8/2021    Coordination has not been started for this encounter.       Expected Discharge Date and Time     Expected Discharge Date Expected Discharge Time    Nov 9, 2021          Demographic Summary     Row Name 11/09/21 0829       General Information    Admission Type inpatient    Arrived From  PACU/recovery room    Referral Source admission list    Reason for Consult discharge planning    Preferred Language English     Used During This Interaction no       Contact Information    Permission Granted to Share Info With     Contact Information Obtained for     Contact Information Comments PCP is Geovanni Greene MD               Functional Status     Row Name 11/09/21 0829       Functional Status    Usual Activity Tolerance good    Current Activity Tolerance good       Functional Status, IADL    Medications independent    Meal Preparation independent    Housekeeping independent    Laundry independent    Shopping independent       Mental Status    General Appearance WDL WDL       Mental Status Summary    Recent Changes in Mental Status/Cognitive Functioning no changes       Employment/    Employment Status self-employed               Psychosocial    No documentation.                Abuse/Neglect    No documentation.                Legal    No documentation.                Substance Abuse    No documentation.                Patient Forms    No documentation.                   Clint Denis RN

## 2021-11-09 NOTE — DISCHARGE INSTRUCTIONS
Special Instructions:   1.  Incision care: Check groin sites daily. Clean daily with a clean washcloth, soap and water. Pat dry.  Report redness, drainage, swelling or significant tenderness to [facility] or to Gabriella SELBY at [facility].   2.  Patient may shower, but no tub baths until CT Surgery followup.   3.  Walk daily starting with 5 minutes four times daily.  Increase walking by one minute per walk as tolerated.    4.  No lifting over 10 lbs x 2 weeks.  5.  SBE education and card provided  6.  No driving x 2 weeks   7.  Weigh daily and report weight gain of 3 pounds overnight or 5 pounds in a week to [facility].   8.  Report symptoms of increased shortness of breath, chest pain, or temperature greater than 100.5 degrees to [facility] at [phone] or [facility] CT [facility] [phone].

## 2021-11-09 NOTE — NURSING NOTE
Pt. Referred for Phase II Cardiac Rehab. Staff discussed benefits of exercise, program protocol, and educational material provided. Teach back verified.  Permission granted from patient for staff to fax referral information to outlying program at this time.  Staff faxed referral info to  [facility].

## 2021-11-09 NOTE — DISCHARGE SUMMARY
Discharge Summary TAVR    Date of Admission: 11/8/2021  Date of Discharge:  11/9/2021    PCP: Geovanni Greene MD  ATTENDING: Prieto Tripp MD  Primary Cardiologist: Dorie White MD    TAVR Team  1.  Prieto Tripp MD  2.  Lucio Carvajal MD  3.  Melvin Rodgers MD  4.  Dorie White MD  5.  Power Childress MD  6.  Morris Flanagan MD    Presenting Problem/ HPI: Patient is a 66 y.o.  gentleman from [place], [place].   He was referred by primary care to cardiology due to worsening CHACKO x 1 year.  His echocardiogram was abnormal to show severe aortic stenosis.  Following his pre-op testing, it was determined he was a suitable candidate for TAVR.        Discharge Diagnosis:     1.  AVD (aortic valve disease) s/p TAVR 11/08/21    2.  DANNY compliant with CPAP    3.  Diastolic heart failure due to valvular disease    4.  GERD    Procedures Performed:   TRANSCATHETER AORTIC VALVE REPLACEMENT utilizing Nix Anthony 3 size 29 mm tissue prosthesis      Hospital Course:The patient is an 66 y.o. male from [address] [place] [address] with symptoms of severe aortic stenosis including CHACKO which began to worsen over the past 12 months.  An echocardiogram was done 7/12/21 which revealed the aortic valve indices to meet TAVR criteria, which included the aortic valve area of 0.76 sq cm, mean gradient 34 mmHg and aortic valve V-max 4.08 m/sec.   He was evaluated by Aubrey Melo, and Vishal and deemed to be low risk of mortality with traditional open AVR primarily based upon STS mortality risk score 0.63%. Mr. Villa was admitted the morning of the scheduled OR procedure on 11/8/21.       Mr. Villa was taken to the OR in fasting condition and placed under general anesthesia.  Dr. Robert White placed right femoral arterial and venous access sheaths.  Using venous access a temporary transvenous pacing letter was inserted to the right ventricular apex where excellent pacing and sensing were noted.  A 5  Luxembourger pigtail catheter was advanced from the femoral arterial access to the aortic root for intraprocedural aortography and to confirm the implantation angle.  At this time the left femoral arterial access was obtained with Dr. Tripp and Dr. Clarke.  Heparin was administered.  2 Perclose devices were deployed.  A 7 Luxembourger sheath was inserted.  The iliac and aorta were traversed using a Magic torque wire.  This was exchanged for Amplatz wire.  A 16 Luxembourger Nix sheath was inserted without difficulty.  Using his access the AL-1 with straight wire were used to cross the aortic valve.  Straight wire was exchanged for safari wire.  The Nix device was placed in the descending aorta and the sheath then advanced across the transverse arch and across the aortic valve.  Angiography confirmed excellent position.  The valve was deployed under rapid pacing protocol.  Transesophageal echocardiography revealed trace aortic insufficiency and angiography again confirmed excellent result with trivial aortic insufficiency.  The delivery device was then removed.  Sheath was removed and 2 Perclose tied down.  Good hemostasis.  The pigtail catheter was placed at L2.  Angiography was done of the iliac and femoral vessels to reveal excellent flow to the left leg.  There was no evidence of extravasation.  Protamine was given to reverse heparin.  Patient tolerated extubation in the operating room.  The right femoral temporary transvenous pacing electrode and pigtail catheter were removed but sheaths left in place to later be discontinued by the nursing staff.  Patient transported to cardiothoracic ICU in stable condition.    Initial vital signs were noted to be stable with temperature 98.6, blood pressure 139/76, pulse 87 bpm, respiration 16/min and pulse oximetry 96% with 3 L nasal cannula supplemental oxygen.  Once acceptable ACT was obtained at the remaining right femoral and venous sheaths were discontinued by the nursing staff.   "Patient completed 6 hours bedrest.  Groin sites remained stable without bleeding or hematoma formation.  Intensivist reviewed post procedure labs which included normal hemoglobin and platelets, normal electrolytes, and EKG with normal sinus rhythm.    Postop day #1: No overnight arrhythmias.  No evidence of bleeding or hematoma formation of vascular access sites.  Cardiac rhythm remained normal sinus per bedside telemetry.  Patient evaluated by CT surgery cardiology.  He ambulated steadily with the nursing staff.  He was approved for discharge home with family.  Follow-up plans include 1 week telemedicine visit with TAVR APRN, 710-day follow-up with primary care provider, 4-week CT surgery follow-up, and 6-week follow-up with cardiology to include post procedure echo.  Aspirin 81 mg daily was added to his preop home meds.        Physical Exam on date of discharge:   Vital Sign Min/Max for last 24 hours  Temp  Min: 97.6 °F (36.4 °C)  Max: 98.6 °F (37 °C)   BP  Min: 116/79  Max: 151/105   Pulse  Min: 65  Max: 92   Resp  Min: 16  Max: 20   SpO2  Min: 91 %  Max: 98 %   Flow (L/min)  Min: 2  Max: 3   Weight  Min: 113 kg (248 lb 0.3 oz)  Max: 113 kg (248 lb 0.3 oz)             Flowsheet Rows      First Filed Value   Admission Height 190.5 cm (75\") Documented at 11/08/2021 1000   Admission Weight 113 kg (248 lb 0.3 oz) Documented at 11/08/2021 1000             Physical Exam:   General: Alert and oriented. Up in chair  Cardiovascular: Regular rate and rhythm with trace systolic ejection murmur, no gallop or rub.  Lungs: Clear without rales or wheezes. Equal expansion is noted.   Abdomen: Soft, nontender. + BS auscultated  Extremities: Show no edema. No groin site ecchymosis.  No hematoma.  Skin: warm and dry.                  EKG:  Tele: Sinus rhythm    Pertinent Test Results:     Basic Metabolic Panel  Component   Ref Range & Units 03:35   (11/9/21) 1 d ago   (11/8/21) 1 d ago   (11/8/21) 1 d ago   (11/8/21) 1 d ago "   (11/8/21) 4 d ago   (11/5/21) 1 mo ago   (9/28/21)   Glucose   65 - 99 mg/dL 118 High   119 High   100 R, CM  95 R  94 R, CM  99     BUN   8 - 23 mg/dL 16  13     15     Creatinine   0.76 - 1.27 mg/dL 0.79  0.94     0.97  1.00 R, CM    Sodium   136 - 145 mmol/L 136  139     139     Potassium   3.5 - 5.2 mmol/L 3.9  3.9 CM     4.9     Chloride   98 - 107 mmol/L 104  106     103     CO2   22.0 - 29.0 mmol/L 23.0  23.0     26.0     Calcium   8.6 - 10.5 mg/dL 8.6  8.5 Low      9.3     eGFR Non African Amer   >60 mL/min/1.73 98  80     77           CBC (No Diff)  Component   Ref Range & Units 03:35   (11/9/21) 1 d ago   (11/8/21) 4 d ago   (11/5/21) 2 mo ago   (9/10/21) 2 mo ago   (8/27/21)   WBC   3.40 - 10.80 10*3/mm3 12.00 High   9.50  4.88  15.20 High   5.91    RBC   4.14 - 5.80 10*6/mm3 4.72  4.89  5.12  5.30  5.09    Hemoglobin   13.0 - 17.7 g/dL 14.9  15.5  16.3  16.8  16.5    Hematocrit   37.5 - 51.0 % 44.2  45.7  48.5  50.0  49.1    MCV   79.0 - 97.0 fL 93.6  93.5  94.7  94.3  96.5    MCH   26.6 - 33.0 pg 31.6  31.7  31.8  31.7  32.4    MCHC   31.5 - 35.7 g/dL 33.7  33.9  33.6  33.6  33.6    RDW   12.3 - 15.4 % 13.0  13.0  13.0  13.2  13.0    RDW-SD   37.0 - 54.0 fl 44.4  44.9  45.7  45.6  46.3    MPV   6.0 - 12.0 fL 10.5  10.4  10.4  10.5  10.4    Platelets   140 - 450 10*3/mm3 157  167  175  185  187             Discharge Disposition  Home or Self Care    Discharge Medications     Discharge Medications      Continue These Medications      Instructions Start Date   aspirin 81 MG EC tablet   81 mg, Oral, Daily      Centrum Silver 50+Men tablet tablet   1 tablet, Oral, Daily      fexofenadine 180 MG tablet  Commonly known as: ALLEGRA   180 mg, Oral, Daily      FISH OIL PO   1,400 mg, Oral, Daily With Breakfast      meloxicam 7.5 MG tablet  Commonly known as: MOBIC   7.5 mg, Oral, Daily      OSTEO BI-FLEX TRIPLE STRENGTH PO   1 tablet, Oral, Daily      pantoprazole 40 MG EC tablet  Commonly known as: PROTONIX    40 mg, Oral, Daily, \      vitamin B-12 1000 MCG tablet  Commonly known as: CYANOCOBALAMIN   1,000 mcg, Oral, Daily      vitamin C 250 MG tablet  Commonly known as: ASCORBIC ACID   500 mg, Oral, Daily      vitamin D3 125 MCG (5000 UT) capsule capsule   1,000 Units, Oral, Daily      Vitamin E 180 MG (400 UNIT) capsule capsule   1 capsule, Oral, Daily             Discharge Diet:   Diet Instructions     Diet: Cardiac      Discharge Diet: Cardiac          Activity at Discharge:   Activity Instructions     Bathing Restrictions      Do not submerge incisions in bathtub, swimming pools, hot tub, or any body of water. You may shower and allow water to run over incisions, then pat dry.    Type of Restriction: Bathing    Bathing Restrictions: No Tub Bath    Driving Restrictions      Type of Restriction: Driving    Driving Restrictions: No Driving Until Next Appointment    Lifting Restrictions      Type of Restriction: Lifting    Lifting Restrictions: Lifting Restriction (Indicate Limit)    Weight Limit (Pounds): 10    Length of Lifting Restriction: until next appointment          Special Instructions:   1.  Incision care: Check groin sites daily. Clean daily with a clean washcloth, soap and water. Pat dry.  Report redness, drainage, swelling or significant tenderness to [facility] CT Surgery or to Gabriella SELBY at [facility].   2.  Patient may shower, but no tub baths until CT Surgery followup.   3.  Walk daily starting with 5 minutes four times daily.  Increase walking by one minute per walk as tolerated.    4.  No lifting over 10 lbs x 2 weeks.  5.  SBE education and card provided  6.  No driving x 2 weeks   7.  Weigh daily and report weight gain of 3 pounds overnight or 5 pounds in a week to [facility].   8.  Report symptoms of increased shortness of breath, chest pain, or temperature greater than 100.5 degrees to [facility] at [phone] or [facility] CT Surgery [phone].    Follow-up Appointments  Future Appointments   Date Time Provider Department Center   11/15/2021  8:45 AM Gabriella Callahan APRN [facility]   12/8/2021  8:00 AM [facility] CRT8 [facility]  [facility]   12/8/2021  9:30 AM Dorie White MD [facility]     Additional Instructions for the Follow-ups that You Need to Schedule     Discharge Follow-up with Specialty: Cardiothoracic Surgery   As directed      Follow Up Details: Follow Up in 2-4 Weeks    Specialty: Cardiothoracic Surgery         Discharge Follow-up with Specialty: Heart & Valve Center; 1 Week   As directed      Specialty: [facility]    Follow Up: 1 Week    Follow Up Details: Follow Up With Heart & Valve Center Within 7 Days of Discharge. If Discharged on a Weekend, Schedule Follow Up For The Following Friday at 0900.         Cardiac Rehab Evaluation and Enrollment   Nov 14, 2021      Reason for Consult?: Education               Time spent for discharge: 50 minutes    Thank you for allowing the [facility]idisciplinary TAVR team to care for Mr. Rj Villa.  If you have any questions or concerns please call Gabriella SELBY at [facility] [phone].  Dr. Tripp may be reached at [phone] and Cecelia White may be reached at [phone].       BAL Rodriguez  11/09/21  16:24 EST

## 2021-11-09 NOTE — PROGRESS NOTES
INTENSIVIST   PROGRESS NOTE     Hospital:  LOS: 1 day       S     Mr. oRn Villa, 66 y.o. male is followed for: No chief complaint on file.       S/P TAVR  Perioperative medical management of comorbid conditions, including glycemic control and electrolytes disorders.    As an Intensivist, we provide an integrated approach to the ICU patient and family, medical management of comorbid conditions, including but not limited to electrolytes, glycemic control, organ dysfunction, lead interdisciplinary rounds and coordinate the care with all other services, including those from other specialists.     Interval History:  POD: 1 Day Post-Op    Doing well.  Ready to go home.     The patient's relevant past medical, surgical and social history were reviewed and updated in Epic as appropriate.       History     Last Reviewed by Adria Henderson MD on 11/8/2021 at  3:16 PM    Sections Reviewed    Medical, Family, Surgical, Tobacco, Alcohol, Drug Use, Sexual Activity,   Social Documentation      Problem list reviewed by Adria Henderson MD on 11/8/2021 at  3:16 PM  Problem list reviewed by Adria Henderson MD on 11/8/2021 at  3:17 PM  Medicines reviewed by Adria Henderson MD on 11/8/2021 at  3:17 PM  Allergies reviewed by Adria Henderson MD on 11/8/2021 at  3:16 PM         O     Vitals:  Temp: 97.7 °F (36.5 °C) (11/09/21 0800) Temp  Min: 97.6 °F (36.4 °C)  Max: 98.6 °F (37 °C)   BP: 134/70 (11/09/21 0900) BP  Min: 116/79  Max: 149/93   Pulse: 72 (11/09/21 1000) Pulse  Min: 65  Max: 92   Resp: 18 (11/09/21 0800) Resp  Min: 16  Max: 20   SpO2: 95 % (11/09/21 1000) SpO2  Min: 91 %  Max: 98 %   Device: room air (11/09/21 1000)    Flow Rate: 2 (11/09/21 0400) Flow (L/min)  Min: 2  Max: 3     Intake/Ouptut 24 hrs (7:00AM - 6:59 AM)  Intake & Output (last 3 days)       11/06 0701 11/07 0700 11/07 0701 11/08 0700 11/08 0701 11/09 0700 11/09 0701  11/10 0700    P.O.    360    I.V. (mL/kg)   775 (6.9)     Total Intake(mL/kg)   775  (6.9) 360 (3.2)    Urine (mL/kg/hr)   1750 400 (1)    Total Output   1750 400    Net   -975 -40                  Medications (drips):  lactated ringers, Last Rate: 9 mL/hr (11/08/21 1013)  niCARdipine, Last Rate: 10 mg/hr (11/08/21 1300)        Physical Examination  Telemetry:  Rhythm: normal sinus rhythm (11/09/21 1000)         Constitutional:  No distress.   Cardiovascular: RRR.   Normal heart sounds.  No murmurs, gallop or rub.   Respiratory: Normal breath sounds  No adventitious sounds.   Abdominal:  Soft with no tenderness.  No distension.   No HSM.   Extremities: Warm.  Dry.  No cyanosis.  No edema.   Neurological:   Alert.  Best Eye Response: 4-->(E4) spontaneous (11/09/21 1000)  Best Motor Response: 6-->(M6) obeys commands (11/09/21 1000)  Best Verbal Response: 5-->(V5) oriented (11/09/21 1000)  Umer Coma Scale Score: 15 (11/09/21 1000)     Hematology:  Results from last 7 days   Lab Units 11/09/21  0335 11/08/21  1241 11/08/21  1027 11/05/21  1313   WBC 10*3/mm3 12.00* 9.50  --  4.88   HEMOGLOBIN g/dL 14.9 15.5  --  16.3   HEMOGLOBIN, POC   --   --    < >  --    MCV fL 93.6 93.5  --  94.7   PLATELETS 10*3/mm3 157 167  --  175   NEUTROPHIL % %  --   --   --  46.3   LYMPHOCYTE % %  --   --   --  38.1   NEUTROS ABS 10*3/mm3  --   --   --  2.26   LYMPHS ABS 10*3/mm3  --   --   --  1.86   EOS ABS 10*3/mm3  --   --   --  0.19   IMMATURE GRANS (ABS) 10*3/mm3  --   --   --  0.02    < > = values in this interval not displayed.     Chemistry:  Estimated Creatinine Clearance: 121.5 mL/min (by C-G formula based on SCr of 0.79 mg/dL).  Results from last 7 days   Lab Units 11/09/21  0335 11/08/21  1241   SODIUM mmol/L 136 139   POTASSIUM mmol/L 3.9 3.9   CHLORIDE mmol/L 104 106   CO2 mmol/L 23.0 23.0   BUN mg/dL 16 13   CREATININE mg/dL 0.79 0.94   GLUCOSE mg/dL 118* 119*     Results from last 7 days   Lab Units 11/09/21  0335 11/08/21  1241 11/05/21  1313 11/05/21  1313   CALCIUM mg/dL 8.6 8.5*   < > 9.3   MAGNESIUM  mg/dL 1.9  --   --  2.3    < > = values in this interval not displayed.     COVID-19  Lab Results   Component Value Date    COVID19 Not Detected 11/05/2021    COVID19 Not Detected 09/10/2021    COVID19 Not Detected 09/07/2021    COVID19 Not Detected 08/24/2021     Images:  No radiology results for the last day      Results: Reviewed.  I reviewed the patient's new laboratory and imaging results.  I independently reviewed the patient's new images.    Medications: Reviewed.    Assessment/Plan   A / P     Assessment:    66 y.o.male, admitted on 11/8/2021 with Aortic stenosis, severe [I35.0]  AVD (aortic valve disease) [I35.9]:     Chest pain and dypspnea for approximately 1 year which has progressively worsened.    A stress echo was obtained on 7/12/2021 noting severe AS and he was referred to cardiology who recommended TAVR work-up.    On 8/27/2021 he underwent LHC which found normal coronary arteries, and a severely calcified aortic valve.      1. Aortic Stenosis, severe. .  Procedure(s) (LRB):  TRANSCATHETER AORTIC VALVE REPLACEMENT (N/A)  TRANSESOPHAGEAL ECHOCARDIOGRAM WITH ANESTHESIA (N/A)  Transcatheter Aortic Valve Replacement (N/A)   Dr. Prieto Tripp (Cardiothoracic Surgery)   11/08/21     2. Surgical antimicrobial prophylaxis: Cefuroxime  3. DANNY  4. Former smoker.  5. FERD  6. No h/o DM    Results from last 7 days   Lab Units 11/08/21  1140 11/08/21  1027 11/08/21  0952   GLUCOSE mg/dL 100 95 94     Lab Results   Lab Value Date/Time    HGBA1C 5.70 (H) 11/05/2021 1313    HGBA1C 5.50 08/27/2021 0927       Diet: Diet Regular; Consistent Carbohydrate, Cardiac   Advance Directives: Code Status and Medical Interventions:   Ordered at: 11/08/21 1150     Code Status (Patient has no pulse and is not breathing):    CPR (Attempt to Resuscitate)     Medical Interventions (Patient has pulse or is breathing):    Full Support        Plan:    1. Hb stable. K and Mg within normal range.  2. Discharge home today as per CT  surgery.    Plan of care and goals reviewed during interdisciplinary rounds.  I discussed the patient's findings and my recommendations with patient and nursing staff

## 2021-11-12 LAB — LV EF 2D ECHO EST: 60 %

## 2021-11-15 ENCOUNTER — OFFICE VISIT (OUTPATIENT)
Dept: CARDIOLOGY | Facility: HOSPITAL | Age: 66
End: 2021-11-15

## 2021-11-15 VITALS — HEIGHT: 75 IN | TEMPERATURE: 100 F | WEIGHT: 243 LBS | BODY MASS INDEX: 30.21 KG/M2

## 2021-11-15 DIAGNOSIS — I35.0 AORTIC STENOSIS, SEVERE: ICD-10-CM

## 2021-11-15 DIAGNOSIS — I35.9 AVD (AORTIC VALVE DISEASE): Primary | ICD-10-CM

## 2021-11-15 PROCEDURE — 99212 OFFICE O/P EST SF 10 MIN: CPT | Performed by: NURSE PRACTITIONER

## 2021-11-15 NOTE — PROGRESS NOTES
"Mode of Visit: Video  Location of patient: home  You have chosen to receive care through a telehealth visit.  Does the patient consent to use a video/audio connection for your medical care today? Yes  The visit included audio and video interaction. No technical issues occurred during this visit.     Chief Complaint  Hospital Follow Up Visit (s/p TAVR on 11/8/21)    Subjective          Ron Villa presents to [facility] CARDIOLOGY  Mr. Villa is s/p TAVR on 11/8/21.  He had no procedural complications and was approved for DC home next day.  He reports feeling good.  He has been [place] jobsite with his son and denies feeling dyspnea, chest pain, or dizziness.  He also states groin sites are healing well.      Patient reports temperature readings  degrees but not over 100.5 degrees.  No VS available for review today.    Objective   Vital Signs:   Temp 100 °F (37.8 °C)   Ht 190.5 cm (75\")   Wt 110 kg (243 lb)   BMI 30.37 kg/m²     Physical Exam   Constitutional: He appears well-developed and well-nourished.   HENT:   Head: Normocephalic and atraumatic.   Pulmonary/Chest: Effort normal.   Neurological: He is alert.   Psychiatric: He has a normal mood and affect.     Result Review :     Common labs    Common Labsle 11/5/21 11/5/21 11/5/21 11/8/21 11/8/21 11/8/21 11/9/21 11/9/21    1313 1313 1313 1027 1241 1241 0335 0335   Glucose   99   119 (A)  118 (A)   BUN   15   13  16   Creatinine   0.97   0.94  0.79   eGFR Non  Am   77   80  98   Sodium   139   139  136   Potassium   4.9   3.9  3.9   Chloride   103   106  104   Calcium   9.3   8.5 (A)  8.6   Albumin   4.50        Total Bilirubin   0.5        Alkaline Phosphatase   57        AST (SGOT)   31        ALT (SGPT)   42 (A)        WBC 4.88    9.50  12.00 (A)    Hemoglobin 16.3   14.6 15.5  14.9    Hematocrit 48.5   43 45.7  44.2    Platelets 175    167  157    Hemoglobin A1C  5.70 (A)         (A) Abnormal value       Comments are " available for some flowsheets but are not being displayed.           Recent hospitalization notes : Discharge Summary by Gabriella Callahan APRN (11/09/2021 09:04)         Assessment and Plan    Diagnoses and all orders for this visit:    1. AVD s/p TAVR 11/8/21  - recovering well at home  - increase use of incentive spirometer and continue walking  - asprin 81 mg daily  - following up with CT Surgery 12/6/21 to include TAVR clinic walk test and [facility]  - follow up with Cardiology 12/8/21 to include post op echocardiogram same day    2. Diastolic heart failure r/t valvular disease  - symptomatically improved thus far  - echo/[facility] follow up 12/8/21      I spent 15 minutes caring for Edward on this date of service. This time includes time spent by me in the following activities:preparing for the visit, performing a medically appropriate examination and/or evaluation , counseling and educating the patient/family/caregiver and documenting information in the medical record  Follow Up   Return in about 1 year (around 11/15/2022) for TAVR one year.  Patient was given instructions and counseling regarding his condition or for health maintenance advice. Please see specific information pulled into the AVS if appropriate.

## 2021-12-06 ENCOUNTER — OFFICE VISIT (OUTPATIENT)
Dept: CARDIAC SURGERY | Facility: CLINIC | Age: 66
End: 2021-12-06

## 2021-12-06 ENCOUNTER — CLINICAL SUPPORT (OUTPATIENT)
Dept: CARDIOLOGY | Facility: HOSPITAL | Age: 66
End: 2021-12-06

## 2021-12-06 VITALS
OXYGEN SATURATION: 98 % | DIASTOLIC BLOOD PRESSURE: 85 MMHG | TEMPERATURE: 97.3 F | HEIGHT: 75 IN | BODY MASS INDEX: 31.33 KG/M2 | SYSTOLIC BLOOD PRESSURE: 153 MMHG | HEART RATE: 67 BPM | WEIGHT: 252 LBS

## 2021-12-06 DIAGNOSIS — Z95.2 S/P TAVR (TRANSCATHETER AORTIC VALVE REPLACEMENT): Primary | ICD-10-CM

## 2021-12-06 PROCEDURE — 99213 OFFICE O/P EST LOW 20 MIN: CPT | Performed by: NURSE PRACTITIONER

## 2021-12-06 PROCEDURE — 71046 X-RAY EXAM CHEST 2 VIEWS: CPT | Performed by: NURSE PRACTITIONER

## 2021-12-06 NOTE — PROGRESS NOTES
[facility] Cardiothoracic Surgery Office Follow Up Note     Date of Encounter: 12/06/2021     YOB: 1955  Name: Ron Villa    PCP: Geovanni Greene MD    Chief Complaint:    Chief Complaint   Patient presents with   • Post-op     Hospital follow up s/p TAVR 11/8/21.   • Aortic Stenosis       History of Present Illness:    Ron Villa is a 66 y.o. male with a history of GERD and severe aortic stenosis s/p TAVR on 11/8/2021 with Dr. Tripp.  Patient presents today in post procedure follow-up.  Since surgery, patient has had improvement in his dyspnea on exertion.  He does report some occasional chest pain worsened with deep breaths.  He is working daily as a  without difficulties.  Preprocedure left heart cath with near normal coronary arteries in August 2021.  Plans to follow-up with Dr. White on Wednesday with repeat TTE.    Review of Systems:  Review of Systems   Constitutional: Positive for fever. Negative for chills, decreased appetite and malaise/fatigue.   Cardiovascular: Positive for chest pain. Negative for claudication, dyspnea on exertion, irregular heartbeat, leg swelling, near-syncope, orthopnea, palpitations and syncope.   Respiratory: Negative.  Negative for cough, hemoptysis, shortness of breath, sputum production and wheezing.    Hematologic/Lymphatic: Negative.  Negative for bleeding problem. Does not bruise/bleed easily.   Skin: Negative.  Negative for color change, poor wound healing and rash.   Musculoskeletal: Positive for back pain and joint pain. Negative for falls.   Gastrointestinal: Negative.  Negative for abdominal pain, constipation, diarrhea, nausea and vomiting.   Neurological: Negative.  Negative for focal weakness, numbness and paresthesias.   Psychiatric/Behavioral: Negative.  Negative for depression. The patient does not have insomnia.        Allergies:  No Known Allergies    Medications:      Current Outpatient Medications:   •   aspirin 81 MG EC tablet, Take 81 mg by mouth Daily., Disp: , Rfl:   •  fexofenadine (ALLEGRA) 180 MG tablet, Take 180 mg by mouth Daily., Disp: , Rfl:   •  meloxicam (MOBIC) 7.5 MG tablet, Take 7.5 mg by mouth Daily., Disp: , Rfl:   •  Misc Natural Products (OSTEO BI-FLEX TRIPLE STRENGTH PO), Take 1 tablet by mouth Daily., Disp: , Rfl:   •  multivitamin with minerals (Centrum Silver 50+Men) tablet tablet, Take 1 tablet by mouth Daily., Disp: , Rfl:   •  Omega-3 Fatty Acids (FISH OIL PO), Take 1,400 mg by mouth Daily With Breakfast., Disp: , Rfl:   •  pantoprazole (PROTONIX) 40 MG EC tablet, Take 40 mg by mouth Daily. \, Disp: , Rfl:   •  vitamin B-12 (CYANOCOBALAMIN) 1000 MCG tablet, Take 1,000 mcg by mouth Daily., Disp: , Rfl:   •  vitamin C (ASCORBIC ACID) 250 MG tablet, Take 500 mg by mouth Daily., Disp: , Rfl:   •  vitamin D3 125 MCG (5000 UT) capsule capsule, Take 1,000 Units by mouth Daily., Disp: , Rfl:   •  Vitamin E 180 MG (400 UNIT) capsule capsule, Take 1 capsule by mouth Daily., Disp: , Rfl:     Social History     Socioeconomic History   • Marital status:      Spouse name: Livia   • Years of education: HS   Tobacco Use   • Smoking status: Former Smoker     Packs/day: 1.00     Years: 6.00     Pack years: 6.00     Types: Cigarettes     Quit date:      Years since quittin.9   • Smokeless tobacco: Current User     Types: Chew   Vaping Use   • Vaping Use: Never used   Substance and Sexual Activity   • Alcohol use: Yes     Alcohol/week: 2.0 standard drinks     Types: 2 Cans of beer per week     Comment: daily   • Drug use: Never   • Sexual activity: Defer       Family History   Problem Relation Age of Onset   • Heart disease Mother    • Heart attack Father        Past Medical History:   Diagnosis Date   • Aortic stenosis, severe 2021   • Arthritis    • Cancer (HCC)    • GERD (gastroesophageal reflux disease)    • Sleep apnea    • Wears glasses        Past Surgical History:   Procedure  "Laterality Date   • AORTIC VALVE REPAIR/REPLACEMENT N/A 11/8/2021    Procedure: TRANSCATHETER AORTIC VALVE REPLACEMENT;  Surgeon: Prieto Tripp MD;  Location: [facility];  Service: Cardiothoracic;  Laterality: N/A;  flouro-6 mins 18 sec  dose- 370mgy  contrast-85 ml isovue 370     • AORTIC VALVE REPAIR/REPLACEMENT N/A 11/8/2021    Procedure: Transcatheter Aortic Valve Replacement;  Surgeon: Dorie White MD;  Location: [facility];  Service: Cardiovascular;  Laterality: N/A;   • CARDIAC CATHETERIZATION     • CARDIAC CATHETERIZATION Left 8/27/2021    Procedure: Right and Left Heart Cath--C-19 ORDER FAXED TO  [facility];  Surgeon: Power Burgos MD;  Location: [facility] CATH INVASIVE LOCATION;  Service: Cardiology;  Laterality: Left;  Week of 8/23   • COLONOSCOPY     • HERNIA REPAIR     • TRANSESOPHAGEAL ECHOCARDIOGRAM (TERRI) N/A 11/8/2021    Procedure: TRANSESOPHAGEAL ECHOCARDIOGRAM WITH ANESTHESIA;  Surgeon: Prieto Tripp MD;  Location: [facility];  Service: Cardiothoracic;  Laterality: N/A;       I have reviewed the following portions of the patient's history: allergies, current medications, past family history, past medical history, past social history, past surgical history and problem list and confirm it's accurate.    Physical Exam:  Vital Signs:    Vitals:    12/06/21 1136   BP: 153/85   BP Location: Right arm   Patient Position: Sitting   Pulse: 67   Temp: 97.3 °F (36.3 °C)   SpO2: 98%   Weight: 114 kg (252 lb)   Height: 190.5 cm (75\")     Body mass index is 31.5 kg/m².     Physical Exam  Vitals and nursing note reviewed.   Constitutional:       Appearance: Normal appearance. He is well-developed and well-groomed.   HENT:      Head: Normocephalic and atraumatic.   Cardiovascular:      Rate and Rhythm: Normal rate and regular rhythm.      Heart sounds: Normal heart sounds, S1 normal and S2 normal. No murmur heard.  No friction rub.   Pulmonary:      Comments: Unlabored, " Clear to auscultation bilaterally  Abdominal:      General: Bowel sounds are normal.      Palpations: Abdomen is soft.      Tenderness: There is no abdominal tenderness.   Musculoskeletal:      Cervical back: Neck supple.      Right lower leg: No edema.      Left lower leg: No edema.   Skin:     General: Skin is warm and dry.      Comments: Bilateral groin puncture sites Intact  No surrounding erythema, hematoma or induration   Neurological:      Mental Status: He is alert and oriented to person, place, and time.   Psychiatric:         Attention and Perception: Attention normal.         Mood and Affect: Mood normal.         Speech: Speech normal.         Behavior: Behavior is cooperative.         Labs/Imaging:    XR Chest 2 View    Result Date: 12/6/2021  Status post valve replacement with stable appearance of the cardiomediastinal silhouette. No overt edema or effusion       Chest X-Ray PA & Lateral    Result Date: 11/5/2021  No evidence of active chest disease.  D: 11/05/2021 E: 11/05/2021    This report was finalized on 11/5/2021 9:39 PM by Dr. José Miguel Lewis MD.         Results for orders placed during the hospital encounter of 12/09/16    Duplex Venous Lower Extremity - Left CAR    Narrative  DUPLEX VENOUS LOWER EXTREMITY LEFT CAR-    CLINICAL INDICATION: LEFT LEG PAIN/EDEMA; M79.605-Pain In Left Leg      COMPARISON: None available    TECHNIQUE: Color Doppler imaging was used with compression and  augmentation to evaluate the left lower extremity deep venous system.    FINDINGS:  There is patent spontaneous flow from the common femoral vein through  the posterior tibial veins.  There was no internal clot or area of noncompressibility in the left  lower extremity.  Normal augmentation was elicited where applicable.    Impression  No DVT in the left lower extremity on today's exam.    This report was finalized on 12/9/2016 4:21 PM by Dr. Artis Edge MD.    Assessment / Plan:  Diagnoses and all orders for this  "visit:    1. S/P TAVR (transcatheter aortic valve replacement) (Primary)     Ron Villa is a 66 y.o. male with a history of GERD and severe aortic stenosis s/p TAVR on 11/8/2021 with Dr. Tripp.  Patient is stable from a post procedure standpoint with stable chest x-ray and bilateral groins.  Patient with occasional \"chest pain\" with deep breaths.  Preoperative left heart cath with near normal coronaries.  He is working daily as a  without difficulties.  Improvement in his dyspnea on exertion.  Plans to follow-up with Dr. White on Wednesday with repeat TTE.  At this point, we will plan to follow-up with patient on as-needed basis.    BAL Nunez  [facility] Cardiothoracic Surgery    "

## 2021-12-06 NOTE — PROGRESS NOTES
Five Meter Walk Test    Ron Villa  1955  [id number]  12/06/21      Utilized Walking Aid? No     Walk 1: 6.19 s/5m     Walk 2: 7.65 s/5m     Walk 3: 6.41 s/5m    Five Meter Walk Average: 6.75 s/5m    Gait Speed: Slow (Average > 6 s/5m)    Notes:       Nicolasa Finn CMA, 12/06/21              KCCQ12 score is 58/70 which correlates with NYHA class I symptoms.  Follow up in one year with Cardiology visit/ TAVR BAL and Echo.  (see scanned data sheets)    Gabriella SELBY

## 2021-12-07 NOTE — PROGRESS NOTES
"[facility] Cardiology    Patient ID: Ron Villa is a 66 y.o. male.  : 1955   Contact: [phone]    Encounter date: 2021    PCP: Geovanni Greene MD      Chief complaint:   Chief Complaint   Patient presents with   • TAVR     1mo FU   • AVD       Problem List:  1. Aortic stenosis  a. \"Heart murmur\" x 20 years   b. Symptoms of worsening chest pain and shortness of breath summer 2021  c. Stress MPS 2021: normal LVEF, small-sized mild degree of ischemia located in inferior wall; \"low-risk study\"  d. Echo 2021: EF 66-70%. Moderate AV calcification. Severe AS (peak velocity 408cm/sec, mean gradient 34mmHg, peak gradient 67 mmHg). Mild MR.  e. R/LHC, 2021: Normal LVSF and coronary arteries. Severe aortic stenosis.  f. TERRI, 2021: Pre-TAVR. EF 55-60%. LVH. Trace MR and TR. Mild AI and AV appears bicuspid. Severe AS, pk/mn gradient 40/22mmHg. DENNY by continuity 0.79cm2. Aorta grade 3/5 atheroma.   g. TAVR/TERRI, 2021 (Dr. White): EF 60%. Placement  of a 29 mm Nix ANTHONY 3 pericardial prosthesis: small paravalvular leak is seen beneath the right coronary leaflet. Mean gradient 7 mmHg. Calculated valve area 2.4 cm². <1cm pericardial effusion, unchanged following procedure.   2. Echocardiogram, 2021: AV mean gradient 16 mmHg.  Normal LVEF.  (Mean gradient was 7 mm at the time of implantation.)    3. Osteoarthritis   4. DANNY on CPAP     No Known Allergies    Current Medications:    Current Outpatient Medications:   •  aspirin 81 MG EC tablet, Take 81 mg by mouth Daily., Disp: , Rfl:   •  fexofenadine (ALLEGRA) 180 MG tablet, Take 180 mg by mouth Daily., Disp: , Rfl:   •  meloxicam (MOBIC) 7.5 MG tablet, Take 7.5 mg by mouth Daily., Disp: , Rfl:   •  Misc Natural Products (OSTEO BI-FLEX TRIPLE STRENGTH PO), Take 1 tablet by mouth Daily., Disp: , Rfl:   •  multivitamin with minerals (Centrum Silver 50+Men) tablet tablet, Take 1 tablet by mouth " "Daily., Disp: , Rfl:   •  Omega-3 Fatty Acids (FISH OIL PO), Take 1,400 mg by mouth Daily With Breakfast., Disp: , Rfl:   •  pantoprazole (PROTONIX) 40 MG EC tablet, Take 40 mg by mouth Daily. \, Disp: , Rfl:   •  sildenafil (VIAGRA) 100 MG tablet, Take  by mouth., Disp: , Rfl:   •  vitamin B-12 (CYANOCOBALAMIN) 1000 MCG tablet, Take 1,000 mcg by mouth Daily., Disp: , Rfl:   •  vitamin C (ASCORBIC ACID) 250 MG tablet, Take 500 mg by mouth Daily., Disp: , Rfl:   •  vitamin D3 125 MCG (5000 UT) capsule capsule, Take 1,000 Units by mouth Daily., Disp: , Rfl:   •  Vitamin E 180 MG (400 UNIT) capsule capsule, Take 1 capsule by mouth Daily., Disp: , Rfl:     HPI    Ron Villa is a 66 y.o. male who presents today for a one month follow up s/p TAVR placement (11/8) for severe aortic stenosis and cardiac risk factors. Since last visit, patient has been doing well overall and breathing has improved since aortic valve replacement. He reports \"feeling his valve and has a sensation radiating down left arm\" when he coughs or moves his arm a certain way. One morning he woke up with chest soreness after he put in a septic tank carrying 7-8 lb pipes the day before. He's eager to resume regular physical activity including horseback riding. He does not check his blood pressure at home. Patient denies chest pain, palpitations, edema, dizziness, and syncope.     The following portions of the patient's history were reviewed and updated as appropriate: allergies, current medications and problem list.    Pertinent positives as listed in the HPI.  All other systems reviewed are negative.         Vitals:    12/08/21 0914   BP: 152/72   BP Location: Left arm   Patient Position: Sitting   Pulse: 52   SpO2: 93%   Weight: 114 kg (252 lb)   Height: 190.5 cm (75\")       Physical Exam:  General: Alert and oriented.  Neck: Jugular venous pressure is within normal limits. Carotids have normal upstrokes without bruits.   Cardiovascular: Heart has a " nondisplaced focal PMI. Regular rate and rhythm. Trace CHIP  Lungs: Clear, no rales or wheezes. Equal expansion is noted.   Extremities: Show no edema.  Skin: Warm and dry.  Neurologic: Nonfocal.     Diagnostic Data (reviewed with patient):  Lab Results   Component Value Date    GLUCOSE 118 (H) 11/09/2021    BUN 16 11/09/2021    CREATININE 0.79 11/09/2021    EGFRIFNONA 98 11/09/2021    BCR 20.3 11/09/2021     11/09/2021    K 3.9 11/09/2021     11/09/2021    CO2 23.0 11/09/2021    CALCIUM 8.6 11/09/2021    ALBUMIN 4.50 11/05/2021    ALKPHOS 57 11/05/2021    AST 31 11/05/2021    ALT 42 (H) 11/05/2021     Lab Results   Component Value Date    CHOL 167 08/27/2021    TRIG 89 08/27/2021    HDL 39 (L) 08/27/2021     (H) 08/27/2021      Lab Results   Component Value Date    WBC 12.00 (H) 11/09/2021    RBC 4.72 11/09/2021    HGB 14.9 11/09/2021    HCT 44.2 11/09/2021    MCV 93.6 11/09/2021     11/09/2021        Procedures      Assessment:    ICD-10-CM ICD-9-CM   1. AVD (aortic valve disease)  I35.9 424.1   2. TAVR 11/08/21  I35.0 424.1         Plan:  1. Limited echocardiogram in one month to re-evaluate aortic valve gradients. (Post op mean 7 mmHg, today mean 16 mmHg)  2. Begin monitoring blood pressure at home.   3. Patient may resume normal physical activity.   4. Continue on aspirin 81 mg for antiplatelet therapy.   5. Continue all other current medications.  6. F/up in 3 months, sooner if needed.      Scribed for Dorie White MD by Luias Bernabe. 12/8/2021 09:29 EST    I Dorie White MD personally performed the services described in this documentation as scribed by the above individual in my presence, and it is both accurate and complete.    Dorie White MD, FACC

## 2021-12-08 ENCOUNTER — OFFICE VISIT (OUTPATIENT)
Dept: CARDIOLOGY | Facility: CLINIC | Age: 66
End: 2021-12-08

## 2021-12-08 ENCOUNTER — HOSPITAL ENCOUNTER (OUTPATIENT)
Dept: CARDIOLOGY | Facility: HOSPITAL | Age: 66
Discharge: HOME | End: 2021-12-08
Admitting: NURSE PRACTITIONER

## 2021-12-08 VITALS — HEIGHT: 75 IN | BODY MASS INDEX: 31.33 KG/M2 | WEIGHT: 252 LBS

## 2021-12-08 VITALS
WEIGHT: 252 LBS | OXYGEN SATURATION: 93 % | SYSTOLIC BLOOD PRESSURE: 152 MMHG | HEART RATE: 52 BPM | DIASTOLIC BLOOD PRESSURE: 72 MMHG | BODY MASS INDEX: 31.33 KG/M2 | HEIGHT: 75 IN

## 2021-12-08 DIAGNOSIS — I35.9 AVD (AORTIC VALVE DISEASE): ICD-10-CM

## 2021-12-08 DIAGNOSIS — I35.0 AORTIC STENOSIS, SEVERE: ICD-10-CM

## 2021-12-08 DIAGNOSIS — I35.9 AVD (AORTIC VALVE DISEASE): Primary | ICD-10-CM

## 2021-12-08 LAB
ASCENDING AORTA: 2.4 CM
BH CV ECHO MEAS - AO MAX PG (FULL): 27.9 MMHG
BH CV ECHO MEAS - AO MAX PG: 35 MMHG
BH CV ECHO MEAS - AO MEAN PG (FULL): 14 MMHG
BH CV ECHO MEAS - AO MEAN PG: 16 MMHG
BH CV ECHO MEAS - AO ROOT AREA (BSA CORRECTED): 1
BH CV ECHO MEAS - AO ROOT AREA: 4.9 CM^2
BH CV ECHO MEAS - AO ROOT DIAM: 2.5 CM
BH CV ECHO MEAS - AO V2 MAX: 294 CM/SEC
BH CV ECHO MEAS - AO V2 MEAN: 188 CM/SEC
BH CV ECHO MEAS - AO V2 VTI: 50.8 CM
BH CV ECHO MEAS - ASC AORTA: 2.4 CM
BH CV ECHO MEAS - AVA(I,A): 1.4 CM^2
BH CV ECHO MEAS - AVA(I,D): 1.4 CM^2
BH CV ECHO MEAS - AVA(V,A): 1.3 CM^2
BH CV ECHO MEAS - AVA(V,D): 1.3 CM^2
BH CV ECHO MEAS - BSA(HAYCOCK): 2.5 M^2
BH CV ECHO MEAS - BSA: 2.4 M^2
BH CV ECHO MEAS - BZI_BMI: 31.5 KILOGRAMS/M^2
BH CV ECHO MEAS - BZI_METRIC_HEIGHT: 190.5 CM
BH CV ECHO MEAS - BZI_METRIC_WEIGHT: 114.3 KG
BH CV ECHO MEAS - EDV(CUBED): 147.2 ML
BH CV ECHO MEAS - EDV(MOD-SP2): 64 ML
BH CV ECHO MEAS - EDV(MOD-SP4): 68 ML
BH CV ECHO MEAS - EDV(TEICH): 134.2 ML
BH CV ECHO MEAS - EF(CUBED): 77.5 %
BH CV ECHO MEAS - EF(MOD-BP): 64 %
BH CV ECHO MEAS - EF(MOD-SP2): 60.9 %
BH CV ECHO MEAS - EF(MOD-SP4): 69.1 %
BH CV ECHO MEAS - EF(TEICH): 69.2 %
BH CV ECHO MEAS - ESV(CUBED): 33.1 ML
BH CV ECHO MEAS - ESV(MOD-SP2): 25 ML
BH CV ECHO MEAS - ESV(MOD-SP4): 21 ML
BH CV ECHO MEAS - ESV(TEICH): 41.3 ML
BH CV ECHO MEAS - FS: 39.2 %
BH CV ECHO MEAS - IVS/LVPW: 1
BH CV ECHO MEAS - IVSD: 1 CM
BH CV ECHO MEAS - LA DIMENSION: 2.6 CM
BH CV ECHO MEAS - LA/AO: 1
BH CV ECHO MEAS - LAD MAJOR: 5.8 CM
BH CV ECHO MEAS - LAT PEAK E' VEL: 9.1 CM/SEC
BH CV ECHO MEAS - LATERAL E/E' RATIO: 9.1
BH CV ECHO MEAS - LV DIASTOLIC VOL/BSA (35-75): 28.1 ML/M^2
BH CV ECHO MEAS - LV IVRT: 0.11 SEC
BH CV ECHO MEAS - LV MASS(C)D: 203.1 GRAMS
BH CV ECHO MEAS - LV MASS(C)DI: 83.9 GRAMS/M^2
BH CV ECHO MEAS - LV MAX PG: 6.1 MMHG
BH CV ECHO MEAS - LV MEAN PG: 3 MMHG
BH CV ECHO MEAS - LV SYSTOLIC VOL/BSA (12-30): 8.7 ML/M^2
BH CV ECHO MEAS - LV V1 MAX: 123 CM/SEC
BH CV ECHO MEAS - LV V1 MEAN: 78.5 CM/SEC
BH CV ECHO MEAS - LV V1 VTI: 24.5 CM
BH CV ECHO MEAS - LVIDD: 5.3 CM
BH CV ECHO MEAS - LVIDS: 3.2 CM
BH CV ECHO MEAS - LVLD AP2: 8 CM
BH CV ECHO MEAS - LVLD AP4: 8 CM
BH CV ECHO MEAS - LVLS AP2: 6.9 CM
BH CV ECHO MEAS - LVLS AP4: 6.4 CM
BH CV ECHO MEAS - LVOT AREA (M): 3.1 CM^2
BH CV ECHO MEAS - LVOT AREA: 3.1 CM^2
BH CV ECHO MEAS - LVOT DIAM: 2 CM
BH CV ECHO MEAS - LVPWD: 1 CM
BH CV ECHO MEAS - MED PEAK E' VEL: 8.2 CM/SEC
BH CV ECHO MEAS - MEDIAL E/E' RATIO: 10
BH CV ECHO MEAS - MV A MAX VEL: 94.3 CM/SEC
BH CV ECHO MEAS - MV DEC SLOPE: 171 CM/SEC^2
BH CV ECHO MEAS - MV DEC TIME: 0.28 SEC
BH CV ECHO MEAS - MV E MAX VEL: 82.4 CM/SEC
BH CV ECHO MEAS - MV E/A: 0.87
BH CV ECHO MEAS - MV MAX PG: 3.5 MMHG
BH CV ECHO MEAS - MV MEAN PG: 2 MMHG
BH CV ECHO MEAS - MV P1/2T MAX VEL: 76 CM/SEC
BH CV ECHO MEAS - MV P1/2T: 130.2 MSEC
BH CV ECHO MEAS - MV V2 MAX: 93 CM/SEC
BH CV ECHO MEAS - MV V2 MEAN: 60.9 CM/SEC
BH CV ECHO MEAS - MV V2 VTI: 27.1 CM
BH CV ECHO MEAS - MVA P1/2T LCG: 2.9 CM^2
BH CV ECHO MEAS - MVA(P1/2T): 1.7 CM^2
BH CV ECHO MEAS - MVA(VTI): 2.8 CM^2
BH CV ECHO MEAS - PA ACC SLOPE: 555 CM/SEC^2
BH CV ECHO MEAS - PA ACC TIME: 0.17 SEC
BH CV ECHO MEAS - PA MAX PG: 6.7 MMHG
BH CV ECHO MEAS - PA PR(ACCEL): 1.6 MMHG
BH CV ECHO MEAS - PA V2 MAX: 129 CM/SEC
BH CV ECHO MEAS - RAP SYSTOLE: 3 MMHG
BH CV ECHO MEAS - SI(AO): 107.9 ML/M^2
BH CV ECHO MEAS - SI(CUBED): 47.1 ML/M^2
BH CV ECHO MEAS - SI(LVOT): 31.8 ML/M^2
BH CV ECHO MEAS - SI(MOD-SP2): 16.1 ML/M^2
BH CV ECHO MEAS - SI(MOD-SP4): 19.4 ML/M^2
BH CV ECHO MEAS - SI(TEICH): 38.4 ML/M^2
BH CV ECHO MEAS - SV(AO): 261.1 ML
BH CV ECHO MEAS - SV(CUBED): 114.1 ML
BH CV ECHO MEAS - SV(LVOT): 77 ML
BH CV ECHO MEAS - SV(MOD-SP2): 39 ML
BH CV ECHO MEAS - SV(MOD-SP4): 47 ML
BH CV ECHO MEAS - SV(TEICH): 92.9 ML
BH CV ECHO MEAS - TAPSE (>1.6): 3.1 CM
BH CV ECHO MEASUREMENTS AVERAGE E/E' RATIO: 9.53
BH CV VAS BP RIGHT ARM: NORMAL MMHG
BH CV XLRA - RV BASE: 3.9 CM
BH CV XLRA - RV LENGTH: 7.6 CM
BH CV XLRA - RV MID: 3.4 CM
BH CV XLRA - TDI S': 14.3 CM/SEC
IVRT: 106 MSEC
LEFT ATRIUM VOLUME INDEX: 17.3 ML/M^2
LEFT ATRIUM VOLUME: 42 ML
LV EF 2D ECHO EST: 70 %

## 2021-12-08 PROCEDURE — 93306 TTE W/DOPPLER COMPLETE: CPT

## 2021-12-08 PROCEDURE — 99213 OFFICE O/P EST LOW 20 MIN: CPT | Performed by: INTERNAL MEDICINE

## 2021-12-08 PROCEDURE — 93306 TTE W/DOPPLER COMPLETE: CPT | Performed by: INTERNAL MEDICINE

## 2021-12-08 RX ORDER — SILDENAFIL 100 MG/1
TABLET, FILM COATED ORAL AS NEEDED
COMMUNITY
Start: 2021-11-29

## 2021-12-09 ENCOUNTER — HOSPITAL ENCOUNTER (OUTPATIENT)
Dept: CARDIOLOGY | Facility: HOSPITAL | Age: 66
End: 2021-12-09

## 2021-12-15 ENCOUNTER — DOCUMENTATION (OUTPATIENT)
Dept: CARDIAC REHAB | Facility: HOSPITAL | Age: 66
End: 2021-12-15

## 2021-12-15 NOTE — PROGRESS NOTES
Cardiac Rehab is still on Covid 19 restrictions. We are calling patients as we get spaces open . Sorry for the inconvenience  .     Patient stated he was not interested  in starting the program at this time. He said he is due to see cardio in January and if they recommend it again he will have to make sure insurance covers. He stated he will call back if he decides to start after the first of the year

## 2022-01-10 ENCOUNTER — APPOINTMENT (OUTPATIENT)
Dept: CARDIOLOGY | Facility: HOSPITAL | Age: 67
End: 2022-01-10

## 2022-01-21 ENCOUNTER — HOSPITAL ENCOUNTER (OUTPATIENT)
Dept: CARDIOLOGY | Facility: HOSPITAL | Age: 67
Discharge: HOME | End: 2022-01-21
Admitting: INTERNAL MEDICINE

## 2022-01-21 ENCOUNTER — TELEPHONE (OUTPATIENT)
Dept: CARDIOLOGY | Facility: HOSPITAL | Age: 67
End: 2022-01-21

## 2022-01-21 DIAGNOSIS — I35.9 AVD (AORTIC VALVE DISEASE): ICD-10-CM

## 2022-01-21 DIAGNOSIS — I35.0 AORTIC STENOSIS, SEVERE: ICD-10-CM

## 2022-01-21 DIAGNOSIS — I35.0 AORTIC STENOSIS, SEVERE: Primary | ICD-10-CM

## 2022-01-21 LAB
BH CV ECHO MEAS - AO MAX PG (FULL): 34 MMHG
BH CV ECHO MEAS - AO MAX PG: 30 MMHG
BH CV ECHO MEAS - AO MEAN PG (FULL): 17 MMHG
BH CV ECHO MEAS - AO MEAN PG: 16 MMHG
BH CV ECHO MEAS - AO ROOT AREA (BSA CORRECTED): 1.2
BH CV ECHO MEAS - AO ROOT AREA: 7.1 CM^2
BH CV ECHO MEAS - AO ROOT DIAM: 3 CM
BH CV ECHO MEAS - AO V2 MAX: 276 CM/SEC
BH CV ECHO MEAS - AO V2 MEAN: 204 CM/SEC
BH CV ECHO MEAS - AO V2 VTI: 50.2 CM
BH CV ECHO MEAS - ASC AORTA: 3.1 CM
BH CV ECHO MEAS - AVA(I,A): 1.3 CM^2
BH CV ECHO MEAS - AVA(I,D): 1.6 CM^2
BH CV ECHO MEAS - AVA(V,A): 1.2 CM^2
BH CV ECHO MEAS - AVA(V,D): 1.2 CM^2
BH CV ECHO MEAS - BSA(HAYCOCK): 2.5 M^2
BH CV ECHO MEAS - BSA: 2.4 M^2
BH CV ECHO MEAS - BZI_BMI: 31.5 KILOGRAMS/M^2
BH CV ECHO MEAS - BZI_METRIC_HEIGHT: 190.5 CM
BH CV ECHO MEAS - BZI_METRIC_WEIGHT: 114.3 KG
BH CV ECHO MEAS - EDV(CUBED): 96.1 ML
BH CV ECHO MEAS - EDV(TEICH): 96.3 ML
BH CV ECHO MEAS - EF(CUBED): 69 %
BH CV ECHO MEAS - EF(MOD-BP): 60 %
BH CV ECHO MEAS - EF(TEICH): 60.6 %
BH CV ECHO MEAS - ESV(CUBED): 29.8 ML
BH CV ECHO MEAS - ESV(TEICH): 37.9 ML
BH CV ECHO MEAS - FS: 32.3 %
BH CV ECHO MEAS - IVS/LVPW: 0.95
BH CV ECHO MEAS - IVSD: 0.96 CM
BH CV ECHO MEAS - LA DIMENSION: 3.6 CM
BH CV ECHO MEAS - LA/AO: 1.2
BH CV ECHO MEAS - LV MASS(C)D: 154.9 GRAMS
BH CV ECHO MEAS - LV MASS(C)DI: 64 GRAMS/M^2
BH CV ECHO MEAS - LV MAX PG: 6.2 MMHG
BH CV ECHO MEAS - LV MEAN PG: 3 MMHG
BH CV ECHO MEAS - LV V1 MAX: 124 CM/SEC
BH CV ECHO MEAS - LV V1 MEAN: 77.9 CM/SEC
BH CV ECHO MEAS - LV V1 VTI: 24.5 CM
BH CV ECHO MEAS - LVIDD: 4.6 CM
BH CV ECHO MEAS - LVIDS: 3.1 CM
BH CV ECHO MEAS - LVOT AREA (M): 3.1 CM^2
BH CV ECHO MEAS - LVOT AREA: 3.1 CM^2
BH CV ECHO MEAS - LVOT DIAM: 2.1 CM
BH CV ECHO MEAS - LVPWD: 1 CM
BH CV ECHO MEAS - RAP SYSTOLE: 3 MMHG
BH CV ECHO MEAS - RVSP: 18.8 MMHG
BH CV ECHO MEAS - SI(AO): 176.4 ML/M^2
BH CV ECHO MEAS - SI(CUBED): 27.4 ML/M^2
BH CV ECHO MEAS - SI(LVOT): 31.8 ML/M^2
BH CV ECHO MEAS - SI(TEICH): 24.1 ML/M^2
BH CV ECHO MEAS - SV(AO): 426.9 ML
BH CV ECHO MEAS - SV(CUBED): 66.3 ML
BH CV ECHO MEAS - SV(LVOT): 77 ML
BH CV ECHO MEAS - SV(TEICH): 58.4 ML
BH CV ECHO MEAS - TR MAX PG: 15.8 MMHG
BH CV ECHO MEAS - TR MAX VEL: 199 CM/SEC
LV EF 2D ECHO EST: 60 %

## 2022-01-21 PROCEDURE — 93308 TTE F-UP OR LMTD: CPT

## 2022-01-21 PROCEDURE — 93325 DOPPLER ECHO COLOR FLOW MAPG: CPT

## 2022-01-21 PROCEDURE — 93321 DOPPLER ECHO F-UP/LMTD STD: CPT

## 2022-01-21 PROCEDURE — 93325 DOPPLER ECHO COLOR FLOW MAPG: CPT | Performed by: INTERNAL MEDICINE

## 2022-01-21 PROCEDURE — 93308 TTE F-UP OR LMTD: CPT | Performed by: INTERNAL MEDICINE

## 2022-01-21 PROCEDURE — 93321 DOPPLER ECHO F-UP/LMTD STD: CPT | Performed by: INTERNAL MEDICINE

## 2022-01-21 RX ORDER — WARFARIN SODIUM 5 MG/1
5 TABLET ORAL
Qty: 30 TABLET | Refills: 2 | Status: SHIPPED | OUTPATIENT
Start: 2022-01-21 | End: 2022-08-11

## 2022-01-21 NOTE — TELEPHONE ENCOUNTER
HELIO SELBY    Spoke with Dr. White.  Follow up limited echo for aortic valve gradients continues to indicate elevated mean gradient @ 16 mm Hg.   Dr. White recommends warfarin x 3 months and then another limited echo for AV gradients in April.      Spoke with patient to update him on echo results and Dr. White recommendations.  He is agreeable.  DC meloxicam and he verbalized understanding.    Spoke with PCP office, Dr. Greene.  Bethany states he can be seen on Tuesday 1/25 @ 3:30pm.  PT/INR will be drawn in clinic and warfarin adjusted accordingly.    Gabriella SELBY

## 2022-02-10 ENCOUNTER — TELEPHONE (OUTPATIENT)
Dept: CARDIOLOGY | Facility: HOSPITAL | Age: 67
End: 2022-02-10

## 2022-02-10 NOTE — TELEPHONE ENCOUNTER
Pt wife called with questions about his valve replacement.    Returned call to Livia @ 10 am.  No answer.  YASEMIN SELBY    Mrs. Villa returned my call this AM.  She asked if being on the warfarin is permament or if the valve will have to be removed.  Advised that generally when we find the TAVR prosthesis elevated velocities on the first post op echo, it improves within three months and patients can resume aspirin or ASA + plavix (to be determined by Cardiology).  We have not had any cases that required extraction.      Reviewed date/time for his re-visit for echo and clinic visit with Dr. White in April.  Mrs. Villa verbalized understanding.      Gabriella SELBY

## 2022-02-16 ENCOUNTER — TELEPHONE (OUTPATIENT)
Dept: CARDIOLOGY | Facility: CLINIC | Age: 67
End: 2022-02-16

## 2022-04-13 ENCOUNTER — HOSPITAL ENCOUNTER (OUTPATIENT)
Dept: CARDIOLOGY | Facility: HOSPITAL | Age: 67
Discharge: HOME | End: 2022-04-13
Admitting: NURSE PRACTITIONER

## 2022-04-13 ENCOUNTER — OFFICE VISIT (OUTPATIENT)
Dept: CARDIOLOGY | Facility: CLINIC | Age: 67
End: 2022-04-13

## 2022-04-13 VITALS
DIASTOLIC BLOOD PRESSURE: 80 MMHG | HEIGHT: 75 IN | SYSTOLIC BLOOD PRESSURE: 150 MMHG | HEART RATE: 71 BPM | BODY MASS INDEX: 32.33 KG/M2 | WEIGHT: 260 LBS | OXYGEN SATURATION: 96 %

## 2022-04-13 VITALS — WEIGHT: 251.32 LBS | HEIGHT: 75 IN | BODY MASS INDEX: 31.25 KG/M2

## 2022-04-13 DIAGNOSIS — R06.09 DOE (DYSPNEA ON EXERTION): ICD-10-CM

## 2022-04-13 DIAGNOSIS — I35.0 AORTIC STENOSIS, SEVERE: ICD-10-CM

## 2022-04-13 DIAGNOSIS — I35.9 AVD (AORTIC VALVE DISEASE): ICD-10-CM

## 2022-04-13 DIAGNOSIS — I35.9 AVD (AORTIC VALVE DISEASE): Primary | ICD-10-CM

## 2022-04-13 LAB
BH CV ECHO MEAS - AO MAX PG (FULL): 25.5 MMHG
BH CV ECHO MEAS - AO MAX PG: 24 MMHG
BH CV ECHO MEAS - AO MEAN PG (FULL): 14.5 MMHG
BH CV ECHO MEAS - AO MEAN PG: 12 MMHG
BH CV ECHO MEAS - AO V2 MAX: 245 CM/SEC
BH CV ECHO MEAS - AO V2 MEAN: 193.2 CM/SEC
BH CV ECHO MEAS - AO V2 VTI: 50 CM
BH CV ECHO MEAS - AVA(I,A): 1.4 CM^2
BH CV ECHO MEAS - AVA(I,D): 1.6 CM^2
BH CV ECHO MEAS - AVA(V,A): 1.3 CM^2
BH CV ECHO MEAS - AVA(V,D): 1.3 CM^2
BH CV ECHO MEAS - BSA(HAYCOCK): 2.5 M^2
BH CV ECHO MEAS - BSA: 2.4 M^2
BH CV ECHO MEAS - BZI_BMI: 31.5 KILOGRAMS/M^2
BH CV ECHO MEAS - BZI_METRIC_HEIGHT: 190.5 CM
BH CV ECHO MEAS - BZI_METRIC_WEIGHT: 114.3 KG
BH CV ECHO MEAS - EDV(CUBED): 136.8 ML
BH CV ECHO MEAS - EDV(MOD-SP2): 103 ML
BH CV ECHO MEAS - EDV(MOD-SP4): 109 ML
BH CV ECHO MEAS - EDV(TEICH): 126.8 ML
BH CV ECHO MEAS - EF(CUBED): 65 %
BH CV ECHO MEAS - EF(MOD-BP): 65 %
BH CV ECHO MEAS - EF(MOD-SP2): 64.1 %
BH CV ECHO MEAS - EF(MOD-SP4): 63.3 %
BH CV ECHO MEAS - EF(TEICH): 56.2 %
BH CV ECHO MEAS - ESV(CUBED): 47.9 ML
BH CV ECHO MEAS - ESV(MOD-SP2): 37 ML
BH CV ECHO MEAS - ESV(MOD-SP4): 40 ML
BH CV ECHO MEAS - ESV(TEICH): 55.6 ML
BH CV ECHO MEAS - FS: 29.5 %
BH CV ECHO MEAS - LV DIASTOLIC VOL/BSA (35-75): 45 ML/M^2
BH CV ECHO MEAS - LV MAX PG: 4.5 MMHG
BH CV ECHO MEAS - LV MEAN PG: 2.6 MMHG
BH CV ECHO MEAS - LV SYSTOLIC VOL/BSA (12-30): 16.5 ML/M^2
BH CV ECHO MEAS - LV V1 MAX: 106.4 CM/SEC
BH CV ECHO MEAS - LV V1 MEAN: 74.5 CM/SEC
BH CV ECHO MEAS - LV V1 VTI: 24.7 CM
BH CV ECHO MEAS - LVIDD: 5.2 CM
BH CV ECHO MEAS - LVIDS: 3.6 CM
BH CV ECHO MEAS - LVLD AP2: 8.9 CM
BH CV ECHO MEAS - LVLD AP4: 8.3 CM
BH CV ECHO MEAS - LVLS AP2: 7.1 CM
BH CV ECHO MEAS - LVLS AP4: 7.1 CM
BH CV ECHO MEAS - LVOT AREA (M): 3.1 CM^2
BH CV ECHO MEAS - LVOT AREA: 3.3 CM^2
BH CV ECHO MEAS - LVOT DIAM: 2 CM
BH CV ECHO MEAS - SI(CUBED): 36.7 ML/M^2
BH CV ECHO MEAS - SI(LVOT): 33.5 ML/M^2
BH CV ECHO MEAS - SI(MOD-SP2): 27.3 ML/M^2
BH CV ECHO MEAS - SI(MOD-SP4): 28.5 ML/M^2
BH CV ECHO MEAS - SI(TEICH): 29.4 ML/M^2
BH CV ECHO MEAS - SV(CUBED): 88.9 ML
BH CV ECHO MEAS - SV(LVOT): 81 ML
BH CV ECHO MEAS - SV(MOD-SP2): 66 ML
BH CV ECHO MEAS - SV(MOD-SP4): 69 ML
BH CV ECHO MEAS - SV(TEICH): 71.2 ML
BH CV VAS BP RIGHT ARM: NORMAL MMHG
LV EF 2D ECHO EST: 60 %
MAXIMAL PREDICTED HEART RATE: 153 BPM
STRESS TARGET HR: 130 BPM

## 2022-04-13 PROCEDURE — 93325 DOPPLER ECHO COLOR FLOW MAPG: CPT | Performed by: INTERNAL MEDICINE

## 2022-04-13 PROCEDURE — 93321 DOPPLER ECHO F-UP/LMTD STD: CPT

## 2022-04-13 PROCEDURE — 93308 TTE F-UP OR LMTD: CPT

## 2022-04-13 PROCEDURE — 93308 TTE F-UP OR LMTD: CPT | Performed by: INTERNAL MEDICINE

## 2022-04-13 PROCEDURE — 93321 DOPPLER ECHO F-UP/LMTD STD: CPT | Performed by: INTERNAL MEDICINE

## 2022-04-13 PROCEDURE — 99213 OFFICE O/P EST LOW 20 MIN: CPT | Performed by: INTERNAL MEDICINE

## 2022-04-13 PROCEDURE — 93325 DOPPLER ECHO COLOR FLOW MAPG: CPT

## 2022-04-13 RX ORDER — LOSARTAN POTASSIUM 25 MG/1
25 TABLET ORAL DAILY
Qty: 30 TABLET | Refills: 11 | Status: SHIPPED | OUTPATIENT
Start: 2022-04-13

## 2022-04-13 RX ORDER — BACLOFEN 5 MG/1
1 TABLET ORAL AS NEEDED
COMMUNITY
Start: 2022-04-09

## 2022-04-13 NOTE — PROGRESS NOTES
"[facility] Cardiology    Patient ID: Ron Villa is a 66 y.o. male.  : 1955   Contact: [phone]    Encounter date: 2022    PCP: Geovanni Greene MD      Chief complaint:   Chief Complaint   Patient presents with   • AVD (aortic valve disease)     Problem List:  1. Aortic stenosis  a. \"Heart murmur\" x 20 years   b. Symptoms of worsening chest pain and shortness of breath summer 2021  c. Stress MPS 2021: normal LVEF, small-sized mild degree of ischemia located in inferior wall, \"low-risk study\"  d. Echo 2021: EF 66-70%. Moderate AV calcification. Severe AS (peak velocity 408cm/sec, mean gradient 34mmHg, peak gradient 67 mmHg). Mild MR.  e. R/LHC, 2021: Normal LVSF and coronary arteries. Severe aortic stenosis. Near normal hemodynamics.    f. TERRI, 2021: Pre-TAVR. EF 55-60%. LVH. Trace MR and TR. Mild AI and AV appears bicuspid. Severe AS, pk/mn gradient 40/22mmHg. DENNY by continuity 0.79cm2. Aorta grade 3/5 atheroma.   g. TAVR/TERRI, 2021 (Dr. White): EF 60%. Placement  of a 29 mm Nix ANTHONY 3 pericardial prosthesis: small paravalvular leak is seen beneath the right coronary leaflet. Mean gradient 7 mmHg. Calculated valve area 2.4 cm². <1cm pericardial effusion, unchanged following procedure.   h. Echocardiogram, 2021: AV mean gradient 16 mmHg.  Normal LVEF.  (Mean gradient was 7 mm at the time of implantation.)    i. Echocardiogram, 2022: EF 60%. Pericardial prosthesis is present in the aortic position. AV mean gradient 16 mmHg. No paravalvular leak seen. Started on Coumadin  j. Echo 22, [facility]: EF 60%, 29 mm ANTHONY 3 pericardial prothesis. Ao mean PG 12 mmHg.   2. Osteoarthritis   3. DANNY on CPAP     No Known Allergies    Current Medications:    Current Outpatient Medications:   •  aspirin 81 MG EC tablet, Take 81 mg by mouth Daily., Disp: , Rfl:   •  fexofenadine (ALLEGRA) 180 MG tablet, Take 180 mg by mouth Daily., " Disp: , Rfl:   •  Misc Natural Products (OSTEO BI-FLEX TRIPLE STRENGTH PO), Take 1 tablet by mouth Daily., Disp: , Rfl:   •  multivitamin with minerals tablet tablet, Take 1 tablet by mouth Daily., Disp: , Rfl:   •  Omega-3 Fatty Acids (FISH OIL PO), Take 1,400 mg by mouth Daily With Breakfast., Disp: , Rfl:   •  pantoprazole (PROTONIX) 40 MG EC tablet, Take 40 mg by mouth Daily. \, Disp: , Rfl:   •  sildenafil (VIAGRA) 100 MG tablet, Take  by mouth As Needed., Disp: , Rfl:   •  vitamin B-12 (CYANOCOBALAMIN) 1000 MCG tablet, Take 1,000 mcg by mouth Daily., Disp: , Rfl:   •  vitamin C (ASCORBIC ACID) 250 MG tablet, Take 500 mg by mouth Daily., Disp: , Rfl:   •  vitamin D3 125 MCG (5000 UT) capsule capsule, Take 1,000 Units by mouth Daily., Disp: , Rfl:   •  Vitamin E 180 MG (400 UNIT) capsule capsule, Take 1 capsule by mouth Daily., Disp: , Rfl:   •  warfarin (COUMADIN) 5 MG tablet, Take 1 tablet by mouth Daily. Blood thinner for heart valve, Disp: 30 tablet, Rfl: 2  •  Baclofen 5 MG tablet, Take 1 tablet by mouth As Needed., Disp: , Rfl:     HPI    Ron Villa is a 66 y.o. male who presents today for 4-month follow up of aortic valve disease s/p TAVR 11/2021. He had elevated mean pressure gradient across the aortic valve in Jan. At which time he was started on Coumadin. Since last visit, patient has continued to have CHACKO. He reports that this has remained unchanged since before his TAVR in November. He had echo today which showed a mean PG of 12 mmHg across the aortic valve. His INR has been therapeutic. No major bleeding or bruising. He denies PND, orthopnea, edema.  BP has been mostly 140-160's systolic.       The following portions of the patient's history were reviewed and updated as appropriate: allergies, current medications and problem list.    Pertinent positives as listed in the HPI.  All other systems reviewed are negative.         Vitals:    04/13/22 1414   BP: 150/80   BP Location: Left arm   Patient  "Position: Sitting   Cuff Size: Adult   Pulse: 71   SpO2: 96%   Weight: 118 kg (260 lb)   Height: 190.5 cm (75\")       Physical Exam:  General: Alert and oriented.  Neck: Jugular venous pressure is within normal limits. Carotids have normal upstrokes without bruits.   Cardiovascular: Heart has a nondisplaced focal PMI. Regular rate and rhythm. No murmur, gallop or rub.  Lungs: Clear, no rales or wheezes. Equal expansion is noted.   Extremities: Show no edema.  Skin: Warm and dry.  Neurologic: Nonfocal.     Diagnostic Data (reviewed with patient):  Lab Results   Component Value Date    GLUCOSE 118 (H) 11/09/2021    CALCIUM 8.6 11/09/2021     11/09/2021    K 3.9 11/09/2021    CO2 23.0 11/09/2021     11/09/2021    BUN 16 11/09/2021    CREATININE 0.79 11/09/2021    EGFRIFNONA 98 11/09/2021    BCR 20.3 11/09/2021    ANIONGAP 9.0 11/09/2021      Lab Results   Component Value Date    GLUCOSE 118 (H) 11/09/2021    BUN 16 11/09/2021    CREATININE 0.79 11/09/2021    EGFRIFNONA 98 11/09/2021    BCR 20.3 11/09/2021     11/09/2021    K 3.9 11/09/2021     11/09/2021    CO2 23.0 11/09/2021    CALCIUM 8.6 11/09/2021    ALBUMIN 4.50 11/05/2021    ALKPHOS 57 11/05/2021    AST 31 11/05/2021    ALT 42 (H) 11/05/2021     Lab Results   Component Value Date    CHOL 167 08/27/2021    TRIG 89 08/27/2021    HDL 39 (L) 08/27/2021     (H) 08/27/2021      Lab Results   Component Value Date    WBC 12.00 (H) 11/09/2021    RBC 4.72 11/09/2021    HGB 14.9 11/09/2021    HCT 44.2 11/09/2021    MCV 93.6 11/09/2021     11/09/2021         Procedures      Assessment:    ICD-10-CM ICD-9-CM   1. AVD (aortic valve disease)  I35.9 424.1   2. TAVR 11/08/21  I35.0 424.1       Plan:  1. Losartan.   2. BMP same time as GXT  3. GXT for CHACKO with pulse ox. Concern for hypoxia or chronotropic incompetence.    4. Remain on Coumadin and repeat echo in 3 months  5. Continue all other current medications.  6. F/up in 3 months, sooner " if needed.    I discussed plan with Dr. White who is in agreement.       Electronically signed by BAL Solorzano, 04/13/22, 2:51 PM EDT.

## 2022-04-28 ENCOUNTER — APPOINTMENT (OUTPATIENT)
Dept: CARDIOLOGY | Facility: HOSPITAL | Age: 67
End: 2022-04-28

## 2022-05-03 ENCOUNTER — HOSPITAL ENCOUNTER (OUTPATIENT)
Dept: CARDIOLOGY | Facility: HOSPITAL | Age: 67
Discharge: HOME | End: 2022-05-03
Admitting: NURSE PRACTITIONER

## 2022-05-03 DIAGNOSIS — R06.09 DOE (DYSPNEA ON EXERTION): ICD-10-CM

## 2022-05-03 PROCEDURE — 93018 CV STRESS TEST I&R ONLY: CPT | Performed by: INTERNAL MEDICINE

## 2022-05-03 PROCEDURE — 93017 CV STRESS TEST TRACING ONLY: CPT

## 2022-05-04 LAB
BH CV STRESS BP STAGE 1: NORMAL
BH CV STRESS BP STAGE 2: NORMAL
BH CV STRESS DURATION MIN STAGE 1: 3
BH CV STRESS DURATION MIN STAGE 2: 3
BH CV STRESS DURATION SEC STAGE 1: 0
BH CV STRESS DURATION SEC STAGE 2: 25
BH CV STRESS GRADE STAGE 1: 10
BH CV STRESS GRADE STAGE 2: 12
BH CV STRESS HR STAGE 1: 100
BH CV STRESS HR STAGE 2: 130
BH CV STRESS METS STAGE 1: 5
BH CV STRESS METS STAGE 2: 7.5
BH CV STRESS O2 STAGE 1: 95
BH CV STRESS O2 STAGE 2: 97
BH CV STRESS PROTOCOL 1: NORMAL
BH CV STRESS RECOVERY BP: NORMAL MMHG
BH CV STRESS RECOVERY HR: 90 BPM
BH CV STRESS RECOVERY O2: 97 %
BH CV STRESS SPEED STAGE 1: 1.7
BH CV STRESS SPEED STAGE 2: 2.5
BH CV STRESS STAGE 1: 1
BH CV STRESS STAGE 2: 2
MAXIMAL PREDICTED HEART RATE: 153 BPM
PERCENT MAX PREDICTED HR: 85.62 %
STRESS BASELINE BP: NORMAL MMHG
STRESS BASELINE HR: 70 BPM
STRESS O2 SAT REST: 96 %
STRESS PERCENT HR: 101 %
STRESS POST ESTIMATED WORKLOAD: 7.4 METS
STRESS POST EXERCISE DUR MIN: 6 MIN
STRESS POST EXERCISE DUR SEC: 25 SEC
STRESS POST O2 SAT PEAK: 97 %
STRESS POST PEAK BP: NORMAL MMHG
STRESS POST PEAK HR: 131 BPM
STRESS TARGET HR: 130 BPM

## 2022-06-07 ENCOUNTER — TRANSCRIBE ORDERS (OUTPATIENT)
Dept: ADMINISTRATIVE | Facility: HOSPITAL | Age: 67
End: 2022-06-07

## 2022-06-07 DIAGNOSIS — R31.0 GROSS HEMATURIA: Primary | ICD-10-CM

## 2022-06-16 ENCOUNTER — HOSPITAL ENCOUNTER (OUTPATIENT)
Dept: CT IMAGING | Facility: HOSPITAL | Age: 67
Discharge: HOME | End: 2022-06-16
Admitting: INTERNAL MEDICINE

## 2022-06-16 DIAGNOSIS — R31.0 GROSS HEMATURIA: ICD-10-CM

## 2022-06-16 PROCEDURE — 74176 CT ABD & PELVIS W/O CONTRAST: CPT

## 2022-06-16 PROCEDURE — 74176 CT ABD & PELVIS W/O CONTRAST: CPT | Performed by: RADIOLOGY

## 2022-06-23 ENCOUNTER — APPOINTMENT (OUTPATIENT)
Dept: CT IMAGING | Facility: HOSPITAL | Age: 67
End: 2022-06-23

## 2022-08-10 NOTE — PROGRESS NOTES
"[facility] Cardiology    Patient ID: Ron Villa is a 67 y.o. male.  : 1955   Contact: [phone]    Encounter date: 2022    PCP: Geovanni Greene MD      Chief complaint: No chief complaint on file.      Problem List:  1. Aortic stenosis  a. \"Heart murmur\" x 20 years   b. Symptoms of worsening chest pain and shortness of breath summer 2021  c. Stress MPS 2021: normal LVEF, small-sized mild degree of ischemia located in inferior wall, \"low-risk study\"  d. Echo 2021: EF 66-70%. Moderate AV calcification. Severe AS (peak velocity 408cm/sec, mean gradient 34mmHg, peak gradient 67 mmHg). Mild MR.  e. R/LHC, 2021: Normal LVSF and coronary arteries. Severe aortic stenosis. Near normal hemodynamics.    f. TERRI, 2021: Pre-TAVR. EF 55-60%. LVH. Trace MR and TR. Mild AI and AV appears bicuspid. Severe AS, pk/mn gradient 40/22mmHg. DENNY by continuity 0.79cm2. Aorta grade 3/5 atheroma.   g. TAVR/TERRI, 2021 (Dr. White): EF 60%. Placement  of a 29 mm Nix ANTHONY 3 pericardial prosthesis: small paravalvular leak is seen beneath the right coronary leaflet. Mean gradient 7 mmHg. Calculated valve area 2.4 cm². <1cm pericardial effusion, unchanged following procedure.   h. Echocardiogram, 2021: AV mean gradient 16 mmHg.  Normal LVEF.  (Mean gradient was 7 mm at the time of implantation.)    i. Echocardiogram, 2022: EF 60%. Pericardial prosthesis is present in the aortic position. AV mean gradient 16 mmHg. No paravalvular leak seen. Started on Coumadin  j. Echo 22, [facility]: EF 60%, 29 mm ANTHONY 3 pericardial prothesis. Ao mean PG 12 mmHg.   k. Treadmill stress test, 2022; The patient reported shortness of breath but no chest discomfort during exercise. Oxygen saturation 97% with exercise. THR of 130 bpm met at 6:16. Mild reduction in exercise tolerance. Expected exercise duration 7:40. Actual exercise duration 6:20. Test stopped due to " patient's inability to keep up with the pace and shortness of breath. Normal blood pressure response to exercise. No ST segment shift from baseline was seen with exercise. No evidence of inducible ischemia by clinical or electrocardiographic criteria.  l. Echocardiogram 8/11/2022: Normal LVEF.  29 mm ANTHONY 3 TAVR valve gradient 13 mmHg.  2. Osteoarthritis   3. DANNY on CPAP        No Known Allergies    Current Medications:    Current Outpatient Medications:   •  aspirin 81 MG EC tablet, Take 81 mg by mouth Daily., Disp: , Rfl:   •  Baclofen 5 MG tablet, Take 1 tablet by mouth As Needed., Disp: , Rfl:   •  fexofenadine (ALLEGRA) 180 MG tablet, Take 180 mg by mouth Daily., Disp: , Rfl:   •  losartan (Cozaar) 25 MG tablet, Take 1 tablet by mouth Daily., Disp: 30 tablet, Rfl: 11  •  Misc Natural Products (OSTEO BI-FLEX TRIPLE STRENGTH PO), Take 1 tablet by mouth Daily., Disp: , Rfl:   •  multivitamin with minerals tablet tablet, Take 1 tablet by mouth Daily., Disp: , Rfl:   •  Omega-3 Fatty Acids (FISH OIL PO), Take 1,400 mg by mouth Daily With Breakfast., Disp: , Rfl:   •  pantoprazole (PROTONIX) 40 MG EC tablet, Take 40 mg by mouth Daily. \, Disp: , Rfl:   •  sildenafil (VIAGRA) 100 MG tablet, Take  by mouth As Needed., Disp: , Rfl:   •  vitamin B-12 (CYANOCOBALAMIN) 1000 MCG tablet, Take 1,000 mcg by mouth Daily., Disp: , Rfl:   •  vitamin C (ASCORBIC ACID) 250 MG tablet, Take 500 mg by mouth Daily., Disp: , Rfl:   •  vitamin D3 125 MCG (5000 UT) capsule capsule, Take 1,000 Units by mouth Daily., Disp: , Rfl:   •  Vitamin E 180 MG (400 UNIT) capsule capsule, Take 1 capsule by mouth Daily., Disp: , Rfl:     HPI    Ron Villa is a 67 y.o. male who presents today for a follow up of aortic valve disease and cardiac risk factors. Since last visit, the patient has been doing well overall from a cardiovascular standpoint. Patient maintains a stable activity level by doing leg lifts, sit ups, and other exercises for his  "back. He was walking but due to the heat, he has had to discontinue that. He asks if he could discontinue the warfarin due to blood in his urine and was curious if the warfarin could be cause.  He does have an appointment with urology.  Patient denies chest pain, shortness of breath, orthopnea, palpitations, edema, dizziness, and syncope.      The following portions of the patient's history were reviewed and updated as appropriate: allergies, current medications and problem list.    Pertinent positives as listed in the HPI.  All other systems reviewed are negative.         Vitals:    08/11/22 1335   BP: 120/72   BP Location: Left arm   Patient Position: Sitting   Pulse: 62   SpO2: 97%   Weight: 118 kg (260 lb)   Height: 190.5 cm (75\")       Physical Exam:  General: Alert and oriented.  Neck: Jugular venous pressure is within normal limits. Carotids have normal upstrokes without bruits.   Cardiovascular: Heart has a nondisplaced focal PMI. Regular rate and rhythm. 1/6 SE murmur, no gallop or rub.  Lungs: Clear, no rales or wheezes. Equal expansion is noted.   Extremities: Show no edema.  Skin: Warm and dry.  Neurologic: Nonfocal.     Diagnostic Data (reviewed with patient):  Lab Results   Component Value Date    GLUCOSE 118 (H) 11/09/2021    BUN 16 11/09/2021    CREATININE 0.79 11/09/2021    EGFRIFNONA 98 11/09/2021    BCR 20.3 11/09/2021     11/09/2021    K 3.9 11/09/2021     11/09/2021    CO2 23.0 11/09/2021    CALCIUM 8.6 11/09/2021    ALBUMIN 4.50 11/05/2021    ALKPHOS 57 11/05/2021    AST 31 11/05/2021    ALT 42 (H) 11/05/2021     Lab Results   Component Value Date    CHOL 167 08/27/2021    TRIG 89 08/27/2021    HDL 39 (L) 08/27/2021     (H) 08/27/2021      Lab Results   Component Value Date    WBC 12.00 (H) 11/09/2021    RBC 4.72 11/09/2021    HGB 14.9 11/09/2021    HCT 44.2 11/09/2021    MCV 93.6 11/09/2021     11/09/2021        Procedures      Assessment:    ICD-10-CM ICD-9-CM   1. " AVD (aortic valve disease)  I35.9 424.1   2. TAVR 11/08/21  I35.0 424.1         Plan:  1. Patient can discontinue Warfarin as his aortic valve gradient appears to be stable at 13 mmHg.. Patient should follow up with PCP for hematuria.   2. Echocardiogram ordered to reassess aortic valve gradients to be done with 6 month appointment.   3. Continue on aspirin 81 mg for antiplatelet therapy.   4. Continue on losartan 25 mg daily for hypertension.    5. Continue all other current medications.  6. F/up in 6 months, sooner if needed.      Scribed for Dorie White MD by Martina Palma. 8/11/2022 13:49 EDT         I Dorie White MD personally performed the services described in this documentation as scribed by the above individual in my presence, and it is both accurate and complete.    Dorie White MD, FACC

## 2022-08-11 ENCOUNTER — HOSPITAL ENCOUNTER (OUTPATIENT)
Dept: CARDIOLOGY | Facility: HOSPITAL | Age: 67
Discharge: HOME | End: 2022-08-11
Admitting: NURSE PRACTITIONER

## 2022-08-11 ENCOUNTER — OFFICE VISIT (OUTPATIENT)
Dept: CARDIOLOGY | Facility: CLINIC | Age: 67
End: 2022-08-11

## 2022-08-11 VITALS — WEIGHT: 260 LBS | HEIGHT: 75 IN | BODY MASS INDEX: 32.33 KG/M2

## 2022-08-11 VITALS
HEIGHT: 75 IN | BODY MASS INDEX: 32.33 KG/M2 | WEIGHT: 260 LBS | HEART RATE: 62 BPM | SYSTOLIC BLOOD PRESSURE: 120 MMHG | OXYGEN SATURATION: 97 % | DIASTOLIC BLOOD PRESSURE: 72 MMHG

## 2022-08-11 DIAGNOSIS — I35.9 AVD (AORTIC VALVE DISEASE): ICD-10-CM

## 2022-08-11 DIAGNOSIS — I35.9 AVD (AORTIC VALVE DISEASE): Primary | ICD-10-CM

## 2022-08-11 DIAGNOSIS — I35.0 AORTIC STENOSIS, SEVERE: ICD-10-CM

## 2022-08-11 LAB
ASCENDING AORTA: 3.7 CM
BH CV ECHO MEAS - AO MAX PG: 27 MMHG
BH CV ECHO MEAS - AO MEAN PG: 13 MMHG
BH CV ECHO MEAS - AO ROOT AREA (BSA CORRECTED): 1.4 CM2
BH CV ECHO MEAS - AO ROOT DIAM: 3.4 CM
BH CV ECHO MEAS - AO V2 MAX: 260 CM/SEC
BH CV ECHO MEAS - AO V2 VTI: 47.4 CM
BH CV ECHO MEAS - AVA(I,D): 1.7 CM2
BH CV ECHO MEAS - EDV(CUBED): 216 ML
BH CV ECHO MEAS - EDV(MOD-SP2): 140 ML
BH CV ECHO MEAS - EDV(MOD-SP4): 117 ML
BH CV ECHO MEAS - EF(MOD-BP): 56.7 %
BH CV ECHO MEAS - EF(MOD-SP2): 61.8 %
BH CV ECHO MEAS - EF(MOD-SP4): 51.4 %
BH CV ECHO MEAS - ESV(CUBED): 46.7 ML
BH CV ECHO MEAS - ESV(MOD-SP2): 53.5 ML
BH CV ECHO MEAS - ESV(MOD-SP4): 56.9 ML
BH CV ECHO MEAS - FS: 40 %
BH CV ECHO MEAS - IVS/LVPW: 0.67 CM
BH CV ECHO MEAS - IVSD: 1 CM
BH CV ECHO MEAS - LA DIMENSION: 3.8 CM
BH CV ECHO MEAS - LAT PEAK E' VEL: 7.9 CM/SEC
BH CV ECHO MEAS - LV DIASTOLIC VOL/BSA (35-75): 47.7 CM2
BH CV ECHO MEAS - LV MASS(C)D: 171.9 GRAMS
BH CV ECHO MEAS - LV MAX PG: 5.8 MMHG
BH CV ECHO MEAS - LV MEAN PG: 3 MMHG
BH CV ECHO MEAS - LV SYSTOLIC VOL/BSA (12-30): 23.2 CM2
BH CV ECHO MEAS - LV V1 MAX: 120 CM/SEC
BH CV ECHO MEAS - LV V1 VTI: 24.8 CM
BH CV ECHO MEAS - LVIDD: 5.2 CM
BH CV ECHO MEAS - LVIDS: 3.6 CM
BH CV ECHO MEAS - LVOT AREA: 3.5 CM2
BH CV ECHO MEAS - LVOT DIAM: 2.1 CM
BH CV ECHO MEAS - LVPWD: 1.2 CM
BH CV ECHO MEAS - MED PEAK E' VEL: 7.4 CM/SEC
BH CV ECHO MEAS - MV A MAX VEL: 89.1 CM/SEC
BH CV ECHO MEAS - MV DEC SLOPE: 255 CM/SEC2
BH CV ECHO MEAS - MV DEC TIME: 0.3 MSEC
BH CV ECHO MEAS - MV E MAX VEL: 75.6 CM/SEC
BH CV ECHO MEAS - MV E/A: 0.85
BH CV ECHO MEAS - MV MAX PG: 3.2 MMHG
BH CV ECHO MEAS - MV MEAN PG: 1 MMHG
BH CV ECHO MEAS - MV P1/2T: 90.9 MSEC
BH CV ECHO MEAS - MV V2 VTI: 31.2 CM
BH CV ECHO MEAS - MVA(P1/2T): 2.42 CM2
BH CV ECHO MEAS - MVA(VTI): 2.8 CM2
BH CV ECHO MEAS - PA ACC TIME: 0.13 SEC
BH CV ECHO MEAS - PA PR(ACCEL): 21.9 MMHG
BH CV ECHO MEAS - PA V2 MAX: 149.5 CM/SEC
BH CV ECHO MEAS - SI(MOD-SP2): 35.3 ML/M2
BH CV ECHO MEAS - SI(MOD-SP4): 24.5 ML/M2
BH CV ECHO MEAS - SV(LVOT): 85.9 ML
BH CV ECHO MEAS - SV(MOD-SP2): 86.5 ML
BH CV ECHO MEAS - SV(MOD-SP4): 60.1 ML
BH CV ECHO MEAS - TAPSE (>1.6): 2.04 CM
BH CV ECHO MEASUREMENTS AVERAGE E/E' RATIO: 9.88
BH CV VAS BP RIGHT ARM: NORMAL MMHG
BH CV XLRA - RV BASE: 3.8 CM
BH CV XLRA - RV LENGTH: 8 CM
BH CV XLRA - RV MID: 2.7 CM
BH CV XLRA - TDI S': 13.4 CM/SEC
IVRT: 106 MSEC
LEFT ATRIUM VOLUME INDEX: 28 ML/M2
LV EF 2D ECHO EST: 60 %
MAXIMAL PREDICTED HEART RATE: 152 BPM
STRESS TARGET HR: 129 BPM

## 2022-08-11 PROCEDURE — 93306 TTE W/DOPPLER COMPLETE: CPT | Performed by: INTERNAL MEDICINE

## 2022-08-11 PROCEDURE — 93306 TTE W/DOPPLER COMPLETE: CPT

## 2022-08-11 PROCEDURE — 99214 OFFICE O/P EST MOD 30 MIN: CPT | Performed by: INTERNAL MEDICINE

## 2022-08-19 ENCOUNTER — OFFICE VISIT (OUTPATIENT)
Dept: UROLOGY | Facility: CLINIC | Age: 67
End: 2022-08-19

## 2022-08-19 VITALS — BODY MASS INDEX: 32.33 KG/M2 | WEIGHT: 260 LBS | HEIGHT: 75 IN

## 2022-08-19 DIAGNOSIS — N40.1 BENIGN PROSTATIC HYPERPLASIA WITH URINARY FREQUENCY: ICD-10-CM

## 2022-08-19 DIAGNOSIS — R35.0 BENIGN PROSTATIC HYPERPLASIA WITH URINARY FREQUENCY: ICD-10-CM

## 2022-08-19 DIAGNOSIS — R30.0 DYSURIA: Primary | ICD-10-CM

## 2022-08-19 LAB
BILIRUB BLD-MCNC: NEGATIVE MG/DL
CLARITY, POC: CLEAR
COLOR UR: NORMAL
EXPIRATION DATE: NORMAL
GLUCOSE UR STRIP-MCNC: NEGATIVE MG/DL
KETONES UR QL: NEGATIVE
LEUKOCYTE EST, POC: NEGATIVE
Lab: NORMAL
NITRITE UR-MCNC: NEGATIVE MG/ML
PH UR: 6 [PH] (ref 5–8)
PROT UR STRIP-MCNC: NEGATIVE MG/DL
RBC # UR STRIP: NEGATIVE /UL
SP GR UR: 1.03 (ref 1–1.03)
UROBILINOGEN UR QL: NORMAL

## 2022-08-19 PROCEDURE — 99203 OFFICE O/P NEW LOW 30 MIN: CPT

## 2022-08-19 PROCEDURE — 81003 URINALYSIS AUTO W/O SCOPE: CPT

## 2022-08-19 PROCEDURE — 84153 ASSAY OF PSA TOTAL: CPT

## 2022-08-19 NOTE — PROGRESS NOTES
"Chief Complaint:    Chief Complaint   Patient presents with   • Dysruia        Vital Signs:   Ht 190.5 cm (75\")   Wt 118 kg (260 lb)   BMI 32.50 kg/m²   Body mass index is 32.5 kg/m².      HPI:  Ron Villa is a 67 y.o. male who presents today for initial evaluation     Mr. Villa presents to the clinic today with concerns of dysuria and gross hematuria. Patient reports that the dysuria and bleeding is intermittent. Patient states that for the last year he will have blood in his urine for 2-3 weeks at a time. He denies any blood in his urine for the past month. He has a significant past medical history for skin cancer. He reports that someone had brain cancer on his paternal side. He currently drinks 3-4 beers per day. He quit smoking roughly 40 years ago, and had a history of 1 pack a day for 6 years.He denies fever, chills, nausea, frequency, urgency, difficulty urinating, vomiting, flank pain, or history of UTIs/stones. No known exposure to agent orange or radiation. CT scan was completed in June that showed only an enlarge prostate. UA today is negative.      Past Medical History:  Past Medical History:   Diagnosis Date   • Aortic stenosis, severe 07/2021   • Arthritis    • Cancer (HCC)    • COVID-19 01/2022   • GERD (gastroesophageal reflux disease)    • Sleep apnea    • Wears glasses        Current Meds:  Current Outpatient Medications   Medication Sig Dispense Refill   • aspirin 81 MG EC tablet Take 81 mg by mouth Daily.     • Baclofen 5 MG tablet Take 1 tablet by mouth As Needed.     • fexofenadine (ALLEGRA) 180 MG tablet Take 180 mg by mouth Daily.     • losartan (Cozaar) 25 MG tablet Take 1 tablet by mouth Daily. 30 tablet 11   • Misc Natural Products (OSTEO BI-FLEX TRIPLE STRENGTH PO) Take 1 tablet by mouth Daily.     • multivitamin with minerals tablet tablet Take 1 tablet by mouth Daily.     • Omega-3 Fatty Acids (FISH OIL PO) Take 1,400 mg by mouth Daily With Breakfast.     • pantoprazole (PROTONIX) " 40 MG EC tablet Take 40 mg by mouth Daily. \     • sildenafil (VIAGRA) 100 MG tablet Take  by mouth As Needed.     • vitamin B-12 (CYANOCOBALAMIN) 1000 MCG tablet Take 1,000 mcg by mouth Daily.     • vitamin C (ASCORBIC ACID) 250 MG tablet Take 500 mg by mouth Daily.     • vitamin D3 125 MCG (5000 UT) capsule capsule Take 1,000 Units by mouth Daily.     • Vitamin E 180 MG (400 UNIT) capsule capsule Take 1 capsule by mouth Daily.       No current facility-administered medications for this visit.        Allergies:   No Known Allergies     Past Surgical History:  Past Surgical History:   Procedure Laterality Date   • AORTIC VALVE REPAIR/REPLACEMENT N/A 11/8/2021    Procedure: TRANSCATHETER AORTIC VALVE REPLACEMENT;  Surgeon: Prieto Tripp MD;  Location: [facility];  Service: Cardiothoracic;  Laterality: N/A;  flouro-6 mins 18 sec  dose- 370mgy  contrast-85 ml isovue 370     • AORTIC VALVE REPAIR/REPLACEMENT N/A 11/8/2021    Procedure: Transcatheter Aortic Valve Replacement;  Surgeon: Dorie White MD;  Location: [facility];  Service: Cardiovascular;  Laterality: N/A;   • CARDIAC CATHETERIZATION     • CARDIAC CATHETERIZATION Left 8/27/2021    Procedure: Right and Left Heart Cath--C-19 ORDER FAXED TO  [facility];  Surgeon: Power Burgos MD;  Location: [facility] INVASIVE LOCATION;  Service: Cardiology;  Laterality: Left;  Week of 8/23   • COLONOSCOPY     • HERNIA REPAIR     • TRANSESOPHAGEAL ECHOCARDIOGRAM (TERRI) N/A 11/8/2021    Procedure: TRANSESOPHAGEAL ECHOCARDIOGRAM WITH ANESTHESIA;  Surgeon: Prieto Tripp MD;  Location: [facility];  Service: Cardiothoracic;  Laterality: N/A;       Social History:  Social History     Socioeconomic History   • Marital status:      Spouse name: Livia   • Years of education: HS   Tobacco Use   • Smoking status: Former Smoker     Packs/day: 1.00     Years: 6.00     Pack years: 6.00     Types: Cigarettes     Quit date: 1976     Years  since quittin.6   • Smokeless tobacco: Current User     Types: Chew   Vaping Use   • Vaping Use: Never used   Substance and Sexual Activity   • Alcohol use: Yes     Alcohol/week: 2.0 standard drinks     Types: 2 Cans of beer per week     Comment: daily   • Drug use: Never   • Sexual activity: Defer       Family History:  Family History   Problem Relation Age of Onset   • Heart disease Mother    • Heart attack Father    • No Known Problems Sister    • No Known Problems Brother        Review of Systems:  Review of Systems   Constitutional: Negative for chills, fatigue and fever.   Respiratory: Negative for cough, shortness of breath and wheezing.    Cardiovascular: Negative for leg swelling.   Gastrointestinal: Negative for abdominal pain, nausea and vomiting.   Genitourinary: Positive for dysuria, flank pain and hematuria. Negative for difficulty urinating, frequency and urgency.   Musculoskeletal: Positive for back pain. Negative for joint swelling.   Neurological: Negative for dizziness and headaches.       Physical Exam:  Physical Exam  Constitutional:       General: He is not in acute distress.     Appearance: Normal appearance.   HENT:      Head: Normocephalic and atraumatic.      Nose: Nose normal.      Mouth/Throat:      Mouth: Mucous membranes are moist.   Eyes:      Conjunctiva/sclera: Conjunctivae normal.   Cardiovascular:      Rate and Rhythm: Normal rate and regular rhythm.      Pulses: Normal pulses.      Heart sounds: Normal heart sounds.   Pulmonary:      Effort: Pulmonary effort is normal.      Breath sounds: Normal breath sounds.   Abdominal:      General: Bowel sounds are normal.      Palpations: Abdomen is soft.   Musculoskeletal:         General: Normal range of motion.      Cervical back: Normal range of motion.   Skin:     General: Skin is warm.   Neurological:      General: No focal deficit present.      Mental Status: He is alert and oriented to person, place, and time.   Psychiatric:          Mood and Affect: Mood normal.         Behavior: Behavior normal.         Thought Content: Thought content normal.         Judgment: Judgment normal.         IPSS Questionnaire (AUA-7):  IPSS Questionnaire (AUA-7):                  IPSS Questionnaire (AUA-7):  Over the past month…    1)  Incomplete Emptying  How often have you had a sensation of not emptying your bladder?  0 - Not at all   2)  Frequency  How often have you had to urinate less than every two hours? 3 - About half the time   3)  Intermittency  How often have you found you stopped and started again several times when you urinated?  0 - Not at all   4) Urgency  How often have you found it difficult to postpone urination?  5 - Almost always   5) Weak Stream  How often have you had a weak urinary stream?  3 - About half the time   6) Straining  How often have you had to push or strain to begin urination?  0 - Not at all   7) Nocturia  How many times did you typically get up at night to urinate?  2 - 2 times   Total Score:  13   The International Prostate Symptom Score (IPSS) is used to screen, diagnose, track symptoms of benign prostatic hyperplasia (BPH).    0-7 pts (Mild Symptoms)  / 8-19 pts (Moderate) / 20-35 (Severe)    Quality of life due to urinary symptoms:  If you were to spend the rest of your life with your urinary condition the way it is now, how would you feel about that? 1-Pleased   Urine Leakage (Incontinence) 0-No Leakage       Recent Image (CT and/or KUB):   CT Abdomen and Pelvis: No results found for this or any previous visit.     CT Stone Protocol: Results for orders placed during the hospital encounter of 06/16/22    CT Abdomen Pelvis Stone Protocol    Narrative  CT ABDOMEN PELVIS STONE PROTOCOL-    TECHNIQUE: Multiple axial CT images were obtained from lung bases  through pubic symphysis without administration of IV contrast.  Reformatted images in the coronal and/or sagittal plane(s) were  generated from the axial data set to  facilitate diagnostic accuracy  and/or surgical planning.  Oral Contrast:NONE.    Radiation dose reduction techniques were utilized per ALARA protocol.  Automated exposure control was initiated through either or [employer] or  D[employer] software packages by  protocol.  DOSE: 1152.15 mGy.cm    Clinical information r31.0; R31.0-Gross hematuria    Comparison none immediately available at this time    FINDINGS:    Lower thorax: Clear. No effusions.    Abdomen:    Liver: Low-attenuation lesions in the liver. These are nonspecific and  likely represent hepatic cysts. They were present on a prior CT  01/31/2013.    Gallbladder: Contracted.    Pancreas: Unremarkable. No mass or ductal dilatation.    Spleen: Homogeneous. No splenomegaly.    Adrenals: No mass.    Kidneys/ureters: No mass. No obstructive uropathy.  No evidence of  urolithiasis.    GI tract: Moderate stool burden. There is no evidence of appendicitis    MESENTERY: Stranding in the mid mesentery. This is nonspecific.    Vasculature: No evidence of aneurysm.    Abdominal wall: No focal hernia or mass.      Bladder: No focal mass or significant wall thickening    Reproductive: Prostate gland is enlarged.    Bones: Fairly extensive arthritic change with disc space narrowing at  L2-3 and L4-5.    Impression  1. Stranding in the mid mesentery image 48 of the axial series.  Nonspecific. No adjacent bowel wall thickening, free fluid, or  walled-off fluid collections.  2. No obstructive uropathy or urolithiasis. The prostate gland is  enlarged.  3. Other findings as above.              This report was finalized on 6/16/2022 3:37 PM by Dr. Artis Edge MD.     KUB: No results found for this or any previous visit.       Labs:  Brief Urine Lab Results  (Last result in the past 365 days)      Color   Clarity   Blood   Leuk Est   Nitrite   Protein   CREAT   Urine HCG        08/19/22 1259 Dark Yellow   Clear   Negative   Negative   Negative   Negative                Office Visit on 08/19/2022   Component Date Value Ref Range Status   • Color 08/19/2022 Dark Yellow  Yellow, Straw, Dark Yellow, [place] Final   • Clarity, UA 08/19/2022 Clear  Clear Final   • Specific Gravity  08/19/2022 1.030  1.005 - 1.030 Final   • pH, Urine 08/19/2022 6.0  5.0 - 8.0 Final   • Leukocytes 08/19/2022 Negative  Negative Final   • Nitrite, UA 08/19/2022 Negative  Negative Final   • Protein, POC 08/19/2022 Negative  Negative mg/dL Final   • Glucose, UA 08/19/2022 Negative  Negative mg/dL Final   • Ketones, UA 08/19/2022 Negative  Negative Final   • Urobilinogen, UA 08/19/2022 Normal  Normal, 0.2 E.U./dL Final   • Bilirubin 08/19/2022 Negative  Negative Final   • Blood, UA 08/19/2022 Negative  Negative Final   • Lot Number 08/19/2022 [id number]   Final   • Expiration Date 08/19/2022 122,[id number]   Final   Hospital Outpatient Visit on 08/11/2022   Component Date Value Ref Range Status   • Target HR (85%) 08/11/2022 129  bpm Final   • Max. Pred. HR (100%) 08/11/2022 152  bpm Final   • BH CV VAS BP RIGHT ARM 08/11/2022 131/75  mmHg Final   • RV S' 08/11/2022 13.4  cm/sec Final   • RV Base 08/11/2022 3.8  cm Final   • RV Length 08/11/2022 8.0  cm Final   • RV Mid 08/11/2022 2.7  cm Final   • Ascending aorta 08/11/2022 3.7  cm Final   • IVRT 08/11/2022 106.0  msec Final   • LA ESV Index (BP) 08/11/2022 28.0  ml/m2 Final   • Avg E/e' ratio 08/11/2022 9.88   Final   • Ao root diam 08/11/2022 3.4  cm Final   • Ao pk ester 08/11/2022 260.0  cm/sec Final   • Ao V2 VTI 08/11/2022 47.4  cm Final   • DENNY(I,D) 08/11/2022 1.7  cm2 Final   • EDV(cubed) 08/11/2022 216.0  ml Final   • EDV(MOD-sp2) 08/11/2022 140.0  ml Final   • EDV(MOD-sp4) 08/11/2022 117.0  ml Final   • EF(MOD-bp) 08/11/2022 56.7  % Final   • EF(MOD-sp2) 08/11/2022 61.8  % Final   • EF(MOD-sp4) 08/11/2022 51.4  % Final   • ESV(cubed) 08/11/2022 46.7  ml Final   • ESV(MOD-sp2) 08/11/2022 53.5  ml Final   • ESV(MOD-sp4) 08/11/2022 56.9  ml Final   •  IVS/LVPW 08/11/2022 0.67  cm Final   • Lat Peak E' James 08/11/2022 7.9  cm/sec Final   • LV mass(C)d 08/11/2022 171.9  grams Final   • LV V1 max PG 08/11/2022 5.8  mmHg Final   • LV V1 mean PG 08/11/2022 3.0  mmHg Final   • LV V1 max 08/11/2022 120.0  cm/sec Final   • LVPWd 08/11/2022 1.2  cm Final   • Med Peak E' James 08/11/2022 7.4  cm/sec Final   • MV dec slope 08/11/2022 255.0  cm/sec2 Final   • MV dec time 08/11/2022 0.30  msec Final   • MV P1/2t 08/11/2022 90.9  msec Final   • MV V2 VTI 08/11/2022 31.2  cm Final   • MVA(VTI) 08/11/2022 2.8  cm2 Final   • PA acc time 08/11/2022 0.13  sec Final   • PA pr(Accel) 08/11/2022 21.9  mmHg Final   • PA V2 max 08/11/2022 149.5  cm/sec Final   • SI(MOD-sp2) 08/11/2022 35.3  ml/m2 Final   • SI(MOD-sp4) 08/11/2022 24.5  ml/m2 Final   • SV(LVOT) 08/11/2022 85.9  ml Final   • SV(MOD-sp2) 08/11/2022 86.5  ml Final   • SV(MOD-sp4) 08/11/2022 60.1  ml Final   • Ao max PG 08/11/2022 27  mmHg Final   • Ao mean PG 08/11/2022 13  mmHg Final   • FS 08/11/2022 40.0  % Final   • IVSd 08/11/2022 1.0  cm Final   • LA dimension (2D)  08/11/2022 3.8  cm Final   • LV V1 VTI 08/11/2022 24.8  cm Final   • LVIDd 08/11/2022 5.2  cm Final   • LVIDs 08/11/2022 3.6  cm Final   • LVOT area 08/11/2022 3.5  cm2 Final   • LVOT diam 08/11/2022 2.10  cm Final   • MV E/A 08/11/2022 0.85   Final   • MV max PG 08/11/2022 3.2  mmHg Final   • MV mean PG 08/11/2022 1.00  mmHg Final   • MVA(P1/2t) 08/11/2022 2.42  cm2 Final   • MV A max james 08/11/2022 89.1  cm/sec Final   • MV E max james 08/11/2022 75.6  cm/sec Final   • Ao root area (BSA corrected) 08/11/2022 1.4  cm2 Final   • LV Tracy Vol (BSA corrected) 08/11/2022 47.7  cm2 Final   • LV Sys Vol (BSA corrected) 08/11/2022 23.2  cm2 Final   • TAPSE (>1.6) 08/11/2022 2.04  cm Final   • Echo EF Estimated 08/11/2022 60  % Final        Procedure:  Procedures     I have reviewed and agree with the above PMH, PSH, FMH, social history, medications, allergies, and  labs.     Assessment/Plan:   Problem List Items Addressed This Visit    None     Visit Diagnoses     Dysuria    -  Primary    Relevant Orders    POC Urinalysis Dipstick, Automated (Completed)    Benign prostatic hyperplasia with urinary frequency        Relevant Orders    PSA Diagnostic          Health Maintenance:   Health Maintenance Due   Topic Date Due   • URINE MICROALBUMIN  Never done   • COLORECTAL CANCER SCREENING  Never done   • Pneumococcal Vaccine 65+ (1 - PCV) Never done   • TDAP/TD VACCINES (1 - Tdap) Never done   • ZOSTER VACCINE (1 of 2) Never done   • HEPATITIS C SCREENING  Never done   • ANNUAL WELLNESS VISIT  Never done   • DIABETIC FOOT EXAM  Never done   • DIABETIC EYE EXAM  Never done   • COVID-19 Vaccine (4 - Booster for Pfizer series) 02/04/2022        Smoking Counseling: Former Smoker; Does not currently smoke.    Urine Incontinence: Patient reports that he is not currently experiencing any symptoms of urinary incontinence.    Patient was given instructions and counseling regarding his condition or for health maintenance advice. Please see specific information pulled into the AVS if appropriate.    Patient Education:   Dysuria/Hematuria - We discussed the causes of dysuria/hematuria that include but are not limited to nephrolithiasis, urinary tract infections, sexually transmitted infections, prostatitis, or prostate cancer. We discussed the risk factors that are associated with bladder cancer that include but are not limited to family history, radiation exposure, past history of smoking. Patient was educated to increase his intake of water to 1-2L per day. We discussed that alcohol can be a contributing factor to dysuria if the patient is dehydrated. Patient was educated to keep a diary of what he drinks and eats daily to determine any contributing factors that may be causing dysuria. Patient was educated that a cystoscopy can be completed to look for any signs of bleeding within the  bladder. Patient states that he would like to have the procedure performed and will be scheduled with Dr. Munoz for cystoscopy.   Benign Prostate Hypertrophy (BPH): Patient was found to have an enlarged prostate on CT in June 2022. IPSS score of 13 puts him at moderate risk for BPH. Discussed the pathophysiology of BPH and obstruction.  We discussed the static and dynamic effects of BPH as well as using 5 alpha reductase inhibitors versus alpha blockade.  We discussed the indications for transurethral surgery as well and/ or other therapeutic options available including all of the newer techniques.  PSA testing - I am recommending a prostate specific antigen blood test or PSA.  I discussed the pathophysiology of PSA testing indicating its use in the diagnosis and management of prostate cancer.  I discussed the normal range being 0 to 4, but more appropriately being much closer to 0 to 2 in a normal male.  I discussed the fact that after a certain age it is against recommendation to use PSA testing especially in view of numerous comorbidities.  I discussed many of the things that can artificially raise PSA including put not limited to a recent infection, urinary tract infection, recent sexual intercourse, or even the type of movement such as manipulation of the prostate from riding a bicycle.  It was discussed that the most important use of PSA is the velocity measurement.  This refers to the change of PSA with time. I discussed that we look for greater than 20% rise over a year to help us make the prediction of prostate cancer.  I also discussed that in the case of prostate cancer indicating a radical prostatectomy, the PSA should be 0 and any rise indicates an early biochemical recurrence.    Visit Diagnoses:    ICD-10-CM ICD-9-CM   1. Dysuria  R30.0 788.1   2. Benign prostatic hyperplasia with urinary frequency  N40.1 600.01    R35.0 788.41       Meds Ordered During Visit:  No orders of the defined types were  placed in this encounter.      Follow Up Appointment:   No follow-ups on file.      This document has been electronically signed by Santosh Serrano PA-C   August 19, 2022 14:50 EDT    Part of this note may be an electronic transcription/translation of spoken language to printed text using the Dragon Dictation System.

## 2022-08-20 LAB — PSA SERPL-MCNC: 0.57 NG/ML (ref 0–4)

## 2022-11-08 ENCOUNTER — PROCEDURE VISIT (OUTPATIENT)
Dept: UROLOGY | Facility: CLINIC | Age: 67
End: 2022-11-08

## 2022-11-08 VITALS — BODY MASS INDEX: 32.33 KG/M2 | WEIGHT: 260 LBS | HEIGHT: 75 IN

## 2022-11-08 DIAGNOSIS — R31.0 GROSS HEMATURIA: Primary | ICD-10-CM

## 2022-11-08 PROCEDURE — 52000 CYSTOURETHROSCOPY: CPT | Performed by: UROLOGY

## 2022-11-08 NOTE — PROGRESS NOTES
Chief Complaint:      Chief Complaint   Patient presents with   • Dysuria       HPI:   67 y.o. male status post an aortic valvular replacement currently on Coumadin.  Had gross hematuria.  CT was negative here for cystoscopy    Past Medical History:     Past Medical History:   Diagnosis Date   • Aortic stenosis, severe 07/2021   • Arthritis    • Cancer (HCC)    • COVID-19 01/2022   • GERD (gastroesophageal reflux disease)    • Sleep apnea    • Wears glasses        Current Meds:     Current Outpatient Medications   Medication Sig Dispense Refill   • aspirin 81 MG EC tablet Take 81 mg by mouth Daily.     • Baclofen 5 MG tablet Take 1 tablet by mouth As Needed.     • fexofenadine (ALLEGRA) 180 MG tablet Take 180 mg by mouth Daily.     • losartan (Cozaar) 25 MG tablet Take 1 tablet by mouth Daily. 30 tablet 11   • Misc Natural Products (OSTEO BI-FLEX TRIPLE STRENGTH PO) Take 1 tablet by mouth Daily.     • multivitamin with minerals tablet tablet Take 1 tablet by mouth Daily.     • Omega-3 Fatty Acids (FISH OIL PO) Take 1,400 mg by mouth Daily With Breakfast.     • pantoprazole (PROTONIX) 40 MG EC tablet Take 40 mg by mouth Daily. \     • sildenafil (VIAGRA) 100 MG tablet Take  by mouth As Needed.     • vitamin B-12 (CYANOCOBALAMIN) 1000 MCG tablet Take 1,000 mcg by mouth Daily.     • vitamin C (ASCORBIC ACID) 250 MG tablet Take 500 mg by mouth Daily.     • vitamin D3 125 MCG (5000 UT) capsule capsule Take 1,000 Units by mouth Daily.     • Vitamin E 180 MG (400 UNIT) capsule capsule Take 1 capsule by mouth Daily.       No current facility-administered medications for this visit.        Allergies:      No Known Allergies     Past Surgical History:     Past Surgical History:   Procedure Laterality Date   • AORTIC VALVE REPAIR/REPLACEMENT N/A 11/8/2021    Procedure: TRANSCATHETER AORTIC VALVE REPLACEMENT;  Surgeon: Prieto Tripp MD;  Location: [facility];  Service: Cardiothoracic;  Laterality: N/A;  [facility]  [facility] 18 sec  dose- 370mgy  contrast-85 ml isovue 370     • AORTIC VALVE REPAIR/REPLACEMENT N/A 2021    Procedure: Transcatheter Aortic Valve Replacement;  Surgeon: Dorie White MD;  Location:  [facility];  Service: Cardiovascular;  Laterality: N/A;   • CARDIAC CATHETERIZATION     • CARDIAC CATHETERIZATION Left 2021    Procedure: Right and Left Heart Cath--C-19 ORDER FAXED TO  [facility];  Surgeon: Power Burgos MD;  Location:  [facility];  Service: Cardiology;  Laterality: Left;  Week of    • COLONOSCOPY     • HERNIA REPAIR     • TRANSESOPHAGEAL ECHOCARDIOGRAM (TERRI) N/A 2021    Procedure: TRANSESOPHAGEAL ECHOCARDIOGRAM WITH ANESTHESIA;  Surgeon: Prieto Tripp MD;  Location:  [facility];  Service: Cardiothoracic;  Laterality: N/A;       Social History:     Social History     Socioeconomic History   • Marital status:      Spouse name: Livia   • Years of education: HS   Tobacco Use   • Smoking status: Former     Packs/day: 1.00     Years: 6.00     Pack years: 6.00     Types: Cigarettes     Quit date:      Years since quittin.8   • Smokeless tobacco: Current     Types: Chew   Vaping Use   • Vaping Use: Never used   Substance and Sexual Activity   • Alcohol use: Yes     Alcohol/week: 2.0 standard drinks     Types: 2 Cans of beer per week     Comment: daily   • Drug use: Never   • Sexual activity: Defer       Family History:     Family History   Problem Relation Age of Onset   • Heart disease Mother    • Heart attack Father    • No Known Problems Sister    • No Known Problems Brother        Review of Systems:     Review of Systems   Constitutional: Negative.    HENT: Negative.    Eyes: Negative.    Respiratory: Negative.    Cardiovascular: Negative.    Gastrointestinal: Negative.    Endocrine: Negative.    Musculoskeletal: Negative.    Allergic/Immunologic: Negative.    Neurological: Negative.    Hematological: Negative.     Psychiatric/Behavioral: Negative.        Physical Exam:     Physical Exam  Vitals and nursing note reviewed.   Constitutional:       Appearance: He is well-developed.   HENT:      Head: Normocephalic and atraumatic.   Eyes:      Conjunctiva/sclera: Conjunctivae normal.      Pupils: Pupils are equal, round, and reactive to light.   Cardiovascular:      Rate and Rhythm: Normal rate and regular rhythm.      Heart sounds: Normal heart sounds.   Pulmonary:      Effort: Pulmonary effort is normal.      Breath sounds: Normal breath sounds.   Abdominal:      General: Bowel sounds are normal.      Palpations: Abdomen is soft.   Genitourinary:     Penis: Normal.       Testes: Normal.   Musculoskeletal:         General: Normal range of motion.      Cervical back: Normal range of motion.   Skin:     General: Skin is warm and dry.   Neurological:      Mental Status: He is alert and oriented to person, place, and time.      Deep Tendon Reflexes: Reflexes are normal and symmetric.   Psychiatric:         Behavior: Behavior normal.         Thought Content: Thought content normal.         Judgment: Judgment normal.         I have reviewed the following portions of the patient's history: Allergies, current medications, past family history, past medical history, past social history, past surgical history, problem list, and ROS and confirm it is accurate.    Recent Image (CT and/or KUB):      CT Abdomen and Pelvis: No results found for this or any previous visit.       CT Stone Protocol: Results for orders placed during the hospital encounter of 06/16/22    CT Abdomen Pelvis Stone Protocol    Narrative  CT ABDOMEN PELVIS STONE PROTOCOL-    TECHNIQUE: Multiple axial CT images were obtained from lung bases  through pubic symphysis without administration of IV contrast.  Reformatted images in the coronal and/or sagittal plane(s) were  generated from the axial data set to facilitate diagnostic accuracy  and/or surgical planning.  Oral  Contrast:NONE.    Radiation dose reduction techniques were utilized per ALARA protocol.  Automated exposure control was initiated through either or [employer] or  [employer] software packages by  protocol.  DOSE: 1152.15 mGy.cm    Clinical information r31.0; R31.0-Gross hematuria    Comparison none immediately available at this time    FINDINGS:    Lower thorax: Clear. No effusions.    Abdomen:    Liver: Low-attenuation lesions in the liver. These are nonspecific and  likely represent hepatic cysts. They were present on a prior CT  01/31/2013.    Gallbladder: Contracted.    Pancreas: Unremarkable. No mass or ductal dilatation.    Spleen: Homogeneous. No splenomegaly.    Adrenals: No mass.    Kidneys/ureters: No mass. No obstructive uropathy.  No evidence of  urolithiasis.    GI tract: Moderate stool burden. There is no evidence of appendicitis    MESENTERY: Stranding in the mid mesentery. This is nonspecific.    Vasculature: No evidence of aneurysm.    Abdominal wall: No focal hernia or mass.      Bladder: No focal mass or significant wall thickening    Reproductive: Prostate gland is enlarged.    Bones: Fairly extensive arthritic change with disc space narrowing at  L2-3 and L4-5.    Impression  1. Stranding in the mid mesentery image 48 of the axial series.  Nonspecific. No adjacent bowel wall thickening, free fluid, or  walled-off fluid collections.  2. No obstructive uropathy or urolithiasis. The prostate gland is  enlarged.  3. Other findings as above.              This report was finalized on 6/16/2022 3:37 PM by Dr. Atris Edge MD.       KUB: No results found for this or any previous visit.       Labs (past 3 months):      Office Visit on 08/19/2022   Component Date Value Ref Range Status   • Color 08/19/2022 Dark Yellow  Yellow, Straw, Dark Yellow, [place] Final   • Clarity, UA 08/19/2022 Clear  Clear Final   • Specific Gravity  08/19/2022 1.030  1.005 - 1.030 Final   • pH, Urine 08/19/2022 6.0  5.0  - 8.0 Final   • Leukocytes 08/19/2022 Negative  Negative Final   • Nitrite, UA 08/19/2022 Negative  Negative Final   • Protein, POC 08/19/2022 Negative  Negative mg/dL Final   • Glucose, UA 08/19/2022 Negative  Negative mg/dL Final   • Ketones, UA 08/19/2022 Negative  Negative Final   • Urobilinogen, UA 08/19/2022 Normal  Normal, 0.2 E.U./dL Final   • Bilirubin 08/19/2022 Negative  Negative Final   • Blood, UA 08/19/2022 Negative  Negative Final   • Lot Number 08/19/2022 [id number]   Final   • Expiration Date 08/19/2022 122,123   Final   • PSA 08/19/2022 0.567  0.000 - 4.000 ng/mL Final   Hospital Outpatient Visit on 08/11/2022   Component Date Value Ref Range Status   • Target HR (85%) 08/11/2022 129  bpm Final   • Max. Pred. HR (100%) 08/11/2022 152  bpm Final   • BH CV VAS BP RIGHT ARM 08/11/2022 131/75  mmHg Final   • RV S' 08/11/2022 13.4  cm/sec Final   • RV Base 08/11/2022 3.8  cm Final   • RV Length 08/11/2022 8.0  cm Final   • RV Mid 08/11/2022 2.7  cm Final   • Ascending aorta 08/11/2022 3.7  cm Final   • IVRT 08/11/2022 106.0  msec Final   • LA ESV Index (BP) 08/11/2022 28.0  ml/m2 Final   • Avg E/e' ratio 08/11/2022 9.88   Final   • Ao root diam 08/11/2022 3.4  cm Final   • Ao pk james 08/11/2022 260.0  cm/sec Final   • Ao V2 VTI 08/11/2022 47.4  cm Final   • DENNY(I,D) 08/11/2022 1.7  cm2 Final   • EDV(cubed) 08/11/2022 216.0  ml Final   • EDV(MOD-sp2) 08/11/2022 140.0  ml Final   • EDV(MOD-sp4) 08/11/2022 117.0  ml Final   • EF(MOD-bp) 08/11/2022 56.7  % Final   • EF(MOD-sp2) 08/11/2022 61.8  % Final   • EF(MOD-sp4) 08/11/2022 51.4  % Final   • ESV(cubed) 08/11/2022 46.7  ml Final   • ESV(MOD-sp2) 08/11/2022 53.5  ml Final   • ESV(MOD-sp4) 08/11/2022 56.9  ml Final   • IVS/LVPW 08/11/2022 0.67  cm Final   • Lat Peak E' James 08/11/2022 7.9  cm/sec Final   • LV mass(C)d 08/11/2022 171.9  grams Final   • LV V1 max PG 08/11/2022 5.8  mmHg Final   • LV V1 mean PG 08/11/2022 3.0  mmHg Final   • LV V1 max  08/11/2022 120.0  cm/sec Final   • LVPWd 08/11/2022 1.2  cm Final   • Med Peak E' James 08/11/2022 7.4  cm/sec Final   • MV dec slope 08/11/2022 255.0  cm/sec2 Final   • MV dec time 08/11/2022 0.30  msec Final   • MV P1/2t 08/11/2022 90.9  msec Final   • MV V2 VTI 08/11/2022 31.2  cm Final   • MVA(VTI) 08/11/2022 2.8  cm2 Final   • PA acc time 08/11/2022 0.13  sec Final   • PA pr(Accel) 08/11/2022 21.9  mmHg Final   • PA V2 max 08/11/2022 149.5  cm/sec Final   • SI(MOD-sp2) 08/11/2022 35.3  ml/m2 Final   • SI(MOD-sp4) 08/11/2022 24.5  ml/m2 Final   • SV(LVOT) 08/11/2022 85.9  ml Final   • SV(MOD-sp2) 08/11/2022 86.5  ml Final   • SV(MOD-sp4) 08/11/2022 60.1  ml Final   • Ao max PG 08/11/2022 27  mmHg Final   • Ao mean PG 08/11/2022 13  mmHg Final   • FS 08/11/2022 40.0  % Final   • IVSd 08/11/2022 1.0  cm Final   • LA dimension (2D)  08/11/2022 3.8  cm Final   • LV V1 VTI 08/11/2022 24.8  cm Final   • LVIDd 08/11/2022 5.2  cm Final   • LVIDs 08/11/2022 3.6  cm Final   • LVOT area 08/11/2022 3.5  cm2 Final   • LVOT diam 08/11/2022 2.10  cm Final   • MV E/A 08/11/2022 0.85   Final   • MV max PG 08/11/2022 3.2  mmHg Final   • MV mean PG 08/11/2022 1.00  mmHg Final   • MVA(P1/2t) 08/11/2022 2.42  cm2 Final   • MV A max james 08/11/2022 89.1  cm/sec Final   • MV E max james 08/11/2022 75.6  cm/sec Final   • Ao root area (BSA corrected) 08/11/2022 1.4  cm2 Final   • LV Tracy Vol (BSA corrected) 08/11/2022 47.7  cm2 Final   • LV Sys Vol (BSA corrected) 08/11/2022 23.2  cm2 Final   • TAPSE (>1.6) 08/11/2022 2.04  cm Final   • Echo EF Estimated 08/11/2022 60  % Final        Procedure:   Cystoscopy:  Patient presents today for cystourethroscopy.  I went ahead and obtained an informed consent including the risks of anesthesia, bleeding, infection, etc.  After prepping and draping in a sterile fashion in the low dorsal lithotomy position, the urethra was gently anesthetized with 10 mL of 2% viscous Xylocaine jelly.  After an  appropriate period of topical anesthesia, I used the Olympus digital 14 [place] flexible cystoscope to examine the anterior urethra which was completely normal.  The ureteral orifices were visualized and normal in position and configuration. There were no stones, tumors, or foreign bodies.  The blue light was enabled and was negative allowing us to see small mucosal lesions. The patient was given 80 mg of gentamicin in an intramuscular fashion as prophylaxis for the cystoscopy and released from the clinic.    Assessment/Plan:   Hematuria-patient was diagnosed with hematuria.  We discussed the significance of microscopic hematuria versus gross hematuria.  We discussed the presence or absence of the type of clotting identified including vermiform clots consistent with ureteral bleeding versus just pink-tinged urine versus naomi clots.  We discussed the presence of urokinase in the urine which causes the clots to dissolve with time.  We discussed the fact that it takes only a very small amount of blood in the urine to make the urine very red appearing and therefore give one the impression that there is much more blood loss that is really present.  I discussed the use of both an upper and lower tract investigation.  I discussed the fact that an upper tract investigation includes a normal renal ultrasound with a significant risk of missing more subtle lesions.  Progressing to a CT scan without contrast and finally the CT scan with contrast being the gold standard to diagnose the small neoplasms.  We discussed the lower tract investigation consisting of a cystoscopy in many of the cases where the upper tract study is negative.  Also discussed the fact that if there is a contraindication to the use of contrast we would do a noncontrasted study and this also has a chance of missing small lesions.  The specific instance would be cases of diabetes and chronic renal insufficiency.  Discussed the fact that there is about a 96%  chance of a negative workup with episodes of microscopic hematuria and with much greater in the face of gross hematuria.  We discussed the fact that this is a non-cumulative test.  In other words, if there is hematuria next year I would recommend continuing to work up the condition because of the fact that neoplasms may be small at the first workup and easily are missed.  I discussed the differential diagnosis of hematuria including trauma, neoplasia, infection, etc.  We discussed the fact that if there is any history of chronic kidney disease or risk factors such as diabetes for contrast a noncontrasted study will be utilized.  We will initiate an investigation.  Thus far work-up is negative.              This document has been electronically signed by FREDDIE STEPHENSON MD November 8, 2022 09:19 EST    Dictated Utilizing Dragon Dictation: Part of this note may be an electronic transcription/translation of spoken language to printed text using the Dragon Dictation System.

## 2022-11-18 PROBLEM — R31.0 GROSS HEMATURIA: Status: ACTIVE | Noted: 2022-11-18

## 2022-12-05 ENCOUNTER — HOSPITAL ENCOUNTER (OUTPATIENT)
Dept: CARDIOLOGY | Facility: HOSPITAL | Age: 67
Discharge: HOME | End: 2022-12-05
Admitting: INTERNAL MEDICINE

## 2022-12-05 VITALS — HEIGHT: 75 IN | BODY MASS INDEX: 32.35 KG/M2 | WEIGHT: 260.14 LBS

## 2022-12-05 DIAGNOSIS — I35.0 AORTIC STENOSIS, SEVERE: ICD-10-CM

## 2022-12-05 DIAGNOSIS — I35.9 AVD (AORTIC VALVE DISEASE): ICD-10-CM

## 2022-12-05 LAB
ASCENDING AORTA: 3.5 CM
BH CV ECHO MEAS - AO MAX PG: 27 MMHG
BH CV ECHO MEAS - AO MEAN PG: 15 MMHG
BH CV ECHO MEAS - AO ROOT DIAM: 2.4 CM
BH CV ECHO MEAS - AO V2 MAX: 250 CM/SEC
BH CV ECHO MEAS - AO V2 VTI: 50 CM
BH CV ECHO MEAS - AVA(I,D): 1.31 CM2
BH CV ECHO MEAS - EDV(CUBED): 116.2 ML
BH CV ECHO MEAS - EDV(MOD-SP2): 96 ML
BH CV ECHO MEAS - EDV(MOD-SP4): 110 ML
BH CV ECHO MEAS - EF(MOD-BP): 63 %
BH CV ECHO MEAS - EF(MOD-SP2): 66.7 %
BH CV ECHO MEAS - EF(MOD-SP4): 60.9 %
BH CV ECHO MEAS - ESV(CUBED): 27.8 ML
BH CV ECHO MEAS - ESV(MOD-SP2): 32 ML
BH CV ECHO MEAS - ESV(MOD-SP4): 43 ML
BH CV ECHO MEAS - FS: 37.9 %
BH CV ECHO MEAS - IVS/LVPW: 0.99 CM
BH CV ECHO MEAS - IVSD: 0.95 CM
BH CV ECHO MEAS - LA DIMENSION: 3.6 CM
BH CV ECHO MEAS - LAT PEAK E' VEL: 7 CM/SEC
BH CV ECHO MEAS - LV DIASTOLIC VOL/BSA (35-75): 44.8 CM2
BH CV ECHO MEAS - LV MASS(C)D: 164.4 GRAMS
BH CV ECHO MEAS - LV MAX PG: 3.8 MMHG
BH CV ECHO MEAS - LV MEAN PG: 2.5 MMHG
BH CV ECHO MEAS - LV SYSTOLIC VOL/BSA (12-30): 17.5 CM2
BH CV ECHO MEAS - LV V1 MAX: 97.1 CM/SEC
BH CV ECHO MEAS - LV V1 VTI: 21.1 CM
BH CV ECHO MEAS - LVIDD: 4.9 CM
BH CV ECHO MEAS - LVIDS: 3 CM
BH CV ECHO MEAS - LVOT AREA: 3.1 CM2
BH CV ECHO MEAS - LVOT DIAM: 2 CM
BH CV ECHO MEAS - LVPWD: 0.96 CM
BH CV ECHO MEAS - MED PEAK E' VEL: 6.68 CM/SEC
BH CV ECHO MEAS - MV A MAX VEL: 94.8 CM/SEC
BH CV ECHO MEAS - MV DEC SLOPE: 293 CM/SEC2
BH CV ECHO MEAS - MV DEC TIME: 0.3 MSEC
BH CV ECHO MEAS - MV E MAX VEL: 67.1 CM/SEC
BH CV ECHO MEAS - MV E/A: 0.71
BH CV ECHO MEAS - MV MAX PG: 4.2 MMHG
BH CV ECHO MEAS - MV MEAN PG: 2 MMHG
BH CV ECHO MEAS - MV P1/2T: 82.1 MSEC
BH CV ECHO MEAS - MV V2 VTI: 33.8 CM
BH CV ECHO MEAS - MVA(P1/2T): 2.7 CM2
BH CV ECHO MEAS - MVA(VTI): 1.96 CM2
BH CV ECHO MEAS - PA ACC SLOPE: 798 CM/SEC2
BH CV ECHO MEAS - PA ACC TIME: 0.15 SEC
BH CV ECHO MEAS - PA PR(ACCEL): 12.4 MMHG
BH CV ECHO MEAS - PA V2 MAX: 168 CM/SEC
BH CV ECHO MEAS - PI END-D VEL: 69.2 CM/SEC
BH CV ECHO MEAS - RAP SYSTOLE: 3 MMHG
BH CV ECHO MEAS - RVSP: 18 MMHG
BH CV ECHO MEAS - SI(MOD-SP2): 26.1 ML/M2
BH CV ECHO MEAS - SI(MOD-SP4): 27.3 ML/M2
BH CV ECHO MEAS - SV(LVOT): 66.1 ML
BH CV ECHO MEAS - SV(MOD-SP2): 64 ML
BH CV ECHO MEAS - SV(MOD-SP4): 67 ML
BH CV ECHO MEAS - TAPSE (>1.6): 2.2 CM
BH CV ECHO MEAS - TR MAX PG: 14.7 MMHG
BH CV ECHO MEAS - TR MAX VEL: 192 CM/SEC
BH CV ECHO MEASUREMENTS AVERAGE E/E' RATIO: 9.81
BH CV XLRA - RV BASE: 3.9 CM
BH CV XLRA - RV LENGTH: 7.2 CM
BH CV XLRA - RV MID: 3.1 CM
BH CV XLRA - TDI S': 13.2 CM/SEC
LEFT ATRIUM VOLUME INDEX: 18.8 ML/M2
LV EF 2D ECHO EST: 60 %
MAXIMAL PREDICTED HEART RATE: 152 BPM
STRESS TARGET HR: 129 BPM

## 2022-12-05 PROCEDURE — 93306 TTE W/DOPPLER COMPLETE: CPT

## 2022-12-05 PROCEDURE — 93306 TTE W/DOPPLER COMPLETE: CPT | Performed by: INTERNAL MEDICINE

## 2022-12-06 ENCOUNTER — DOCUMENTATION (OUTPATIENT)
Dept: CARDIOLOGY | Facility: HOSPITAL | Age: 67
End: 2022-12-06

## 2022-12-06 NOTE — PROGRESS NOTES
I spoke with Mr. Villa today to complete his KCCQ. He is doing very well with minimal SOB (if walking up a hill fast). 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 He has his 1 yr TTE yesterday and I informed him of the results.    KCCQ 59/70. Greatly improved.

## 2023-02-01 ENCOUNTER — OFFICE VISIT (OUTPATIENT)
Dept: CARDIOLOGY | Facility: CLINIC | Age: 68
End: 2023-02-01
Payer: MEDICARE

## 2023-02-01 VITALS
HEART RATE: 58 BPM | WEIGHT: 260 LBS | HEIGHT: 75 IN | SYSTOLIC BLOOD PRESSURE: 132 MMHG | OXYGEN SATURATION: 93 % | DIASTOLIC BLOOD PRESSURE: 78 MMHG | BODY MASS INDEX: 32.33 KG/M2

## 2023-02-01 DIAGNOSIS — Z95.2 S/P TAVR (TRANSCATHETER AORTIC VALVE REPLACEMENT): ICD-10-CM

## 2023-02-01 DIAGNOSIS — I35.9 AVD (AORTIC VALVE DISEASE): Primary | ICD-10-CM

## 2023-02-01 DIAGNOSIS — R09.89 CAROTID BRUIT, UNSPECIFIED LATERALITY: ICD-10-CM

## 2023-02-01 PROCEDURE — 99213 OFFICE O/P EST LOW 20 MIN: CPT | Performed by: INTERNAL MEDICINE

## 2023-06-06 ENCOUNTER — APPOINTMENT (OUTPATIENT)
Dept: ULTRASOUND IMAGING | Facility: HOSPITAL | Age: 68
End: 2023-06-06
Payer: MEDICARE

## 2023-06-06 ENCOUNTER — APPOINTMENT (OUTPATIENT)
Dept: GENERAL RADIOLOGY | Facility: HOSPITAL | Age: 68
End: 2023-06-06
Payer: MEDICARE

## 2023-06-06 ENCOUNTER — HOSPITAL ENCOUNTER (OUTPATIENT)
Facility: HOSPITAL | Age: 68
Setting detail: OBSERVATION
Discharge: HOME | End: 2023-06-08
Attending: STUDENT IN AN ORGANIZED HEALTH CARE EDUCATION/TRAINING PROGRAM | Admitting: INTERNAL MEDICINE
Payer: MEDICARE

## 2023-06-06 DIAGNOSIS — F19.90 MISUSE OF MEDICATION: ICD-10-CM

## 2023-06-06 DIAGNOSIS — R53.1 GENERALIZED WEAKNESS: Primary | ICD-10-CM

## 2023-06-06 LAB
ALBUMIN SERPL-MCNC: 4.2 G/DL (ref 3.5–5.2)
ALBUMIN/GLOB SERPL: 1.4 G/DL
ALP SERPL-CCNC: 51 U/L (ref 39–117)
ALT SERPL W P-5'-P-CCNC: 35 U/L (ref 1–41)
ANION GAP SERPL CALCULATED.3IONS-SCNC: 14.7 MMOL/L (ref 5–15)
APTT PPP: 26.5 SECONDS (ref 26.5–34.5)
AST SERPL-CCNC: 29 U/L (ref 1–40)
BASOPHILS # BLD AUTO: 0.04 10*3/MM3 (ref 0–0.2)
BASOPHILS NFR BLD AUTO: 0.5 % (ref 0–1.5)
BILIRUB SERPL-MCNC: 0.6 MG/DL (ref 0–1.2)
BUN SERPL-MCNC: 17 MG/DL (ref 8–23)
BUN/CREAT SERPL: 17.3 (ref 7–25)
CALCIUM SPEC-SCNC: 8.8 MG/DL (ref 8.6–10.5)
CHLORIDE SERPL-SCNC: 106 MMOL/L (ref 98–107)
CK SERPL-CCNC: 372 U/L (ref 20–200)
CO2 SERPL-SCNC: 21.3 MMOL/L (ref 22–29)
CREAT SERPL-MCNC: 0.98 MG/DL (ref 0.76–1.27)
CRP SERPL-MCNC: <0.3 MG/DL (ref 0–0.5)
DEPRECATED RDW RBC AUTO: 50.1 FL (ref 37–54)
EGFRCR SERPLBLD CKD-EPI 2021: 83.5 ML/MIN/1.73
EOSINOPHIL # BLD AUTO: 0.2 10*3/MM3 (ref 0–0.4)
EOSINOPHIL NFR BLD AUTO: 2.5 % (ref 0.3–6.2)
ERYTHROCYTE [DISTWIDTH] IN BLOOD BY AUTOMATED COUNT: 13.9 % (ref 12.3–15.4)
FLUAV RNA RESP QL NAA+PROBE: NOT DETECTED
FLUBV RNA RESP QL NAA+PROBE: NOT DETECTED
GEN 5 2HR TROPONIN T REFLEX: 8 NG/L
GLOBULIN UR ELPH-MCNC: 2.9 GM/DL
GLUCOSE BLDC GLUCOMTR-MCNC: 168 MG/DL (ref 70–130)
GLUCOSE SERPL-MCNC: 182 MG/DL (ref 65–99)
HBA1C MFR BLD: 5.8 % (ref 4.8–5.6)
HCT VFR BLD AUTO: 49.2 % (ref 37.5–51)
HGB BLD-MCNC: 16.1 G/DL (ref 13–17.7)
HOLD SPECIMEN: NORMAL
HOLD SPECIMEN: NORMAL
IMM GRANULOCYTES # BLD AUTO: 0.07 10*3/MM3 (ref 0–0.05)
IMM GRANULOCYTES NFR BLD AUTO: 0.9 % (ref 0–0.5)
INR PPP: 1 (ref 0.9–1.1)
LIPASE SERPL-CCNC: 22 U/L (ref 13–60)
LYMPHOCYTES # BLD AUTO: 2.71 10*3/MM3 (ref 0.7–3.1)
LYMPHOCYTES NFR BLD AUTO: 34.4 % (ref 19.6–45.3)
MAGNESIUM SERPL-MCNC: 2 MG/DL (ref 1.6–2.4)
MAGNESIUM SERPL-MCNC: 2 MG/DL (ref 1.6–2.4)
MCH RBC QN AUTO: 31.7 PG (ref 26.6–33)
MCHC RBC AUTO-ENTMCNC: 32.7 G/DL (ref 31.5–35.7)
MCV RBC AUTO: 96.9 FL (ref 79–97)
MONOCYTES # BLD AUTO: 0.59 10*3/MM3 (ref 0.1–0.9)
MONOCYTES NFR BLD AUTO: 7.5 % (ref 5–12)
NEUTROPHILS NFR BLD AUTO: 4.26 10*3/MM3 (ref 1.7–7)
NEUTROPHILS NFR BLD AUTO: 54.2 % (ref 42.7–76)
NRBC BLD AUTO-RTO: 0 /100 WBC (ref 0–0.2)
NT-PROBNP SERPL-MCNC: <36 PG/ML (ref 0–900)
PLATELET # BLD AUTO: 152 10*3/MM3 (ref 140–450)
PMV BLD AUTO: 10.9 FL (ref 6–12)
POTASSIUM SERPL-SCNC: 3.6 MMOL/L (ref 3.5–5.2)
PROT SERPL-MCNC: 7.1 G/DL (ref 6–8.5)
PROTHROMBIN TIME: 13.7 SECONDS (ref 12.1–14.7)
QT INTERVAL: 456 MS
QTC INTERVAL: 459 MS
RBC # BLD AUTO: 5.08 10*6/MM3 (ref 4.14–5.8)
SARS-COV-2 RNA RESP QL NAA+PROBE: NOT DETECTED
SODIUM SERPL-SCNC: 142 MMOL/L (ref 136–145)
TROPONIN T DELTA: -1 NG/L
TROPONIN T SERPL HS-MCNC: 9 NG/L
TSH SERPL DL<=0.05 MIU/L-ACNC: 1.55 UIU/ML (ref 0.27–4.2)
WBC NRBC COR # BLD: 7.87 10*3/MM3 (ref 3.4–10.8)
WHOLE BLOOD HOLD COAG: NORMAL

## 2023-06-06 PROCEDURE — G0378 HOSPITAL OBSERVATION PER HR: HCPCS

## 2023-06-06 PROCEDURE — 86140 C-REACTIVE PROTEIN: CPT | Performed by: STUDENT IN AN ORGANIZED HEALTH CARE EDUCATION/TRAINING PROGRAM

## 2023-06-06 PROCEDURE — 99285 EMERGENCY DEPT VISIT HI MDM: CPT

## 2023-06-06 PROCEDURE — 82948 REAGENT STRIP/BLOOD GLUCOSE: CPT

## 2023-06-06 PROCEDURE — 85730 THROMBOPLASTIN TIME PARTIAL: CPT | Performed by: STUDENT IN AN ORGANIZED HEALTH CARE EDUCATION/TRAINING PROGRAM

## 2023-06-06 PROCEDURE — 71045 X-RAY EXAM CHEST 1 VIEW: CPT

## 2023-06-06 PROCEDURE — 36415 COLL VENOUS BLD VENIPUNCTURE: CPT

## 2023-06-06 PROCEDURE — 83880 ASSAY OF NATRIURETIC PEPTIDE: CPT | Performed by: STUDENT IN AN ORGANIZED HEALTH CARE EDUCATION/TRAINING PROGRAM

## 2023-06-06 PROCEDURE — 99223 1ST HOSP IP/OBS HIGH 75: CPT | Performed by: INTERNAL MEDICINE

## 2023-06-06 PROCEDURE — 25010000002 ONDANSETRON PER 1 MG: Performed by: STUDENT IN AN ORGANIZED HEALTH CARE EDUCATION/TRAINING PROGRAM

## 2023-06-06 PROCEDURE — 96372 THER/PROPH/DIAG INJ SC/IM: CPT

## 2023-06-06 PROCEDURE — 87636 SARSCOV2 & INF A&B AMP PRB: CPT | Performed by: INTERNAL MEDICINE

## 2023-06-06 PROCEDURE — 84443 ASSAY THYROID STIM HORMONE: CPT | Performed by: STUDENT IN AN ORGANIZED HEALTH CARE EDUCATION/TRAINING PROGRAM

## 2023-06-06 PROCEDURE — 93880 EXTRACRANIAL BILAT STUDY: CPT

## 2023-06-06 PROCEDURE — 96361 HYDRATE IV INFUSION ADD-ON: CPT

## 2023-06-06 PROCEDURE — 25010000002 ENOXAPARIN PER 10 MG: Performed by: INTERNAL MEDICINE

## 2023-06-06 PROCEDURE — 85610 PROTHROMBIN TIME: CPT | Performed by: STUDENT IN AN ORGANIZED HEALTH CARE EDUCATION/TRAINING PROGRAM

## 2023-06-06 PROCEDURE — 83036 HEMOGLOBIN GLYCOSYLATED A1C: CPT | Performed by: INTERNAL MEDICINE

## 2023-06-06 PROCEDURE — 83735 ASSAY OF MAGNESIUM: CPT | Performed by: STUDENT IN AN ORGANIZED HEALTH CARE EDUCATION/TRAINING PROGRAM

## 2023-06-06 PROCEDURE — 71045 X-RAY EXAM CHEST 1 VIEW: CPT | Performed by: RADIOLOGY

## 2023-06-06 PROCEDURE — 84484 ASSAY OF TROPONIN QUANT: CPT | Performed by: STUDENT IN AN ORGANIZED HEALTH CARE EDUCATION/TRAINING PROGRAM

## 2023-06-06 PROCEDURE — 83690 ASSAY OF LIPASE: CPT | Performed by: STUDENT IN AN ORGANIZED HEALTH CARE EDUCATION/TRAINING PROGRAM

## 2023-06-06 PROCEDURE — 93005 ELECTROCARDIOGRAM TRACING: CPT | Performed by: STUDENT IN AN ORGANIZED HEALTH CARE EDUCATION/TRAINING PROGRAM

## 2023-06-06 PROCEDURE — 96374 THER/PROPH/DIAG INJ IV PUSH: CPT

## 2023-06-06 PROCEDURE — 85025 COMPLETE CBC W/AUTO DIFF WBC: CPT | Performed by: STUDENT IN AN ORGANIZED HEALTH CARE EDUCATION/TRAINING PROGRAM

## 2023-06-06 PROCEDURE — 83735 ASSAY OF MAGNESIUM: CPT | Performed by: INTERNAL MEDICINE

## 2023-06-06 PROCEDURE — 82550 ASSAY OF CK (CPK): CPT | Performed by: STUDENT IN AN ORGANIZED HEALTH CARE EDUCATION/TRAINING PROGRAM

## 2023-06-06 PROCEDURE — 74018 RADEX ABDOMEN 1 VIEW: CPT

## 2023-06-06 PROCEDURE — 93010 ELECTROCARDIOGRAM REPORT: CPT | Performed by: INTERNAL MEDICINE

## 2023-06-06 PROCEDURE — 80053 COMPREHEN METABOLIC PANEL: CPT | Performed by: STUDENT IN AN ORGANIZED HEALTH CARE EDUCATION/TRAINING PROGRAM

## 2023-06-06 RX ORDER — POLYETHYLENE GLYCOL 3350 17 G/17G
17 POWDER, FOR SOLUTION ORAL DAILY PRN
Status: DISCONTINUED | OUTPATIENT
Start: 2023-06-06 | End: 2023-06-08 | Stop reason: HOSPADM

## 2023-06-06 RX ORDER — PANTOPRAZOLE SODIUM 40 MG/1
40 TABLET, DELAYED RELEASE ORAL DAILY
Status: DISCONTINUED | OUTPATIENT
Start: 2023-06-07 | End: 2023-06-08 | Stop reason: HOSPADM

## 2023-06-06 RX ORDER — SODIUM CHLORIDE 0.9 % (FLUSH) 0.9 %
10 SYRINGE (ML) INJECTION AS NEEDED
Status: DISCONTINUED | OUTPATIENT
Start: 2023-06-06 | End: 2023-06-08 | Stop reason: HOSPADM

## 2023-06-06 RX ORDER — SODIUM CHLORIDE 9 MG/ML
40 INJECTION, SOLUTION INTRAVENOUS AS NEEDED
Status: DISCONTINUED | OUTPATIENT
Start: 2023-06-06 | End: 2023-06-08 | Stop reason: HOSPADM

## 2023-06-06 RX ORDER — NITROGLYCERIN 0.4 MG/1
0.4 TABLET SUBLINGUAL
Status: DISCONTINUED | OUTPATIENT
Start: 2023-06-06 | End: 2023-06-08 | Stop reason: HOSPADM

## 2023-06-06 RX ORDER — ASPIRIN 81 MG/1
81 TABLET ORAL DAILY
Status: DISCONTINUED | OUTPATIENT
Start: 2023-06-07 | End: 2023-06-08 | Stop reason: HOSPADM

## 2023-06-06 RX ORDER — ASPIRIN 325 MG
325 TABLET, DELAYED RELEASE (ENTERIC COATED) ORAL ONCE
Status: COMPLETED | OUTPATIENT
Start: 2023-06-06 | End: 2023-06-06

## 2023-06-06 RX ORDER — CALCIUM CARBONATE 500 MG/1
2 TABLET, CHEWABLE ORAL 2 TIMES DAILY PRN
Status: DISCONTINUED | OUTPATIENT
Start: 2023-06-06 | End: 2023-06-08 | Stop reason: HOSPADM

## 2023-06-06 RX ORDER — LOSARTAN POTASSIUM 25 MG/1
25 TABLET ORAL DAILY
Status: DISCONTINUED | OUTPATIENT
Start: 2023-06-06 | End: 2023-06-08 | Stop reason: HOSPADM

## 2023-06-06 RX ORDER — MULTIPLE VITAMINS W/ MINERALS TAB 9MG-400MCG
1 TAB ORAL DAILY
Status: DISCONTINUED | OUTPATIENT
Start: 2023-06-07 | End: 2023-06-08 | Stop reason: HOSPADM

## 2023-06-06 RX ORDER — ASCORBIC ACID 500 MG
500 TABLET ORAL DAILY
Status: DISCONTINUED | OUTPATIENT
Start: 2023-06-07 | End: 2023-06-08 | Stop reason: HOSPADM

## 2023-06-06 RX ORDER — SODIUM CHLORIDE 9 MG/ML
100 INJECTION, SOLUTION INTRAVENOUS CONTINUOUS
Status: DISCONTINUED | OUTPATIENT
Start: 2023-06-06 | End: 2023-06-08 | Stop reason: HOSPADM

## 2023-06-06 RX ORDER — ONDANSETRON 2 MG/ML
4 INJECTION INTRAMUSCULAR; INTRAVENOUS ONCE
Status: COMPLETED | OUTPATIENT
Start: 2023-06-06 | End: 2023-06-06

## 2023-06-06 RX ORDER — VITAMIN E 268 MG
1 CAPSULE ORAL DAILY
Status: DISCONTINUED | OUTPATIENT
Start: 2023-06-07 | End: 2023-06-08 | Stop reason: HOSPADM

## 2023-06-06 RX ORDER — BISACODYL 5 MG/1
5 TABLET, DELAYED RELEASE ORAL DAILY PRN
Status: DISCONTINUED | OUTPATIENT
Start: 2023-06-06 | End: 2023-06-08 | Stop reason: HOSPADM

## 2023-06-06 RX ORDER — CETIRIZINE HYDROCHLORIDE 10 MG/1
10 TABLET ORAL DAILY
Status: DISCONTINUED | OUTPATIENT
Start: 2023-06-07 | End: 2023-06-08 | Stop reason: HOSPADM

## 2023-06-06 RX ORDER — ACETAMINOPHEN 325 MG/1
650 TABLET ORAL EVERY 4 HOURS PRN
Status: DISCONTINUED | OUTPATIENT
Start: 2023-06-06 | End: 2023-06-08 | Stop reason: HOSPADM

## 2023-06-06 RX ORDER — ASPIRIN 81 MG/1
81 TABLET ORAL DAILY
Status: CANCELLED | OUTPATIENT
Start: 2023-06-06

## 2023-06-06 RX ORDER — LANOLIN ALCOHOL/MO/W.PET/CERES
1000 CREAM (GRAM) TOPICAL DAILY
Status: DISCONTINUED | OUTPATIENT
Start: 2023-06-07 | End: 2023-06-08 | Stop reason: HOSPADM

## 2023-06-06 RX ORDER — SODIUM CHLORIDE 0.9 % (FLUSH) 0.9 %
10 SYRINGE (ML) INJECTION EVERY 12 HOURS SCHEDULED
Status: DISCONTINUED | OUTPATIENT
Start: 2023-06-06 | End: 2023-06-08 | Stop reason: HOSPADM

## 2023-06-06 RX ORDER — ONDANSETRON 2 MG/ML
4 INJECTION INTRAMUSCULAR; INTRAVENOUS EVERY 6 HOURS PRN
Status: DISCONTINUED | OUTPATIENT
Start: 2023-06-06 | End: 2023-06-08 | Stop reason: HOSPADM

## 2023-06-06 RX ORDER — BACLOFEN 10 MG/1
5 TABLET ORAL AS NEEDED
Status: DISCONTINUED | OUTPATIENT
Start: 2023-06-06 | End: 2023-06-08 | Stop reason: HOSPADM

## 2023-06-06 RX ORDER — BISACODYL 10 MG
10 SUPPOSITORY, RECTAL RECTAL DAILY PRN
Status: DISCONTINUED | OUTPATIENT
Start: 2023-06-06 | End: 2023-06-08 | Stop reason: HOSPADM

## 2023-06-06 RX ORDER — AMOXICILLIN 250 MG
2 CAPSULE ORAL 2 TIMES DAILY
Status: DISCONTINUED | OUTPATIENT
Start: 2023-06-06 | End: 2023-06-08 | Stop reason: HOSPADM

## 2023-06-06 RX ORDER — ENOXAPARIN SODIUM 100 MG/ML
40 INJECTION SUBCUTANEOUS NIGHTLY
Status: DISCONTINUED | OUTPATIENT
Start: 2023-06-06 | End: 2023-06-08 | Stop reason: HOSPADM

## 2023-06-06 RX ADMIN — SODIUM CHLORIDE 1000 ML: 9 INJECTION, SOLUTION INTRAVENOUS at 10:33

## 2023-06-06 RX ADMIN — ONDANSETRON 4 MG: 2 INJECTION INTRAMUSCULAR; INTRAVENOUS at 10:34

## 2023-06-06 RX ADMIN — Medication 10 ML: at 20:12

## 2023-06-06 RX ADMIN — SODIUM CHLORIDE 100 ML/HR: 9 INJECTION, SOLUTION INTRAVENOUS at 17:40

## 2023-06-06 RX ADMIN — DOCUSATE SODIUM 50 MG AND SENNOSIDES 8.6 MG 2 TABLET: 8.6; 5 TABLET, FILM COATED ORAL at 20:11

## 2023-06-06 RX ADMIN — ENOXAPARIN SODIUM 40 MG: 40 INJECTION SUBCUTANEOUS at 20:12

## 2023-06-06 RX ADMIN — LOSARTAN POTASSIUM 25 MG: 25 TABLET, FILM COATED ORAL at 20:11

## 2023-06-06 RX ADMIN — ASPIRIN 325 MG: 325 TABLET, COATED ORAL at 18:46

## 2023-06-06 NOTE — ED NOTES
Pt attempted to ambulated out of the stretcher, pt placed one leg off the bed and stated he was unable to get up due to weakness/fatigue.

## 2023-06-06 NOTE — ED NOTES
Received updated from pts wife at this time who states pt did take X4 100mg viagra this am at approx 0735, provider made aware.

## 2023-06-06 NOTE — PLAN OF CARE
Goal Outcome Evaluation:  Pt transferred from ED this shift. No complaints or requests at this time. VSS. Will continue plan of care.

## 2023-06-06 NOTE — ED PROVIDER NOTES
[facility]  emergency department encounter        Pt Name: Ron Villa  MRN: [id number]  Birthdate 1955  Date of evaluation: 6/6/2023    Chief Complaint   Patient presents with    Vomiting    Weakness - Generalized             HISTORY OF PRESENT ILLNESS:   Ron Villa is a 68 y.o. male PMH GERD, DANNY, arthritis, severe extort stenosis s/p tissue TAVR    Patient reports he felt fine when he got up this morning.  He was doing some exercises for his back when he developed acute onset diaphoresis, generalized weakness, dizziness, nausea and nonbloody nonbilious emesis.  Patient reports patient believes his nausea is fully resolved by time of arrival here.  He endorses mild headache.  He denies any associated chest pain, palpitations, shortness of breath with the episode.  Denies fever cough congestion rhinorrhea.  He denies abdominal pain constipation diarrhea dysuria.  Denies numbness tingling leg swelling.            PCP: Geovanni Greene MD          REVIEW OF SYSTEMS:     Review of Systems   Constitutional:  Positive for diaphoresis. Negative for fever.   HENT:  Negative for congestion and rhinorrhea.    Eyes:  Negative for visual disturbance.   Respiratory:  Negative for cough and shortness of breath.    Cardiovascular:  Negative for chest pain.   Gastrointestinal:  Positive for nausea and vomiting. Negative for abdominal pain, constipation and diarrhea.   Genitourinary:  Negative for dysuria.   Musculoskeletal:  Negative for myalgias.   Skin:  Negative for rash.   Neurological:  Positive for headaches.   Psychiatric/Behavioral:  Negative for confusion.      Review of systems otherwise as per HPI.          PREVIOUS HISTORY:         Past Medical History:   Diagnosis Date    Aortic stenosis, severe 07/2021    Arthritis     Cancer     COVID-19 01/2022    GERD (gastroesophageal reflux disease)     Sleep apnea     Wears glasses            Past Surgical History:   Procedure Laterality Date    AORTIC  VALVE REPAIR/REPLACEMENT N/A 2021    Procedure: TRANSCATHETER AORTIC VALVE REPLACEMENT;  Surgeon: Prieto Tripp MD;  Location: [facility];  Service: Cardiothoracic;  Laterality: N/A;  flouro-6 mins 18 sec  dose- 370mgy  contrast-85 ml isovue 370      AORTIC VALVE REPAIR/REPLACEMENT N/A 2021    Procedure: Transcatheter Aortic Valve Replacement;  Surgeon: Dorie White MD;  Location: [facility];  Service: Cardiovascular;  Laterality: N/A;    CARDIAC CATHETERIZATION      CARDIAC CATHETERIZATION Left 2021    Procedure: Right and Left Heart Cath--C-19 ORDER FAXED TO  [facility];  Surgeon: Power Burgos MD;  Location: [facility] CATH INVASIVE LOCATION;  Service: Cardiology;  Laterality: Left;  Week of     COLONOSCOPY      HERNIA REPAIR      TRANSESOPHAGEAL ECHOCARDIOGRAM (TERRI) N/A 2021    Procedure: TRANSESOPHAGEAL ECHOCARDIOGRAM WITH ANESTHESIA;  Surgeon: Prieto Tripp MD;  Location: [facility];  Service: Cardiothoracic;  Laterality: N/A;           Social History     Socioeconomic History    Marital status:      Spouse name: Livia    Years of education: HS   Tobacco Use    Smoking status: Former     Packs/day: 1.00     Years: 6.00     Pack years: 6.00     Types: Cigarettes     Quit date:      Years since quittin.4    Smokeless tobacco: Current     Types: Chew   Vaping Use    Vaping Use: Never used   Substance and Sexual Activity    Alcohol use: Yes     Alcohol/week: 2.0 standard drinks     Types: 2 Cans of beer per week     Comment: daily    Drug use: Never    Sexual activity: Defer           Family History   Problem Relation Age of Onset    Heart disease Mother     Heart attack Father     No Known Problems Sister     No Known Problems Brother          Current Outpatient Medications   Medication Instructions    aspirin 81 mg, Oral, Daily    Baclofen 5 MG tablet 1 tablet, Oral, As Needed    fexofenadine (ALLEGRA) 180 mg, Oral, Daily    losartan  "(COZAAR) 25 mg, Oral, Daily    Misc Natural Products (OSTEO BI-FLEX TRIPLE STRENGTH PO) 1 tablet, Oral, Daily    multivitamin with minerals tablet tablet 1 tablet, Oral, Daily    Omega-3 Fatty Acids (FISH OIL PO) 1,400 mg, Oral, Daily With Breakfast    pantoprazole (PROTONIX) 40 mg, Oral, Daily, \    sildenafil (VIAGRA) 100 MG tablet Oral, As Needed    vitamin B-12 (CYANOCOBALAMIN) 1,000 mcg, Oral, Daily    vitamin C (ASCORBIC ACID) 500 mg, Oral, Daily    vitamin D3 1,000 Units, Oral, Daily    Vitamin E 180 MG (400 UNIT) capsule capsule 1 capsule, Oral, Daily         Allergies:  Patient has no known allergies.          PHYSICAL EXAM:     Patient Vitals for the past 24 hrs:   BP Temp Temp src Pulse Resp SpO2 Height Weight   06/06/23 1453 -- 97.5 °F (36.4 °C) Oral -- -- -- -- --   06/06/23 1430 143/73 -- -- 76 -- 94 % -- --   06/06/23 1400 154/70 -- -- 72 -- 91 % -- --   06/06/23 1345 -- 94 °F (34.4 °C) Oral -- -- -- -- --   06/06/23 1330 148/74 -- -- 69 -- 92 % -- --   06/06/23 1300 158/74 -- -- 66 -- 98 % -- --   06/06/23 1230 149/79 -- -- 64 -- 96 % -- --   06/06/23 1200 154/75 -- -- 67 -- 93 % -- --   06/06/23 1130 152/81 -- -- 58 -- 97 % -- --   06/06/23 1100 148/74 -- -- 63 -- 99 % -- --   06/06/23 1037 141/74 -- -- 61 -- 95 % -- --   06/06/23 1030 151/70 -- -- 61 -- 93 % -- --   06/06/23 1015 150/70 -- -- 60 -- 91 % -- --   06/06/23 1004 147/73 94.4 °F (34.7 °C) Oral 61 16 95 % 190.5 cm (75\") 118 kg (260 lb)       Physical Exam  Vitals and nursing note reviewed.   Constitutional:       General: He is not in acute distress.     Appearance: Normal appearance.   HENT:      Head: Normocephalic and atraumatic.   Eyes:      Extraocular Movements: Extraocular movements intact.      Conjunctiva/sclera: Conjunctivae normal.   Cardiovascular:      Rate and Rhythm: Normal rate and regular rhythm.   Pulmonary:      Effort: Pulmonary effort is normal. No respiratory distress.      Breath sounds: No stridor. No wheezing, " rhonchi or rales.   Abdominal:      General: Abdomen is flat. There is no distension.      Tenderness: There is no abdominal tenderness. There is no guarding or rebound.   Musculoskeletal:         General: No deformity. Normal range of motion.      Cervical back: Normal range of motion. No rigidity.      Right lower leg: No edema.      Left lower leg: No edema.   Skin:     Coloration: Skin is not jaundiced.      Findings: No rash.   Neurological:      General: No focal deficit present.      Mental Status: He is alert and oriented to person, place, and time.   Psychiatric:         Mood and Affect: Mood normal.         Behavior: Behavior normal.           COMPLETED DIAGNOSTIC STUDIES AND INTERVENTIONS:     Lab Results (last 24 hours)       Procedure Component Value Units Date/Time    POC Glucose Once [[id number]]  (Abnormal) Collected: 06/06/23 0956    Specimen: Blood Updated: 06/06/23 1012     Glucose 168 mg/dL      Comment: Meter: [id number] : [id number] [facility]       CBC & Differential [[id number]]  (Abnormal) Collected: 06/06/23 1012    Specimen: Blood Updated: 06/06/23 1050    Narrative:      The following orders were created for panel order CBC & Differential.  Procedure                               Abnormality         Status                     ---------                               -----------         ------                     CBC Auto Differential[[id number]]        Abnormal            Final result               Scan Slide[[id number]]                                                                    Please view results for these tests on the individual orders.    Comprehensive Metabolic Panel [[id number]]  (Abnormal) Collected: 06/06/23 1012    Specimen: Blood Updated: 06/06/23 1040     Glucose 182 mg/dL      BUN 17 mg/dL      Creatinine 0.98 mg/dL      Sodium 142 mmol/L      Potassium 3.6 mmol/L      Chloride 106 mmol/L      CO2 21.3 mmol/L      Calcium 8.8 mg/dL      Total Protein 7.1 g/dL       Albumin 4.2 g/dL      ALT (SGPT) 35 U/L      AST (SGOT) 29 U/L      Alkaline Phosphatase 51 U/L      Total Bilirubin 0.6 mg/dL      Globulin 2.9 gm/dL      A/G Ratio 1.4 g/dL      BUN/Creatinine Ratio 17.3     Anion Gap 14.7 mmol/L      eGFR 83.5 mL/min/1.73     Narrative:      GFR Normal >60  Chronic Kidney Disease <60  Kidney Failure <15      Lipase [[id number]]  (Normal) Collected: 06/06/23 1012    Specimen: Blood Updated: 06/06/23 1040     Lipase 22 U/L     Magnesium [[id number]]  (Normal) Collected: 06/06/23 1012    Specimen: Blood Updated: 06/06/23 1040     Magnesium 2.0 mg/dL     CBC Auto Differential [[id number]]  (Abnormal) Collected: 06/06/23 1012    Specimen: Blood Updated: 06/06/23 1050     WBC 7.87 10*3/mm3      RBC 5.08 10*6/mm3      Hemoglobin 16.1 g/dL      Hematocrit 49.2 %      MCV 96.9 fL      MCH 31.7 pg      MCHC 32.7 g/dL      RDW 13.9 %      RDW-SD 50.1 fl      MPV 10.9 fL      Platelets 152 10*3/mm3      Neutrophil % 54.2 %      Lymphocyte % 34.4 %      Monocyte % 7.5 %      Eosinophil % 2.5 %      Basophil % 0.5 %      Immature Grans % 0.9 %      Neutrophils, Absolute 4.26 10*3/mm3      Lymphocytes, Absolute 2.71 10*3/mm3      Monocytes, Absolute 0.59 10*3/mm3      Eosinophils, Absolute 0.20 10*3/mm3      Basophils, Absolute 0.04 10*3/mm3      Immature Grans, Absolute 0.07 10*3/mm3      nRBC 0.0 /100 WBC     TSH [[id number]]  (Normal) Collected: 06/06/23 1012    Specimen: Blood Updated: 06/06/23 1052     TSH 1.550 uIU/mL     BNP [[id number]]  (Normal) Collected: 06/06/23 1012    Specimen: Blood Updated: 06/06/23 1116     proBNP <36.0 pg/mL     Narrative:      Among patients with dyspnea, NT-proBNP is highly sensitive for the detection of acute congestive heart failure. In addition NT-proBNP of <300 pg/ml effectively rules out acute congestive heart failure with 99% negative predictive value.    Results may be falsely decreased if patient taking Biotin.      High Sensitivity Troponin T [[id number]]   (Normal) Collected: 06/06/23 1012    Specimen: Blood Updated: 06/06/23 1115     HS Troponin T 9 ng/L     Narrative:      High Sensitive Troponin T Reference Range:  <10.0 ng/L- Negative Female for AMI  <15.0 ng/L- Negative Male for AMI  >=10 - Abnormal Female indicating possible myocardial injury.  >=15 - Abnormal Male indicating possible myocardial injury.   Clinicians would have to utilize clinical acumen, EKG, Troponin, and serial changes to determine if it is an Acute Myocardial Infarction or myocardial injury due to an underlying chronic condition.         C-reactive Protein [[id number]]  (Normal) Collected: 06/06/23 1012    Specimen: Blood Updated: 06/06/23 1112     C-Reactive Protein <0.30 mg/dL     Protime-INR [[id number]]  (Normal) Collected: 06/06/23 1012    Specimen: Blood Updated: 06/06/23 1113     Protime 13.7 Seconds      INR 1.00    Narrative:      Suggested INR therapeutic range for stable oral anticoagulant therapy:    Low Intensity therapy:   1.5-2.0  Moderate Intensity therapy:   2.0-3.0  High Intensity therapy:   2.5-4.0    aPTT [[id number]]  (Normal) Collected: 06/06/23 1012    Specimen: Blood Updated: 06/06/23 1113     PTT 26.5 seconds     Narrative:      PTT Heparin Therapeutic Range:  59 - 95 seconds      High Sensitivity Troponin T 2Hr [[id number]]  (Normal) Collected: 06/06/23 1322    Specimen: Blood from Arm, Right Updated: 06/06/23 1351     HS Troponin T 8 ng/L      Troponin T Delta -1 ng/L     Narrative:      High Sensitive Troponin T Reference Range:  <10.0 ng/L- Negative Female for AMI  <15.0 ng/L- Negative Male for AMI  >=10 - Abnormal Female indicating possible myocardial injury.  >=15 - Abnormal Male indicating possible myocardial injury.   Clinicians would have to utilize clinical acumen, EKG, Troponin, and serial changes to determine if it is an Acute Myocardial Infarction or myocardial injury due to an underlying chronic condition.                   XR Chest 1 View   Final Result      Unremarkable exam. No acute cardiopulmonary findings identified.       This report was finalized on 6/6/2023 11:03 AM by Dr. Chi Gill MD.                New Medications Ordered This Visit   Medications    ondansetron (ZOFRAN) injection 4 mg    sodium chloride 0.9 % flush 10 mL    sodium chloride 0.9 % bolus 1,000 mL         Procedures            MEDICAL DECISION-MAKING AND ED COURSE:     ED Course as of 06/06/23 1544   Tue Jun 06, 2023   1005 EKG at 9:58 AM NSR 61 bpm, , , QTc 459, regular axis, no STEMI. [KP]   1028 68-year-old male with a history of tissue TAVR 2 years ago presents for sudden onset diaphoresis generalized weakness nausea and vomiting.  Diuresis or vomiting at time of arrival.  Patient denies fever cough and other infectious symptoms.  Denies chest pain shortness of breath or other symptoms that would be typical of ACS.  No tachycardia, leg swelling or pain.  No clear indication as of cause.  EKG reassuring.  CXR and labs pending. [KP]   1144 XR Chest 1 View  Unremarkable exam. No acute cardiopulmonary findings identified. [KP]   1144 Glucose(!): 182 [KP]   1144 Creatinine: 0.98 [KP]   1144 Potassium: 3.6 [KP]   1144 Magnesium: 2.0 [KP]   1144 TSH Baseline: 1.550 [KP]   1144 Hemoglobin: 16.1 [KP]   1144 WBC: 7.87 [KP]   1144 C-Reactive Protein: <0.30 [KP]   1144 proBNP: <36.0 [KP]   1144 HS Troponin T: 9 [KP]   1338 Wife reports patient took Viagra 400 mg this morning. [KP]   1409 HS Troponin T: 8 [KP]   1409 Troponin T Delta: -1 [KP]   1438 Patient reports he took Viagra at 7:35 AM and symptom onset around 9:30 AM.  Reports that he is taken similar 400 mg dose twice a week for multiple weeks without similar episode. [KP]   1523 Patient unable to ambulate with staff.  Endorses feeling too weak.  Weakness is generalized not focal to the lower extremities.  Patient has minimal bilateral patellar reflexes, sensation intact.  He denies any infectious syndromes over the past few  months.  I have considered Guillain-Barré but symptom onset not consistent.  Still suspect symptoms likely secondary to Viagra use.   []   1543 Case discussed with and admitted to hospitalist Dr. Aj. []      ED Course User Index  [] Chi Amaral MD       ?      FINAL IMPRESSION:       1. Generalized weakness    2. Misuse of medication         The complaints listed here are new problems to this examiner.       Medication List      No changes were made to your prescriptions during this visit.         FOLLOW-UP  No follow-up provider specified.    DISPOSITION  ED Disposition       ED Disposition   Intended Admit    Condition   --    Comment   --                   Please note that portions of this note were completed with a voice recognition program.      Chi Amaral MD  15:44 EDT  6/6/2023               Chi Amaral MD  06/06/23 1544

## 2023-06-06 NOTE — H&P
[facility] HOSPITALIST HISTORY AND PHYSICAL      Admit Date: 6/6/2023       Chief complaint Nausea, vomiting, generalized weakness    Subjective     Edbernardino Villa is a 68 y.o. male who presented to the ED at [facility] with CC of nausea, vomiting and generalized weakness. States he felt okay this morning when he woke up this AM. States he was doing some back exercises when he experienced onset of diaphoresis, dizziness, generalized weakness and nausea with episode of vomiting. States he felt as if he may pass out but denies any syncope. Reports he took some Viagra this AM a few hours prior to this episode. Reports he has been taking Viagra regularly with no reported events and denies taking any extra pills today. Denies any CP during this episode but does report a few episodes of CP this week. Reports episodes of stabbing chest pain at rest with associated dyspnea. States the pain has resolved on its own. No reported recent sick contacts. Denies any abdominal pain, dysuria or diarrhea.     In the ED, he was afebrile and hemodynamically stable. EKG revealed NSR with no acute ischemic changes. Troponin's negative x 2. CPK mildly elevated. Routine labs revealed low normal K+, mild hyperglycemia, normal TSH and normal CBC. CXR did not reveal any acute cardiopulmonary findings. He was given Zofran and IVF's.       History  Past Medical History:   Diagnosis Date    Aortic stenosis, severe 07/2021    Arthritis     Cancer     COVID-19 01/2022    GERD (gastroesophageal reflux disease)     Sleep apnea     Wears glasses      Past Surgical History:   Procedure Laterality Date    AORTIC VALVE REPAIR/REPLACEMENT N/A 11/8/2021    Procedure: TRANSCATHETER AORTIC VALVE REPLACEMENT;  Surgeon: Prieto Tripp MD;  Location: [facility];  Service: Cardiothoracic;  Laterality: N/A;  flouro-6 mins 18 sec  dose- 370mgy  contrast-85 ml isovue 370      AORTIC VALVE REPAIR/REPLACEMENT N/A 11/8/2021    Procedure:  Transcatheter Aortic Valve Replacement;  Surgeon: Dorie White MD;  Location: [facility];  Service: Cardiovascular;  Laterality: N/A;    CARDIAC CATHETERIZATION      CARDIAC CATHETERIZATION Left 2021    Procedure: Right and Left Heart Cath--C-19 ORDER FAXED TO  [facility];  Surgeon: Power Burgos MD;  Location: [facility] CATH INVASIVE LOCATION;  Service: Cardiology;  Laterality: Left;  Week of     COLONOSCOPY      HERNIA REPAIR      TRANSESOPHAGEAL ECHOCARDIOGRAM (TERRI) N/A 2021    Procedure: TRANSESOPHAGEAL ECHOCARDIOGRAM WITH ANESTHESIA;  Surgeon: Prieto Tripp MD;  Location: [facility];  Service: Cardiothoracic;  Laterality: N/A;     Family History   Problem Relation Age of Onset    Heart disease Mother     Heart attack Father     No Known Problems Sister     No Known Problems Brother      Social History     Tobacco Use    Smoking status: Former     Packs/day: 1.00     Years: 6.00     Pack years: 6.00     Types: Cigarettes     Quit date:      Years since quittin.4    Smokeless tobacco: Current     Types: Chew   Vaping Use    Vaping Use: Never used   Substance Use Topics    Alcohol use: Yes     Alcohol/week: 2.0 standard drinks     Types: 2 Cans of beer per week     Comment: daily    Drug use: Never     Medications Prior to Admission   Medication Sig Dispense Refill Last Dose    aspirin 81 MG EC tablet Take 1 tablet by mouth Daily.   2023    fexofenadine (ALLEGRA) 180 MG tablet Take 1 tablet by mouth Daily.   2023    Misc Natural Products (OSTEO BI-FLEX TRIPLE STRENGTH PO) Take 1 tablet by mouth Daily.   2023    multivitamin with minerals tablet tablet Take 1 tablet by mouth Daily.   2023    pantoprazole (PROTONIX) 40 MG EC tablet Take 1 tablet by mouth Daily. \   2023    sildenafil (VIAGRA) 100 MG tablet Take  by mouth As Needed.   2023    vitamin B-12 (CYANOCOBALAMIN) 1000 MCG tablet Take 1 tablet by mouth Daily.   2023    vitamin C  (ASCORBIC ACID) 250 MG tablet Take 2 tablets by mouth Daily.   6/6/2023    vitamin D3 125 MCG (5000 UT) capsule capsule Take 1,000 Units by mouth Daily.   6/6/2023    Vitamin E 180 MG (400 UNIT) capsule capsule Take 1 capsule by mouth Daily.   6/6/2023    Baclofen 5 MG tablet Take 1 tablet by mouth As Needed.   Unknown    losartan (Cozaar) 25 MG tablet Take 1 tablet by mouth Daily. 30 tablet 11 Unknown    Omega-3 Fatty Acids (FISH OIL PO) Take 1,400 mg by mouth Daily With Breakfast.        Allergies:  Patient has no known allergies.      Review of Systems    Review of Systems   Constitutional:  Positive for activity change, chills, diaphoresis and fatigue. Negative for fever.   HENT:  Negative for hearing loss, trouble swallowing and voice change.    Eyes:  Positive for visual disturbance. Negative for pain.   Respiratory:  Positive for shortness of breath. Negative for cough and choking.    Cardiovascular:  Positive for chest pain. Negative for palpitations and leg swelling.   Gastrointestinal:  Positive for constipation, nausea and vomiting. Negative for abdominal pain and diarrhea.   Endocrine: Negative for polydipsia, polyphagia and polyuria.   Genitourinary:  Negative for decreased urine volume, difficulty urinating, dysuria, flank pain and hematuria.   Musculoskeletal:  Negative for gait problem and neck stiffness.   Skin:  Negative for rash and wound.   Neurological:  Positive for dizziness, weakness, light-headedness and headaches. Negative for syncope.   Psychiatric/Behavioral:  Negative for agitation, confusion and hallucinations.       Objective     Vital Signs  Temp:  [94 °F (34.4 °C)-97.8 °F (36.6 °C)] 97.6 °F (36.4 °C)  Heart Rate:  [58-80] 79  Resp:  [16] 16  BP: (141-163)/(70-82) 146/80    Physical Exam:  General:    Awake, alert, in no acute distress   Head:    Normocephalic, atraumatic   Eyes:           PERRLA, EOMI. Conjunctivae and sclerae normal. No icterus or pallor.   Ears:    Ears appear  intact with no abnormalities noted.   Throat:   No oral lesions or thrush. Oral mucosa moist   Heart:      Normal S1 and S2. Regular rate and rhythm.    Lungs:     Respirations regular, even and unlabored. Lungs clear to auscultation B/L. No wheezes, rales or rhonchi.   Abdomen:   Soft and nontender. No guarding, rebound tenderness or  organomegaly noted. Bowel sounds present x 4.   MS:   Muscular strength intact and equal B/L. No joint swelling, deformity, or tenderness.   Extremities:  No clubbing, cyanosis or edema noted. Moves UE and LE equally B/L.   Pulses:   Pulses palpable and equal bilaterally.   Skin:   No bleeding, bruising or rash.   Lymph nodes:   No palpable adenopathy   Neurologic:   Cranial nerves 2 - 12 grossly intact. Sensation intact.        Results Review:     I reviewed the patient's new imaging results and agree with the interpretation.  I reviewed the patient's other test results and agree with the interpretation.    Results from last 7 days   Lab Units 06/06/23  1012   WBC 10*3/mm3 7.87   HEMOGLOBIN g/dL 16.1   PLATELETS 10*3/mm3 152     Results from last 7 days   Lab Units 06/06/23  1012   SODIUM mmol/L 142   POTASSIUM mmol/L 3.6   CHLORIDE mmol/L 106   CO2 mmol/L 21.3*   BUN mg/dL 17   CREATININE mg/dL 0.98   CALCIUM mg/dL 8.8   GLUCOSE mg/dL 182*     Results from last 7 days   Lab Units 06/06/23  1012   BILIRUBIN mg/dL 0.6   ALK PHOS U/L 51   AST (SGOT) U/L 29   ALT (SGPT) U/L 35     Results from last 7 days   Lab Units 06/06/23  1012   MAGNESIUM mg/dL 2.0     Results from last 7 days   Lab Units 06/06/23  1012   INR  1.00     Results from last 7 days   Lab Units 06/06/23  1322 06/06/23  1012   CK TOTAL U/L 372*  --    HSTROP T ng/L 8 9       Imaging Results (Last 24 Hours)       Procedure Component Value Units Date/Time    XR Chest 1 View [[id number]] Collected: 06/06/23 1103     Updated: 06/06/23 1105    Narrative:      EXAM:    XR Chest, 1 View     EXAM DATE:    6/6/2023 10:41 AM      CLINICAL HISTORY:    weakness, diaphoretic     TECHNIQUE:    Frontal view of the chest.     COMPARISON:    12/06/2021     FINDINGS:    Lungs and pleural spaces:  Unremarkable.  No consolidation.  No  pneumothorax.    Heart:  Unremarkable.  No cardiomegaly.    Mediastinum:  Unremarkable.    Bones/joints:  Unremarkable.       Impression:        Unremarkable exam. No acute cardiopulmonary findings identified.     This report was finalized on 6/6/2023 11:03 AM by Dr. Chi Gill MD.                   Assessment & Plan   Presyncope: No acute ischemic changes on EKG with negative troponin's. TSH is normal. Check CT head, carotid US and echo. Order orthostatic VS qshift. Start maintenance IVF's. Monitor on telemetry.     Chest pain: No acute ischemic changes on EKG. Troponin's are negative. Order a dose of full dose ASA. Check HgbA1c and lipid panel. Order echo and plan for stress test in the AM.     Generalized weakness: No gross focal deficits on exam. Order CT head as above. Maintenance IVF's. Consult PT/OT.     Nausea and vomiting: Unclear etiology. Pt denies any known sick contacts. Will check KUB as pt reports constipation. Order IVF's and PRN antiemetics.     Hx of severe AS s/p TAVR: Order echo as above.     Hyperglycemia: Check HgbA1c.     Borderline hypokalemia: Check Mg. Order electrolyte replacement protocols.     DVT PPX: SQ Lovenox     I discussed the patient's findings and my recommendations with patient and family.       Je Aj,   06/06/23  17:48 EDT

## 2023-06-07 ENCOUNTER — APPOINTMENT (OUTPATIENT)
Dept: NUCLEAR MEDICINE | Facility: HOSPITAL | Age: 68
End: 2023-06-07
Payer: MEDICARE

## 2023-06-07 ENCOUNTER — APPOINTMENT (OUTPATIENT)
Dept: CT IMAGING | Facility: HOSPITAL | Age: 68
End: 2023-06-07
Payer: MEDICARE

## 2023-06-07 ENCOUNTER — APPOINTMENT (OUTPATIENT)
Dept: CARDIOLOGY | Facility: HOSPITAL | Age: 68
End: 2023-06-07
Payer: MEDICARE

## 2023-06-07 ENCOUNTER — APPOINTMENT (OUTPATIENT)
Dept: MRI IMAGING | Facility: HOSPITAL | Age: 68
End: 2023-06-07
Payer: MEDICARE

## 2023-06-07 LAB
ALBUMIN SERPL-MCNC: 3.6 G/DL (ref 3.5–5.2)
ALBUMIN/GLOB SERPL: 1.3 G/DL
ALP SERPL-CCNC: 46 U/L (ref 39–117)
ALT SERPL W P-5'-P-CCNC: 31 U/L (ref 1–41)
ANION GAP SERPL CALCULATED.3IONS-SCNC: 9.1 MMOL/L (ref 5–15)
AST SERPL-CCNC: 29 U/L (ref 1–40)
BASOPHILS # BLD AUTO: 0.01 10*3/MM3 (ref 0–0.2)
BASOPHILS NFR BLD AUTO: 0.1 % (ref 0–1.5)
BH CV ECHO MEAS - AO MAX PG: 33.5 MMHG
BH CV ECHO MEAS - AO MEAN PG: 18.2 MMHG
BH CV ECHO MEAS - AO ROOT DIAM: 2.7 CM
BH CV ECHO MEAS - AO V2 MAX: 289.2 CM/SEC
BH CV ECHO MEAS - AO V2 VTI: 52.9 CM
BH CV ECHO MEAS - AVA(I,D): 1.84 CM2
BH CV ECHO MEAS - EDV(CUBED): 105.8 ML
BH CV ECHO MEAS - EDV(MOD-SP4): 96.2 ML
BH CV ECHO MEAS - EF(MOD-BP): 60 %
BH CV ECHO MEAS - EF(MOD-SP4): 60 %
BH CV ECHO MEAS - ESV(CUBED): 35.6 ML
BH CV ECHO MEAS - ESV(MOD-SP4): 38.5 ML
BH CV ECHO MEAS - FS: 30.4 %
BH CV ECHO MEAS - IVS/LVPW: 1.13 CM
BH CV ECHO MEAS - IVSD: 1.2 CM
BH CV ECHO MEAS - LA DIMENSION: 3.4 CM
BH CV ECHO MEAS - LAT PEAK E' VEL: 5.6 CM/SEC
BH CV ECHO MEAS - LV DIASTOLIC VOL/BSA (35-75): 39.8 CM2
BH CV ECHO MEAS - LV MASS(C)D: 196.7 GRAMS
BH CV ECHO MEAS - LV MAX PG: 9.7 MMHG
BH CV ECHO MEAS - LV MEAN PG: 5.6 MMHG
BH CV ECHO MEAS - LV SYSTOLIC VOL/BSA (12-30): 15.9 CM2
BH CV ECHO MEAS - LV V1 MAX: 153 CM/SEC
BH CV ECHO MEAS - LV V1 VTI: 31 CM
BH CV ECHO MEAS - LVIDD: 4.7 CM
BH CV ECHO MEAS - LVIDS: 3.3 CM
BH CV ECHO MEAS - LVOT AREA: 3.1 CM2
BH CV ECHO MEAS - LVOT DIAM: 2 CM
BH CV ECHO MEAS - LVPWD: 1.06 CM
BH CV ECHO MEAS - MED PEAK E' VEL: 8.1 CM/SEC
BH CV ECHO MEAS - MV A MAX VEL: 118.5 CM/SEC
BH CV ECHO MEAS - MV E MAX VEL: 92.9 CM/SEC
BH CV ECHO MEAS - MV E/A: 0.78
BH CV ECHO MEAS - PA ACC TIME: 0.11 SEC
BH CV ECHO MEAS - PA PR(ACCEL): 27.5 MMHG
BH CV ECHO MEAS - SI(MOD-SP4): 23.9 ML/M2
BH CV ECHO MEAS - SV(LVOT): 97.3 ML
BH CV ECHO MEAS - SV(MOD-SP4): 57.7 ML
BH CV ECHO MEASUREMENTS AVERAGE E/E' RATIO: 13.56
BH CV NUCLEAR PRIOR STUDY: 3
BH CV REST NUCLEAR ISOTOPE DOSE: 10.2 MCI
BH CV STRESS BP STAGE 1: NORMAL
BH CV STRESS COMMENTS STAGE 1: NORMAL
BH CV STRESS DOSE REGADENOSON STAGE 1: 0.4
BH CV STRESS DURATION MIN STAGE 1: 0
BH CV STRESS DURATION SEC STAGE 1: 10
BH CV STRESS HR STAGE 1: 94
BH CV STRESS NUCLEAR ISOTOPE DOSE: 30.2 MCI
BH CV STRESS PROTOCOL 1: NORMAL
BH CV STRESS RECOVERY BP: NORMAL MMHG
BH CV STRESS RECOVERY HR: 81 BPM
BH CV STRESS STAGE 1: 1
BILIRUB SERPL-MCNC: 0.5 MG/DL (ref 0–1.2)
BUN SERPL-MCNC: 14 MG/DL (ref 8–23)
BUN/CREAT SERPL: 19.2 (ref 7–25)
CALCIUM SPEC-SCNC: 8.6 MG/DL (ref 8.6–10.5)
CHLORIDE SERPL-SCNC: 106 MMOL/L (ref 98–107)
CHOLEST SERPL-MCNC: 142 MG/DL (ref 0–200)
CO2 SERPL-SCNC: 21.9 MMOL/L (ref 22–29)
CREAT SERPL-MCNC: 0.73 MG/DL (ref 0.76–1.27)
DEPRECATED RDW RBC AUTO: 52.7 FL (ref 37–54)
EGFRCR SERPLBLD CKD-EPI 2021: 98.5 ML/MIN/1.73
EOSINOPHIL # BLD AUTO: 0 10*3/MM3 (ref 0–0.4)
EOSINOPHIL NFR BLD AUTO: 0 % (ref 0.3–6.2)
ERYTHROCYTE [DISTWIDTH] IN BLOOD BY AUTOMATED COUNT: 14.3 % (ref 12.3–15.4)
GLOBULIN UR ELPH-MCNC: 2.7 GM/DL
GLUCOSE SERPL-MCNC: 121 MG/DL (ref 65–99)
HCT VFR BLD AUTO: 47.1 % (ref 37.5–51)
HDLC SERPL-MCNC: 40 MG/DL (ref 40–60)
HGB BLD-MCNC: 15.1 G/DL (ref 13–17.7)
IMM GRANULOCYTES # BLD AUTO: 0.07 10*3/MM3 (ref 0–0.05)
IMM GRANULOCYTES NFR BLD AUTO: 0.6 % (ref 0–0.5)
LDLC SERPL CALC-MCNC: 90 MG/DL (ref 0–100)
LDLC/HDLC SERPL: 2.25 {RATIO}
LV EF NUC BP: 68 %
LYMPHOCYTES # BLD AUTO: 1.48 10*3/MM3 (ref 0.7–3.1)
LYMPHOCYTES NFR BLD AUTO: 13.4 % (ref 19.6–45.3)
MAGNESIUM SERPL-MCNC: 2 MG/DL (ref 1.6–2.4)
MAXIMAL PREDICTED HEART RATE: 151 BPM
MCH RBC QN AUTO: 31.6 PG (ref 26.6–33)
MCHC RBC AUTO-ENTMCNC: 32.1 G/DL (ref 31.5–35.7)
MCV RBC AUTO: 98.5 FL (ref 79–97)
MONOCYTES # BLD AUTO: 0.84 10*3/MM3 (ref 0.1–0.9)
MONOCYTES NFR BLD AUTO: 7.6 % (ref 5–12)
NEUTROPHILS NFR BLD AUTO: 78.3 % (ref 42.7–76)
NEUTROPHILS NFR BLD AUTO: 8.64 10*3/MM3 (ref 1.7–7)
NRBC BLD AUTO-RTO: 0 /100 WBC (ref 0–0.2)
PERCENT MAX PREDICTED HR: 62.25 %
PHOSPHATE SERPL-MCNC: 3.2 MG/DL (ref 2.5–4.5)
PLATELET # BLD AUTO: 118 10*3/MM3 (ref 140–450)
PMV BLD AUTO: 10.2 FL (ref 6–12)
POTASSIUM SERPL-SCNC: 3.9 MMOL/L (ref 3.5–5.2)
PROT SERPL-MCNC: 6.3 G/DL (ref 6–8.5)
RBC # BLD AUTO: 4.78 10*6/MM3 (ref 4.14–5.8)
SODIUM SERPL-SCNC: 137 MMOL/L (ref 136–145)
STRESS BASELINE BP: NORMAL MMHG
STRESS BASELINE HR: 79 BPM
STRESS PERCENT HR: 73 %
STRESS POST PEAK BP: NORMAL MMHG
STRESS POST PEAK HR: 94 BPM
STRESS TARGET HR: 128 BPM
TRIGL SERPL-MCNC: 60 MG/DL (ref 0–150)
VLDLC SERPL-MCNC: 12 MG/DL (ref 5–40)
WBC NRBC COR # BLD: 11.04 10*3/MM3 (ref 3.4–10.8)

## 2023-06-07 PROCEDURE — 99223 1ST HOSP IP/OBS HIGH 75: CPT | Performed by: PSYCHIATRY & NEUROLOGY

## 2023-06-07 PROCEDURE — 97165 OT EVAL LOW COMPLEX 30 MIN: CPT

## 2023-06-07 PROCEDURE — G0378 HOSPITAL OBSERVATION PER HR: HCPCS

## 2023-06-07 PROCEDURE — 93017 CV STRESS TEST TRACING ONLY: CPT

## 2023-06-07 PROCEDURE — 93306 TTE W/DOPPLER COMPLETE: CPT | Performed by: INTERNAL MEDICINE

## 2023-06-07 PROCEDURE — 70450 CT HEAD/BRAIN W/O DYE: CPT

## 2023-06-07 PROCEDURE — 93018 CV STRESS TEST I&R ONLY: CPT | Performed by: INTERNAL MEDICINE

## 2023-06-07 PROCEDURE — 0 TECHNETIUM SESTAMIBI: Performed by: INTERNAL MEDICINE

## 2023-06-07 PROCEDURE — 70553 MRI BRAIN STEM W/O & W/DYE: CPT

## 2023-06-07 PROCEDURE — 25010000002 ENOXAPARIN PER 10 MG: Performed by: INTERNAL MEDICINE

## 2023-06-07 PROCEDURE — 25510000001 IOPAMIDOL PER 1 ML: Performed by: INTERNAL MEDICINE

## 2023-06-07 PROCEDURE — 80053 COMPREHEN METABOLIC PANEL: CPT | Performed by: INTERNAL MEDICINE

## 2023-06-07 PROCEDURE — A9577 INJ MULTIHANCE: HCPCS | Performed by: INTERNAL MEDICINE

## 2023-06-07 PROCEDURE — 78452 HT MUSCLE IMAGE SPECT MULT: CPT | Performed by: INTERNAL MEDICINE

## 2023-06-07 PROCEDURE — 84100 ASSAY OF PHOSPHORUS: CPT | Performed by: INTERNAL MEDICINE

## 2023-06-07 PROCEDURE — 93306 TTE W/DOPPLER COMPLETE: CPT

## 2023-06-07 PROCEDURE — 97161 PT EVAL LOW COMPLEX 20 MIN: CPT

## 2023-06-07 PROCEDURE — 70496 CT ANGIOGRAPHY HEAD: CPT

## 2023-06-07 PROCEDURE — 97166 OT EVAL MOD COMPLEX 45 MIN: CPT

## 2023-06-07 PROCEDURE — 0 GADOBENATE DIMEGLUMINE 529 MG/ML SOLUTION: Performed by: INTERNAL MEDICINE

## 2023-06-07 PROCEDURE — 83735 ASSAY OF MAGNESIUM: CPT | Performed by: INTERNAL MEDICINE

## 2023-06-07 PROCEDURE — A9500 TC99M SESTAMIBI: HCPCS | Performed by: INTERNAL MEDICINE

## 2023-06-07 PROCEDURE — 78452 HT MUSCLE IMAGE SPECT MULT: CPT

## 2023-06-07 PROCEDURE — 96372 THER/PROPH/DIAG INJ SC/IM: CPT

## 2023-06-07 PROCEDURE — 80061 LIPID PANEL: CPT | Performed by: INTERNAL MEDICINE

## 2023-06-07 PROCEDURE — 85025 COMPLETE CBC W/AUTO DIFF WBC: CPT | Performed by: INTERNAL MEDICINE

## 2023-06-07 PROCEDURE — 25010000002 REGADENOSON 0.4 MG/5ML SOLUTION: Performed by: INTERNAL MEDICINE

## 2023-06-07 PROCEDURE — 96361 HYDRATE IV INFUSION ADD-ON: CPT

## 2023-06-07 PROCEDURE — 99233 SBSQ HOSP IP/OBS HIGH 50: CPT | Performed by: INTERNAL MEDICINE

## 2023-06-07 RX ORDER — REGADENOSON 0.08 MG/ML
0.4 INJECTION, SOLUTION INTRAVENOUS
Status: COMPLETED | OUTPATIENT
Start: 2023-06-07 | End: 2023-06-07

## 2023-06-07 RX ORDER — ATORVASTATIN CALCIUM 40 MG/1
80 TABLET, FILM COATED ORAL NIGHTLY
Status: DISCONTINUED | OUTPATIENT
Start: 2023-06-07 | End: 2023-06-08 | Stop reason: HOSPADM

## 2023-06-07 RX ORDER — CLOPIDOGREL BISULFATE 75 MG/1
300 TABLET ORAL ONCE
Status: COMPLETED | OUTPATIENT
Start: 2023-06-07 | End: 2023-06-07

## 2023-06-07 RX ORDER — ATORVASTATIN CALCIUM 40 MG/1
40 TABLET, FILM COATED ORAL NIGHTLY
Status: DISCONTINUED | OUTPATIENT
Start: 2023-06-07 | End: 2023-06-07

## 2023-06-07 RX ADMIN — REGADENOSON 0.4 MG: 0.08 INJECTION, SOLUTION INTRAVENOUS at 13:15

## 2023-06-07 RX ADMIN — ATORVASTATIN CALCIUM 80 MG: 40 TABLET, FILM COATED ORAL at 20:53

## 2023-06-07 RX ADMIN — CETIRIZINE HYDROCHLORIDE 10 MG: 10 TABLET, FILM COATED ORAL at 17:48

## 2023-06-07 RX ADMIN — GADOBENATE DIMEGLUMINE 20 ML: 529 INJECTION, SOLUTION INTRAVENOUS at 18:50

## 2023-06-07 RX ADMIN — LOSARTAN POTASSIUM 25 MG: 25 TABLET, FILM COATED ORAL at 17:48

## 2023-06-07 RX ADMIN — Medication 1 TABLET: at 17:48

## 2023-06-07 RX ADMIN — OXYCODONE HYDROCHLORIDE AND ACETAMINOPHEN 500 MG: 500 TABLET ORAL at 17:47

## 2023-06-07 RX ADMIN — TECHNETIUM TC 99M SESTAMIBI 1 DOSE: 1 INJECTION INTRAVENOUS at 13:15

## 2023-06-07 RX ADMIN — PANTOPRAZOLE SODIUM 40 MG: 40 TABLET, DELAYED RELEASE ORAL at 17:48

## 2023-06-07 RX ADMIN — DOCUSATE SODIUM 50 MG AND SENNOSIDES 8.6 MG 2 TABLET: 8.6; 5 TABLET, FILM COATED ORAL at 20:53

## 2023-06-07 RX ADMIN — SODIUM CHLORIDE 100 ML/HR: 9 INJECTION, SOLUTION INTRAVENOUS at 17:49

## 2023-06-07 RX ADMIN — Medication 10 ML: at 09:32

## 2023-06-07 RX ADMIN — ENOXAPARIN SODIUM 40 MG: 40 INJECTION SUBCUTANEOUS at 20:53

## 2023-06-07 RX ADMIN — Medication 5000 UNITS: at 17:47

## 2023-06-07 RX ADMIN — IOPAMIDOL 87 ML: 755 INJECTION, SOLUTION INTRAVENOUS at 20:56

## 2023-06-07 RX ADMIN — Medication 180 MG: at 17:47

## 2023-06-07 RX ADMIN — Medication 10 ML: at 20:56

## 2023-06-07 RX ADMIN — CLOPIDOGREL BISULFATE 300 MG: 75 TABLET, FILM COATED ORAL at 20:53

## 2023-06-07 RX ADMIN — ASPIRIN 81 MG: 81 TABLET, COATED ORAL at 17:47

## 2023-06-07 RX ADMIN — TECHNETIUM TC 99M SESTAMIBI 1 DOSE: 1 INJECTION INTRAVENOUS at 10:35

## 2023-06-07 RX ADMIN — DOCUSATE SODIUM 50 MG AND SENNOSIDES 8.6 MG 2 TABLET: 8.6; 5 TABLET, FILM COATED ORAL at 17:48

## 2023-06-07 RX ADMIN — Medication 1000 MCG: at 17:47

## 2023-06-07 RX ADMIN — SODIUM CHLORIDE 100 ML/HR: 9 INJECTION, SOLUTION INTRAVENOUS at 03:20

## 2023-06-07 NOTE — H&P
TELENEUROLOGY CONSULTATION (consult done via TV exam of patient, review of chart)    HPI: Patient is a 69 y/o who presents with generalized weakness and nausea and dizziness. Patient was LKW at > 24hrs ago.  Patient defines dizziness as vertigo  , patient denies syncope.  Patient denies starting any new medications or changing doses of current medications recently.   feeling as if he will blackout when going from sitting to standing.   Patient denies focal numbness or weakness.   Patient denies convulsions, tongue biting or B/B incontinence with these episodes.  Patient is back to neurologic baseline at this time with no new neurologic symptoms.  Patient denies Difficulty swallowing  Patient denies Diplopia  Patient admits to Falling to the left   Patient admits to Headache on right side of his head.  Patient has 3 headache days each month.      PMH:          Past Medical History:   Diagnosis Date    Aortic stenosis, severe 07/2021    Arthritis      Cancer      COVID-19 01/2022    GERD (gastroesophageal reflux disease)      Sleep apnea      Wears glasses      Meds:     Social history: negative x 3     Family History: HTN    Allergies: NKDA    Review Of Systems: an extensive 14 point review of systems was obtained from patient and Chart and no additional symptoms other than those reported in the HPI were reported     General Physical Exam:    (a) General: alert, no apparent distress, no neglect       (b) HEENT: head normocephalic and atraumatic, sclera white, conjunctiva no erythema, no periorbital edema. No deformity on ear, nose, and throat area seen.       (c) Respiratory: no apparent respiratory distress and no use of accessory muscles. No audible wheezing       (d) CVS: regular rhythm and rate. No cyanosis or edema.       (e) Skin: no bleeding, rash, or hematoma.       (f) Musculoskeletal: no deformity or clubbing. No limited ROM observed, no joint swelling.       (g) Psych: normal affect, cooperative   Physical  examination:               Affect: pleasant, cooperative  Neuro exam:  COGNITIVE: Awake, alert and in no apparent distress. Oriented to self and current month. Patient responds easily to voices and correctly follows multi-step commands. Testing of calculation, spatial orientation, short-term memory, proverb interpretation and repetition are deferred. Good word finding and no aphasia.  CRANIAL NERVES: Good facial symmetry of lower half of face upon smiling. Good Facial symmetry of upper half of face and forehead when patient raises eyebrows.  Extraocular movements intact, no dysconjugate gaze or horizontal nystagmus seen.  Visual fields intact to confrontation, no hemianopsia, quadrantanopsia or central scotoma detected. Testing of olfaction, auditory acuity, palatal movement and shoulder shrug/spinal accessory nerve function are all deferred  MOTOR EXAM: lifts/holds up both arms for a full ten second count without drift or pronation. lifts/holds both legs off the bed for a full five second count without drift.  SENSORY EXAM: no sensory deficit to light touch noted. No sensory level noted. More extensive Proprioception/pain temperature spinothalamic sensation testing deferred.  GAIT: deferred      LOC                0        (0=alert; 1=drowsy; 2=stuporous; 3=coma) Does patient alert easily to voice?  QUESTIONS  0        (0=both correct; 1=one correct; 2=neither correct) What is the month? What is your age?  COMMANDS   0        (0=both correct; 1=one correct; 2=neither correct) Close eyes; make a fist  GAZE              0        (0=nl; 1=partial hor gaze paresis; 2=forced gaze dev)  FIELDS           0        (0=nl; 1=partial; 2=complete HH; 3=wil blind)  FACE              0        (0=nl; 1=minor; 2=partial (UMN); 3=LMN or wil)  RUE               0        (0=nl; 1=drift 10sec; 2=effort grav; 3=no effort grav; 4=0/5)  LUE                0        (as above)  RLE                0        (0=drift>5sec; 1=drift; 2=effort  grav; 3=no effort grav; 4=0/5)  LLE                0        (as above)  ATAXIA         2       (0=nl; 1=unil one limb; 2=unil two limbs or wil)  SENSORY        0        (0=nl; 1=partial; 2=dense)  APHASIA        0        (0=nl; 1=mild dysphasia; 2=severe; 3=mute)  DYSARTHRIA 0       (0=nl; 1=mild; 2=unintelligible) mama, top-top, fifty-fifty, thanks, huckleberry,   NEGLECT      0        (0=nl; 1=one modality; 2=sensory + visual)    TOTAL NIHSS    2  Neuroimaging reviewed by me shows head Ct   Area of abnormal low attenuation involving the left medial inferior  cerebellar hemisphere most consistent with evolving infarction possibly  in the late acute or early subacute phase. Correlation with history and  MRI recommended.  2.  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 d/c aspirin in 21 days after starting it, Start zetia 10mg qday for stroke prevention as per American stroke association guidelines as outpatient with script upon discharge  --repeat head CT in am if MRI not already done to assess growth in sie of cerebellar stroke  --needs driving safety evaluation before he can drive again. Patient expresses understanding and intent to comply      IZane MD, saw the patient for a follow up or in-patient or emergency room telememedicine face to face consult or chart review using interactive technology. The location of the patient was at [facility] . I was located at [facility] .    I have proceeded with this evaluation at the request of the referring practitioner as it is felt to be an emergency setting and no appropriate specialist is available to perform this evaluation. The originating hospital has reported that this is the correct patient and has obtained consent from the patient/surrogate whenever possible to perform this  telemedicine evaluation(including obtaining history, performing examination and reviewing data provided by the patient an/or originating site of care provider)    I have either done chart review only or introduced myself to the patient, provided my credentials, disclosed my location, and determined that, based on review of the patient's chart and discussion with the patient's primary team, telemedicine via a HIPAA compliant, real-time, face-to-face two-way, interactive audio and video platform is an appropriate and effective means of providing the service.    The patient/surrogate has a right to refuse this evaluation as they have been explained risks including potential loss of confidentiality, benefits, alternatives, and the potential need for subsequent face-to-face care. In this evaluation, we will be providing recommendations only.  The ultimate decision to follow or not to follow these recommendations will be left to the bedside treating/requesting practitioner.    The patient/surrogate whenevr possible has been notified that other healthcare professionals including technical person may be involved in this A/V evaluation.  All laws concerning confidentiality and patient access to medical records and copies of medical records apply to telemedicine.  The patient/surrogate has received the originating site's Health Notice of Privacy Practices.    Zane Mello MD, KAREN

## 2023-06-07 NOTE — THERAPY EVALUATION
Acute Care - Physical Therapy Initial Evaluation   [facility]     Patient Name: Ron Villa  : 1955  MRN: [id number]  Today's Date: 2023   Onset of Illness/Injury or Date of Surgery: 23 (admission date)  Visit Dx:     ICD-10-CM ICD-9-CM   1. Generalized weakness  R53.1 780.79   2. Misuse of medication  F19.90 305.90     Patient Active Problem List   Diagnosis    TAVR 21    GERD (gastroesophageal reflux disease)    Arthritis    AVD (aortic valve disease)    Gross hematuria    Generalized weakness     Past Medical History:   Diagnosis Date    Aortic stenosis, severe 2021    Arthritis     Cancer     COVID-19 2022    GERD (gastroesophageal reflux disease)     Sleep apnea     Wears glasses      Past Surgical History:   Procedure Laterality Date    AORTIC VALVE REPAIR/REPLACEMENT N/A 2021    Procedure: TRANSCATHETER AORTIC VALVE REPLACEMENT;  Surgeon: Prieto Tripp MD;  Location:  [facility];  Service: Cardiothoracic;  Laterality: N/A;  flouro-6 mins 18 sec  dose- 370mgy  contrast-85 ml isovue 370      AORTIC VALVE REPAIR/REPLACEMENT N/A 2021    Procedure: Transcatheter Aortic Valve Replacement;  Surgeon: Dorie White MD;  Location:  [facility];  Service: Cardiovascular;  Laterality: N/A;    CARDIAC CATHETERIZATION      CARDIAC CATHETERIZATION Left 2021    Procedure: Right and Left Heart Cath--C-19 ORDER FAXED TO [facility];  Surgeon: Power Burgos MD;  Location:  [facility];  Service: Cardiology;  Laterality: Left;  Week of     COLONOSCOPY      HERNIA REPAIR      TRANSESOPHAGEAL ECHOCARDIOGRAM (TERRI) N/A 2021    Procedure: TRANSESOPHAGEAL ECHOCARDIOGRAM WITH ANESTHESIA;  Surgeon: Prieto Tripp MD;  Location:  [facility];  Service: Cardiothoracic;  Laterality: N/A;     PT Assessment (last 12 hours)       PT Evaluation and Treatment       Row Name 23 7910          Physical Therapy Time and Intention     Document Type evaluation  -     Mode of Treatment physical therapy  -     Patient Effort good  -     Comment Pt seen for evaluation this date, pleasant and cooperative with therapy. Pt lives at home with wife, hx of a fall from truck/activity grossly 2 months ago. Pt does not use AD PLOF.  -       Row Name 06/07/23 1510          General Information    Patient Profile Reviewed yes  -     Onset of Illness/Injury or Date of Surgery 06/06/23  admission date  -     Patient Observations alert;cooperative  -     Prior Level of Function independent:  -       Row Name 06/07/23 1510          Living Environment    Current Living Arrangements home  -     People in Home spouse  -       Row Name 06/07/23 1510          Range of Motion Comprehensive    Comment, General Range of Motion AROM grossly WFL  -[facility] Name 06/07/23 1510          Strength Comprehensive (MMT)    Comment, General Manual Muscle Testing (MMT) Assessment Gross MMT of 5/5  -[facility] Name 06/07/23 1510          Bed Mobility    Bed Mobility supine-sit;sit-supine  -     Supine-Sit Paxton (Bed Mobility) independent;modified independence  -     Sit-Supine Pa[place] (Bed Mobility) independent;modified independence  -[facility] Name 06/07/23 1510          Transfers    Transfers sit-stand transfer;stand-sit transfer  -[facility] Name 06/07/23 1510          Sit-Stand Transfer    Sit-Stand [place] (Transfers) independent  -       Row Name 06/07/23 1510          Stand-Sit Transfer    Stand-Sit [place] (Transfers) independent  -[facility] Name 06/07/23 1510          Gait/Stairs (Locomotion)    Gait/Stairs Locomotion gait/ambulation independence  -     Paxton Level (Gait) independent;supervision  -     Comment, (Gait/Stairs) Pt exhibits lateral sway with ambulation, pt has good balance, however  -       Row Name 06/07/23 1510          Balance    Comment, Balance static standing with perturbation grossly  WFL  -       Row Name 06/07/23 1510          Plan of Care Review    Outcome Evaluation Pt seen for evaluation this date, pleasant and cooperative with therapy. Pt exhibits grossly baseline mobility with some staggering with standing, supervision recommended, however pt believes this may be attributed to not eating.  -       Row Name 06/07/23 1510          Positioning and Restraints    Pre-Treatment Position in bed  -     Post Treatment Position bed  -KH     In Bed supine;with family/caregiver  -       Row Name 06/07/23 1510          Therapy Assessment/Plan (PT)    Therapy Frequency (PT) evaluation only  -               User Key  (r) = Recorded By, (t) = Taken By, (c) = Cosigned By      Initials Name Provider Type    Ariela Myers PT Physical Therapist                      PT Recommendation and Plan  Therapy Frequency (PT): evaluation only  Outcome Evaluation: Pt seen for evaluation this date, pleasant and cooperative with therapy. Pt exhibits grossly baseline mobility with some staggering with standing, supervision recommended, however pt believes this may be attributed to not eating.       Time Calculation:    PT Charges       Row Name 06/07/23 1529             Time Calculation    Start Time 1500  -      PT Received On 06/07/23  -      PT Goal Re-Cert Due Date 06/21/23  -                User Key  (r) = Recorded By, (t) = Taken By, (c) = Cosigned By      Initials Name Provider Type    Ariela Myers PT Physical Therapist                  Therapy Charges for Today       Code Description Service Date Service Provider Modifiers Qty    03378839334 HC PT EVAL LOW COMPLEXITY 4 6/7/2023 Ariela Crowley, PT GP 1            PT G-Codes  AM-PAC 6 Clicks Score (PT): 12    Ariela Crowley PT  6/7/2023

## 2023-06-07 NOTE — PROGRESS NOTES
[facility] HOSPITALIST PROGRESS NOTE    Subjective     History:   Ron Villa is a 68 y.o. male admitted on 6/6/2023 secondary to Generalized weakness     Procedures:   6/7/23: Nuclear stress test    Left ventricular ejection fraction is normal (Calculated EF = 68%).    Myocardial perfusion imaging indicates a normal myocardial perfusion study with no evidence of ischemia.    TID 0.96.    Findings consistent with a normal ECG stress test.    CC: Follow up near syncope    Patient seen and examined with BETH Lewis. Awake and alert with his wife present at bedside. States he feels better today. No reported CP. No further episodes of N/V. No reported syncope. No acute events overnight per RN.     History taken from: patient, chart, and RN.      Objective     Vital Signs  Temp:  [98 °F (36.7 °C)-98.6 °F (37 °C)] 98.5 °F (36.9 °C)  Heart Rate:  [82-96] 86  Resp:  [16-20] 18  BP: (135-151)/(72-84) 151/80    Intake/Output Summary (Last 24 hours) at 6/7/2023 1733  Last data filed at 6/7/2023 1700  Gross per 24 hour   Intake 2478.63 ml   Output 300 ml   Net 2178.63 ml         Physical Exam:  General:    Awake, alert, in no acute distress   Heart:      Normal S1 and S2. Regular rate and rhythm. No significant murmur, rubs or gallops appreciated.   Lungs:     Respirations regular, even and unlabored. Lungs clear to auscultation B/L. No wheezes, rales or rhonchi.   Abdomen:   Soft and nontender. No guarding, rebound tenderness or  organomegaly noted. Bowel sounds present x 4.   Extremities:  No clubbing, cyanosis or edema noted. Moves UE and LE equally B/L.     Results Review:    Results from last 7 days   Lab Units 06/07/23  0414 06/06/23  1012   WBC 10*3/mm3 11.04* 7.87   HEMOGLOBIN g/dL 15.1 16.1   PLATELETS 10*3/mm3 118* 152     Results from last 7 days   Lab Units 06/07/23  0414 06/06/23  1012   SODIUM mmol/L 137 142   POTASSIUM mmol/L 3.9 3.6   CHLORIDE mmol/L 106 106   CO2 mmol/L 21.9* 21.3*   BUN  mg/dL 14 17   CREATININE mg/dL 0.73* 0.98   CALCIUM mg/dL 8.6 8.8   GLUCOSE mg/dL 121* 182*     Results from last 7 days   Lab Units 06/07/23  0414 06/06/23  1012   BILIRUBIN mg/dL 0.5 0.6   ALK PHOS U/L 46 51   AST (SGOT) U/L 29 29   ALT (SGPT) U/L 31 35     Results from last 7 days   Lab Units 06/07/23  0414 06/06/23  1322 06/06/23  1012   MAGNESIUM mg/dL 2.0 2.0 2.0     Results from last 7 days   Lab Units 06/06/23  1012   INR  1.00     Results from last 7 days   Lab Units 06/06/23  1322 06/06/23  1012   CK TOTAL U/L 372*  --    HSTROP T ng/L 8 9       Imaging Results (Last 24 Hours)       Procedure Component Value Units Date/Time    CT Head Without Contrast [[id number]] Collected: 06/07/23 0840     Updated: 06/07/23 0844    Narrative:      EXAM:    CT Head Without Intravenous Contrast     EXAM DATE:    6/7/2023 7:31 AM     CLINICAL HISTORY:    Presyncope; R53.1-Weakness; F19.90-Other psychoactive substance use,  unspecified, uncomplicated     TECHNIQUE:    Axial computed tomography images of the head/brain without intravenous  contrast.  Sagittal and coronal reformatted images were created and  reviewed.  This CT exam was performed using one or more of the following  dose reduction techniques:  automated exposure control, adjustment of  the mA and/or kV according to patient size, and/or use of iterative  reconstruction technique.     COMPARISON:    No relevant prior studies available.     FINDINGS:    Brain and extra-axial spaces:  Area of abnormal low attenuation  involving the left medial inferior cerebellar hemisphere most consistent  with evolving infarction possibly in the late acute or early subacute  phase.  No intracranial hemorrhage identified.  No significant white  matter disease.    Bones/joints:  Unremarkable.  No acute fracture.    Soft tissues:  Unremarkable.    Sinuses:  Unremarkable as visualized.  No acute sinusitis.    Mastoid air cells:  Unremarkable as visualized.  No mastoid effusion.        Impression:      1.  Area of abnormal low attenuation involving the left medial inferior  cerebellar hemisphere most consistent with evolving infarction possibly  in the late acute or early subacute phase. Correlation with history and  MRI recommended.  2.  No intracranial hemorrhage identified.     This report was finalized on 6/7/2023 8:42 AM by Dr. Chi Gill MD.       US Carotid Bilateral [[id number]] Collected: 06/07/23 0736     Updated: 06/07/23 0740    Narrative:      EXAMINATION: US CAROTID BILATERAL-      Technique: Multiple real-time color Doppler images were acquired of  bilateral carotid arteries.     Stenosis measurements if obtained, were performed by the NASCET or  similar method.        CLINICAL INDICATION:     Presyncope; R53.1-Weakness; F19.90-Other  psychoactive substance use, unspecified, uncomplicated     COMPARISON:    None     FINDINGS:        RIGHT:  No significant intimal thickening or plaque is noted. No occlusion.     RIGHT ICA PSV: 82.2 cm/s  RIGHT ICA EDV: 12.2 cm/s.   Right ICA/CCA Ratio: 1.0  Anterograde flow is demonstrated in RIGHT vertebral artery.     LEFT:  No significant intimal thickening or plaque is noted. No occlusion.     LEFT ICA PSV: 103 cm/s  LEFT EDV: 22.2 cm/s.   Left ICA/CCA Ratio: 0.7  Anterograde flow is demonstrated in LEFT vertebral artery.          Impression:      Impression:     No evidence of hemodynamically significant plaques or stenosis within  the carotid system at this time.     This report was finalized on 6/7/2023 7:38 AM by Dr. Artis Edge MD.       XR Abdomen KUB [[id number]] Collected: 06/06/23 2238     Updated: 06/06/23 2240    Narrative:      AP views of the abdomen     Indications: Nausea vomiting constipation     Findings:     AP view of the abdomen shows a normal bowel gas pattern.  No evidence of  bowel obstruction or ileus.  Degenerative changes involve the lumbar  spine.  No abnormal calcifications are seen.  There is a moderate amount  of  fecal residue throughout the colon.       Impression:      Impression:     No evidence of bowel obstruction or ileus.     This report was finalized on 6/6/2023 10:38 PM by Shaheed Montgomery MD.                 Medications:  vitamin C, 500 mg, Oral, Daily  aspirin, 81 mg, Oral, Daily  atorvastatin, 80 mg, Oral, Nightly  cetirizine, 10 mg, Oral, Daily  clopidogrel, 300 mg, Oral, Once  enoxaparin, 40 mg, Subcutaneous, Nightly  losartan, 25 mg, Oral, Daily  multivitamin with minerals, 1 tablet, Oral, Daily  pantoprazole, 40 mg, Oral, Daily  senna-docusate sodium, 2 tablet, Oral, BID  sodium chloride, 10 mL, Intravenous, Q12H  vitamin B-12, 1,000 mcg, Oral, Daily  vitamin D3, 5,000 Units, Oral, Daily  Vitamin E, 1 capsule, Oral, Daily      sodium chloride, 100 mL/hr, Last Rate: 100 mL/hr (06/07/23 0547)            Assessment & Plan   Near syncope: No acute ischemic changes on EKG with negative troponin's. TSH is normal. No evidence of hemodynamically significant plaques or stenosis on carotid US. Echo reveals an EF of 56-60%, grade I diastolic dysfunction and mild AS. CT head obtained revealing area of abnormal low attenuation involving the left medial inferior cerebellar hemisphere most consistent with evolving infarction possibly in the late acute or early subacute phase with no evidence of hemorrhage. Neuro consulted with recs for ASA, statin, a dose of Plavix, CTA head and EEG. MRI ordered. Cont maintenance IVF's. Monitor on telemetry. Neurology input appreciated.      Chest pain: No acute ischemic changes on EKG. Troponin's are negative. Echo as above. S/P nuclear stress test today with no evidence of ischemia. Cont ASA and statin.      Generalized weakness: No gross focal deficits on exam. Imaging as above. Cont maintenance IVF's. PT/OT.      Nausea and vomiting: Unclear etiology. Possibly 2/2 above. Pt denies any known sick contacts. No evidence of obstruction on KUB. Appears improved today. Cont IVF's and PRN  antiemetics.      Hx of severe AS s/p TAVR: Echo reveals mild AS as above.       Hyperglycemia: HgbA1c is 5.8. Cont to monitor.      Borderline hypokalemia: K+ stable today. Mg is 2. Cont electrolyte replacement protocols.      DVT PPX: SQ Lovenox     Disposition: Possibly home tomorrow pending additional workup.     Je Aj,   06/07/23  17:33 EDT

## 2023-06-07 NOTE — PLAN OF CARE
Goal Outcome Evaluation:  Plan of Care Reviewed With: patient        Progress: improving     Patient resting in bed.  Patient NPO after Midnight. Surgery bath completed. VSS. No complaints or acute changes. Will continue with plan of care.

## 2023-06-07 NOTE — PLAN OF CARE
Goal Outcome Evaluation:           Progress: no change  Outcome Evaluation: pt has been resting in bed. pt ambulated in room indep. pt getting stress test done per orders. no other changes to note at this time; will continue to monitor

## 2023-06-07 NOTE — PLAN OF CARE
Goal Outcome Evaluation:              Outcome Evaluation: Pt seen for evaluation this date, pleasant and cooperative with therapy. Pt exhibits grossly baseline mobility with some staggering with standing, supervision recommended, however pt believes this may be attributed to not eating.

## 2023-06-07 NOTE — THERAPY EVALUATION
Patient Name: Ron Villa  : 1955    MRN: [id number]                              Today's Date: 2023       Admit Date: 2023    Visit Dx:     ICD-10-CM ICD-9-CM   1. Generalized weakness  R53.1 780.79   2. Misuse of medication  F19.90 305.90     Patient Active Problem List   Diagnosis    TAVR 21    GERD (gastroesophageal reflux disease)    Arthritis    AVD (aortic valve disease)    Gross hematuria    Generalized weakness     Past Medical History:   Diagnosis Date    Aortic stenosis, severe 2021    Arthritis     Cancer     COVID-19 2022    GERD (gastroesophageal reflux disease)     Sleep apnea     Wears glasses      Past Surgical History:   Procedure Laterality Date    AORTIC VALVE REPAIR/REPLACEMENT N/A 2021    Procedure: TRANSCATHETER AORTIC VALVE REPLACEMENT;  Surgeon: Prieto Tripp MD;  Location:  [facility];  Service: Cardiothoracic;  Laterality: N/A;  flouro-6 mins 18 sec  dose- 370mgy  contrast-85 ml isovue 370      AORTIC VALVE REPAIR/REPLACEMENT N/A 2021    Procedure: Transcatheter Aortic Valve Replacement;  Surgeon: Dorie White MD;  Location:  [facility];  Service: Cardiovascular;  Laterality: N/A;    CARDIAC CATHETERIZATION      CARDIAC CATHETERIZATION Left 2021    Procedure: Right and Left Heart Cath--C-19 ORDER FAXED TO  [place];  Surgeon: Power Burgos MD;  Location:  [facility];  Service: Cardiology;  Laterality: Left;  Week of     COLONOSCOPY      HERNIA REPAIR      TRANSESOPHAGEAL ECHOCARDIOGRAM (TERRI) N/A 2021    Procedure: TRANSESOPHAGEAL ECHOCARDIOGRAM WITH ANESTHESIA;  Surgeon: Prieto Tripp MD;  Location:  [facility];  Service: Cardiothoracic;  Laterality: N/A;      General Information       Row Name 23 1501          OT Time and Intention    Document Type evaluation  -     Mode of Treatment individual therapy;occupational therapy  -       Row Name 23 1501           General Information    Patient Profile Reviewed yes  -     Prior Level of Function independent:;community mobility;all household mobility;ADL's;driving  -     Existing Precautions/Restrictions no known precautions/restrictions  -     Barriers to Rehab none identified  -       Row Name 06/07/23 1501          Occupational Profile    Reason for Services/Referral (Occupational Profile) Patient was admitted to [facility] on 6/6/2023. He was referred for OT evaluation due to change in functional peformance with ADLs, functional mobility, and/or transfers.  -       Row Name 06/07/23 1501          Living Environment    People in Home spouse  -       Row Name 06/07/23 1501          Cognition    Orientation Status (Cognition) oriented x 4  -               User Key  (r) = Recorded By, (t) = Taken By, (c) = Cosigned By      Initials Name Provider Type     Anahi Cooper, OT Occupational Therapist                     Mobility/ADL's       Row Name 06/07/23 1504          Bed Mobility    Bed Mobility bed mobility (all) activities  -     All Activities, [place] (Bed Mobility) independent;modified [facility]     Assistive Device (Bed Mobility) bed rails  -       Row Name 06/07/23 1504          Functional Mobility    Functional Mobility- Comment functional mobility within room to and from bathroom independently  -       Row Name 06/07/23 1504          Activities of Daily Living    BADL Assessment/Intervention bathing;upper body dressing;lower body dressing;grooming;toileting  -       Row Name 06/07/23 1504          Bathing Assessment/Intervention    [place] Level (Bathing) bathing skills;independent;modified independence  -KP       Row Name 06/07/23 1504          Upper Body Dressing Assessment/Training    [place] Level (Upper Body Dressing) upper body dressing skills;independent;modified independence  -KP       Row Name 06/07/23 1504          Lower Body Dressing  Assessment/Training    [place] Level (Lower Body Dressing) lower body dressing skills;independent;modified independence  -[facility] Name 06/07/23 1504          Grooming Assessment/Training    [place] Level (Grooming) grooming skills;independent  -[facility] Name 06/07/23 1504          Toileting Assessment/Training    [place] Level (Toileting) toileting skills;independent;modified independence  -               User Key  (r) = Recorded By, (t) = Taken By, (c) = Cosigned By      Initials Name Provider Type    Anahi Amato OT Occupational Therapist                   Obj/Interventions       [facility] Name 06/07/23 1505          Sensory Assessment (Somatosensory)    Sensory Assessment (Somatosensory) UE sensation intact  -[facility] Name 06/07/23 1505          Vision Assessment/Intervention    Visual Impairment/Limitations WFL  -KP       Row Name 06/07/23 1505          Range of Motion Comprehensive    General Range of Motion no range of motion deficits identified  -KP       Row Name 06/07/23 1505          Strength Comprehensive (MMT)    Comment, General Manual Muscle Testing (MMT) Assessment 5/5 MMT in BUEs  -KP       Row Name 06/07/23 1505          Motor Skills    Motor Skills coordination;functional endurance  -     Coordination WFL;upper extremity  -     Functional Endurance fair plus/good  -KP       Row Name 06/07/23 1505          Balance    Balance Assessment sitting static balance;standing static balance  -     Static Sitting Balance independent  -     Static Standing Balance independent;modified [place]  -               User Key  (r) = Recorded By, (t) = Taken By, (c) = Cosigned By      Initials Name Provider Type    Anahi Amato OT Occupational Therapist                   Goals/Plan    No documentation.                  Clinical Impression       [facility] Name 06/07/23 1505          Pain Assessment    Pretreatment Pain Rating 0/10 - no pain  -     Posttreatment Pain Rating  0/10 - no pain  -       Row Name 06/07/23 1505          Plan of Care Review    Plan of Care Reviewed With patient  -     Progress no change  -     Outcome Evaluation Patient seen for OT evaluation. He currently presents with no significant limitations impacting functional performance with ADLs and functional mobility/transfers. Patient is at modified independent/independent level at this time. No further OT skilled services, patient will be discharged from services.  -       Row Name 06/07/23 1505          Therapy Assessment/Plan (OT)    Criteria for Skilled Therapeutic Interventions Met (OT) no;no problems identified which require skilled intervention  -     Therapy Frequency (OT) evaluation only  -       Row Name 06/07/23 1505          Therapy Plan Review/Discharge Plan (OT)    Anticipated Discharge Disposition (OT) home  -[facility] Name 06/07/23 1505          Positioning and Restraints    Pre-Treatment Position in bed  -     Post Treatment Position bed  -     In Bed fowlers;with family/caregiver  -               User Key  (r) = Recorded By, (t) = Taken By, (c) = Cosigned By      Initials Name Provider Type     Anahi Cooper, OT Occupational Therapist                   Outcome Measures       Row Name 06/07/23 0784          How much help from another person do you currently need...    Turning from your back to your side while in flat bed without using bedrails? 3  -BD     Moving from lying on back to sitting on the side of a flat bed without bedrails? 3  -BD     Moving to and from a bed to a chair (including a wheelchair)? 2  -BD     Standing up from a chair using your arms (e.g., wheelchair, bedside chair)? 2  -BD     Climbing 3-5 steps with a railing? 1  -BD     To walk in hospital room? 1  -BD     AM-PAC 6 Clicks Score (PT) 12  -BD     Highest level of mobility 4 --> Transferred to chair/commode  -               User Key  (r) = Recorded By, (t) = Taken By, (c) = Cosigned By       Initials Name Provider Type    Debbie Zapata, RN Registered Nurse                      OT Recommendation and Plan  Therapy Frequency (OT): evaluation only  Plan of Care Review  Plan of Care Reviewed With: patient  Progress: no change  Outcome Evaluation: Patient seen for OT evaluation. He currently presents with no significant limitations impacting functional performance with ADLs and functional mobility/transfers. Patient is at modified independent/independent level at this time. No further OT skilled services, patient will be discharged from services.     Time Calculation:    Time Calculation- OT       Row Name 06/07/23 1508             Time Calculation- OT    OT Received On 06/07/23  -                User Key  (r) = Recorded By, (t) = Taken By, (c) = Cosigned By      Initials Name Provider Type    Anahi Amato OT Occupational Therapist                  Therapy Charges for Today       Code Description Service Date Service Provider Modifiers Qty    [id number] HC OT EVAL LOW COMPLEXITY 4 6/7/2023 Anahi Cooper OT GO 1                 Anahi Cooper OT  6/7/2023

## 2023-06-08 ENCOUNTER — TELEPHONE (OUTPATIENT)
Dept: NEUROLOGY | Facility: CLINIC | Age: 68
End: 2023-06-08
Payer: MEDICARE

## 2023-06-08 ENCOUNTER — APPOINTMENT (OUTPATIENT)
Dept: RESPIRATORY THERAPY | Facility: HOSPITAL | Age: 68
End: 2023-06-08
Payer: MEDICARE

## 2023-06-08 VITALS
HEART RATE: 79 BPM | SYSTOLIC BLOOD PRESSURE: 159 MMHG | HEIGHT: 75 IN | BODY MASS INDEX: 31.26 KG/M2 | OXYGEN SATURATION: 99 % | RESPIRATION RATE: 19 BRPM | TEMPERATURE: 98.5 F | DIASTOLIC BLOOD PRESSURE: 88 MMHG | WEIGHT: 251.4 LBS

## 2023-06-08 LAB
ALBUMIN SERPL-MCNC: 3.9 G/DL (ref 3.5–5.2)
ALBUMIN/GLOB SERPL: 1.4 G/DL
ALP SERPL-CCNC: 47 U/L (ref 39–117)
ALT SERPL W P-5'-P-CCNC: 34 U/L (ref 1–41)
ANION GAP SERPL CALCULATED.3IONS-SCNC: 9.8 MMOL/L (ref 5–15)
AST SERPL-CCNC: 35 U/L (ref 1–40)
BASOPHILS # BLD AUTO: 0.03 10*3/MM3 (ref 0–0.2)
BASOPHILS NFR BLD AUTO: 0.3 % (ref 0–1.5)
BILIRUB SERPL-MCNC: 0.8 MG/DL (ref 0–1.2)
BUN SERPL-MCNC: 12 MG/DL (ref 8–23)
BUN/CREAT SERPL: 13.8 (ref 7–25)
CALCIUM SPEC-SCNC: 8.8 MG/DL (ref 8.6–10.5)
CHLORIDE SERPL-SCNC: 107 MMOL/L (ref 98–107)
CO2 SERPL-SCNC: 23.2 MMOL/L (ref 22–29)
CREAT SERPL-MCNC: 0.87 MG/DL (ref 0.76–1.27)
DEPRECATED RDW RBC AUTO: 51.6 FL (ref 37–54)
EGFRCR SERPLBLD CKD-EPI 2021: 93.4 ML/MIN/1.73
EOSINOPHIL # BLD AUTO: 0.06 10*3/MM3 (ref 0–0.4)
EOSINOPHIL NFR BLD AUTO: 0.7 % (ref 0.3–6.2)
ERYTHROCYTE [DISTWIDTH] IN BLOOD BY AUTOMATED COUNT: 14.4 % (ref 12.3–15.4)
GLOBULIN UR ELPH-MCNC: 2.8 GM/DL
GLUCOSE SERPL-MCNC: 113 MG/DL (ref 65–99)
HCT VFR BLD AUTO: 47.8 % (ref 37.5–51)
HGB BLD-MCNC: 15.7 G/DL (ref 13–17.7)
IMM GRANULOCYTES # BLD AUTO: 0.02 10*3/MM3 (ref 0–0.05)
IMM GRANULOCYTES NFR BLD AUTO: 0.2 % (ref 0–0.5)
LYMPHOCYTES # BLD AUTO: 2.31 10*3/MM3 (ref 0.7–3.1)
LYMPHOCYTES NFR BLD AUTO: 26.9 % (ref 19.6–45.3)
MCH RBC QN AUTO: 31.8 PG (ref 26.6–33)
MCHC RBC AUTO-ENTMCNC: 32.8 G/DL (ref 31.5–35.7)
MCV RBC AUTO: 97 FL (ref 79–97)
MONOCYTES # BLD AUTO: 0.72 10*3/MM3 (ref 0.1–0.9)
MONOCYTES NFR BLD AUTO: 8.4 % (ref 5–12)
NEUTROPHILS NFR BLD AUTO: 5.44 10*3/MM3 (ref 1.7–7)
NEUTROPHILS NFR BLD AUTO: 63.5 % (ref 42.7–76)
NRBC BLD AUTO-RTO: 0 /100 WBC (ref 0–0.2)
PLATELET # BLD AUTO: 134 10*3/MM3 (ref 140–450)
PMV BLD AUTO: 10.6 FL (ref 6–12)
POTASSIUM SERPL-SCNC: 3.8 MMOL/L (ref 3.5–5.2)
PROT SERPL-MCNC: 6.7 G/DL (ref 6–8.5)
RBC # BLD AUTO: 4.93 10*6/MM3 (ref 4.14–5.8)
SODIUM SERPL-SCNC: 140 MMOL/L (ref 136–145)
WBC NRBC COR # BLD: 8.58 10*3/MM3 (ref 3.4–10.8)

## 2023-06-08 PROCEDURE — 96361 HYDRATE IV INFUSION ADD-ON: CPT

## 2023-06-08 PROCEDURE — 99233 SBSQ HOSP IP/OBS HIGH 50: CPT | Performed by: PSYCHIATRY & NEUROLOGY

## 2023-06-08 PROCEDURE — G0378 HOSPITAL OBSERVATION PER HR: HCPCS

## 2023-06-08 PROCEDURE — 99239 HOSP IP/OBS DSCHRG MGMT >30: CPT | Performed by: INTERNAL MEDICINE

## 2023-06-08 PROCEDURE — 95819 EEG AWAKE AND ASLEEP: CPT

## 2023-06-08 PROCEDURE — 85025 COMPLETE CBC W/AUTO DIFF WBC: CPT | Performed by: INTERNAL MEDICINE

## 2023-06-08 PROCEDURE — 80053 COMPREHEN METABOLIC PANEL: CPT | Performed by: INTERNAL MEDICINE

## 2023-06-08 PROCEDURE — 95819 EEG AWAKE AND ASLEEP: CPT | Performed by: PSYCHIATRY & NEUROLOGY

## 2023-06-08 RX ORDER — ATORVASTATIN CALCIUM 80 MG/1
80 TABLET, FILM COATED ORAL NIGHTLY
Qty: 30 TABLET | Refills: 0 | Status: SHIPPED | OUTPATIENT
Start: 2023-06-08

## 2023-06-08 RX ORDER — EZETIMIBE 10 MG/1
10 TABLET ORAL DAILY
Qty: 30 TABLET | Refills: 0 | Status: SHIPPED | OUTPATIENT
Start: 2023-06-08

## 2023-06-08 RX ORDER — CLOPIDOGREL BISULFATE 75 MG/1
75 TABLET ORAL DAILY
Status: DISCONTINUED | OUTPATIENT
Start: 2023-06-08 | End: 2023-06-08 | Stop reason: HOSPADM

## 2023-06-08 RX ORDER — CLOPIDOGREL BISULFATE 75 MG/1
75 TABLET ORAL DAILY
Qty: 30 TABLET | Refills: 0 | Status: SHIPPED | OUTPATIENT
Start: 2023-06-08

## 2023-06-08 RX ORDER — ASPIRIN 81 MG/1
81 TABLET ORAL DAILY
Qty: 21 TABLET | Refills: 0 | Status: SHIPPED | OUTPATIENT
Start: 2023-06-08 | End: 2023-06-29

## 2023-06-08 RX ADMIN — OXYCODONE HYDROCHLORIDE AND ACETAMINOPHEN 500 MG: 500 TABLET ORAL at 08:03

## 2023-06-08 RX ADMIN — Medication 1000 MCG: at 08:04

## 2023-06-08 RX ADMIN — Medication 10 ML: at 08:04

## 2023-06-08 RX ADMIN — Medication 1 TABLET: at 08:04

## 2023-06-08 RX ADMIN — DOCUSATE SODIUM 50 MG AND SENNOSIDES 8.6 MG 2 TABLET: 8.6; 5 TABLET, FILM COATED ORAL at 08:04

## 2023-06-08 RX ADMIN — Medication 5000 UNITS: at 08:04

## 2023-06-08 RX ADMIN — Medication 180 MG: at 08:04

## 2023-06-08 RX ADMIN — LOSARTAN POTASSIUM 25 MG: 25 TABLET, FILM COATED ORAL at 08:04

## 2023-06-08 RX ADMIN — ASPIRIN 81 MG: 81 TABLET, COATED ORAL at 08:04

## 2023-06-08 RX ADMIN — CETIRIZINE HYDROCHLORIDE 10 MG: 10 TABLET, FILM COATED ORAL at 08:04

## 2023-06-08 RX ADMIN — SODIUM CHLORIDE 100 ML/HR: 9 INJECTION, SOLUTION INTRAVENOUS at 04:24

## 2023-06-08 RX ADMIN — PANTOPRAZOLE SODIUM 40 MG: 40 TABLET, DELAYED RELEASE ORAL at 08:04

## 2023-06-08 RX ADMIN — CLOPIDOGREL BISULFATE 75 MG: 75 TABLET, FILM COATED ORAL at 11:16

## 2023-06-08 NOTE — PLAN OF CARE
Goal Outcome Evaluation:              Outcome Evaluation: Patient being discharged home with family.

## 2023-06-08 NOTE — DISCHARGE SUMMARY
[facility] DISCHARGE SUMMARY      Date of Admission: 6/6/2023    Date of Discharge: 6/9/2023     PCP: Geovanni Greene MD    Admission Diagnosis:   Please see admission H&P    Discharge Diagnosis:   Subacute left cerebellar CVA  Near syncope  Chest pain  Generalized weakness  Nausea and vomiting  Hx of severe AS s/p TAVR  Hyperglycemia with HgbA1c of 5.8  Borderline hypokalemia   Obesity by BMI     Procedures Performed:  6/7/23: Nuclear stress test    Left ventricular ejection fraction is normal (Calculated EF = 68%).    Myocardial perfusion imaging indicates a normal myocardial perfusion study with no evidence of ischemia.    TID 0.96.    Findings consistent with a normal ECG stress test.     Consults:   Consults       Date and Time Order Name Status Description    6/7/2023  4:34 PM Inpatient Neurology Consult General                History of Present Illness:  Ron Villa is a 68 y.o. male who presented to [facility] ED with CC of nausea, vomiting and generalized weakness. Please see admission H&P for complete details.     In the ED, he was afebrile and hemodynamically stable. EKG revealed NSR with no acute ischemic changes. Troponin's negative x 2. CPK mildly elevated. Routine labs revealed low normal K+, mild hyperglycemia, normal TSH and normal CBC. CXR did not reveal any acute cardiopulmonary findings. He was given Zofran and IVF's.      Hospital Course  Ron Villa was admitted to the med/surg floor for further evaluation and treatment. He was started on maintenance IVF's with PRN antiemetics. ASA was ordered. The hospital's electrolyte replacement protocols were initiated. PT/OT were consulted.     A KUB was obtained in the setting of N/V and reported constipation with no evidence of obstruction. An echo was obtained revealing an EF of 56-60%, grade I diastolic dysfunction and mild AS. A nuclear stress test was performed with no evidence of ischemia. TSH was normal. HgbA1c was 5.8. Lipid panel  was stable. Carotid US did not reveal any evidence of hemodynamically significant plaques or stenosis.     In the setting of his near syncope, a CT head was obtained revealing an area of abnormal low attenuation involving the left medial inferior cerebellar hemisphere most consistent with evolving infarction possibly in the late acute or early subacute phase with no evidence of hemorrhage. An MRI brain was obtained revealing restricted diffusion in the medial and lower left cerebellar hemisphere extending to the left posterolateral margin of the fourth ventricle with involvement of the medial aspect of the left cerebellar peduncle with findings consistent with a subacute infarct. Teleneurology was consulted who ordered a loading dose of Plavix and high intensity statin with recs for DAPT x 3 weeks, followed by treatment with Plavix and a statin. Neurology ordered CTA of the head with no evidence of a large vessel occlusion identified. An EEG was also obtained with no evidence of seizure activity.     Throughout his hospitalization, he quickly began showing clinical improvement. His N/V resolved and he was able to tolerate a regular diet. His generalized weakness improved and he was soon eager to go home. He was deemed medically stable for discharge on 6/8 after discussion with neurology. Instructions were given for outpatient follow up with his PCP and neurology with instructions for no driving until cleared by his PCP and/or neurology.     Condition on Discharge:  Stable    Vital Signs  Vitals:    06/08/23 1000   BP: 159/88   Pulse: 79   Resp: 19   Temp: 98.5 °F (36.9 °C)   SpO2:        Physical Exam:  General:    Awake, alert, in no acute distress   Heart:      Normal S1 and S2. Regular rate and rhythm. No significant murmur, rubs or gallops appreciated.   Lungs:     Respirations regular, even and unlabored. Lungs clear to auscultation B/L. No wheezes, rales or rhonchi.   Abdomen:   Soft and nontender. No  guarding, rebound tenderness or  organomegaly noted. Bowel sounds present x 4.   Extremities:  No clubbing, cyanosis or edema noted. Moves UE and LE equally B/L.     Discharge Disposition:   home      Discharge Medications:     Discharge Medications        New Medications        Instructions Start Date   atorvastatin 80 MG tablet  Commonly known as: LIPITOR   80 mg, Oral, Nightly      clopidogrel 75 MG tablet  Commonly known as: PLAVIX   75 mg, Oral, Daily      ezetimibe 10 MG tablet  Commonly known as: Zetia   10 mg, Oral, Daily             Continue These Medications        Instructions Start Date   aspirin 81 MG EC tablet   81 mg, Oral, Daily      Baclofen 5 MG tablet  Commonly known as: LIORESAL   1 tablet, Oral, As Needed      fexofenadine 180 MG tablet  Commonly known as: ALLEGRA   180 mg, Oral, Daily      FISH OIL PO   1,400 mg, Oral, Daily With Breakfast      losartan 25 MG tablet  Commonly known as: Cozaar   25 mg, Oral, Daily      multivitamin with minerals tablet tablet   1 tablet, Oral, Daily      OSTEO BI-FLEX TRIPLE STRENGTH PO   1 tablet, Oral, Daily      pantoprazole 40 MG EC tablet  Commonly known as: PROTONIX   40 mg, Oral, Daily, \      vitamin B-12 1000 MCG tablet  Commonly known as: CYANOCOBALAMIN   1,000 mcg, Oral, Daily      vitamin C 250 MG tablet  Commonly known as: ASCORBIC ACID   500 mg, Oral, Daily      VITAMIN D3 PO   Oral, Daily      Vitamin E 180 MG (400 UNIT) capsule capsule   1 capsule, Oral, Daily             Stop These Medications      sildenafil 100 MG tablet  Commonly known as: VIAGRA                Discharge Diet:   Dietary Orders (From admission, onward)       Start     Ordered    06/07/23 1517  Diet: Regular/House Diet; Texture: Regular Texture (IDDSI 7); Fluid Consistency: Thin (IDDSI 0)  Diet Effective Now        References:    Diet Order [place]   Question Answer Comment   Diets: Regular/House Diet    Texture: Regular Texture (IDDSI 7)    Fluid Consistency: Thin (IDDSI 0)         06/07/23 1516                    Activity at Discharge:  No driving until cleared by PCP and/or neurology.    Follow-up Appointments:  Additional Instructions for the Follow-ups that You Need to Schedule       Discharge Follow-up with PCP   As directed       Currently Documented PCP:    Geovanni Greene MD    PCP Phone Number:    [phone]     Follow Up Details: Follow up with Dr. Greene in 1 week.         Discharge Follow-up with Specified Provider: Follow up with neurology clinic in 4 weeks.   As directed      To: Follow up with neurology clinic in 4 weeks.                Follow-up Information       Geovanni Greene MD .    Specialty: Internal Medicine  Why: Follow up with Dr. Greene in 1 week.  Contact information:  [address]  [place] [address]  [phone]                           Your Scheduled Appointments      Jun 08, 2023 12:35 PM  EEG with [facility] OP EEG CARDIOPULMONARY  [facility] [place] CARDIOPULMONARY ([place]) [address] [facility]  [place] [id number] [phone]   EEG SLEEP DEPRIVED: Only 5 hours of sleep the night before the test. No caffeine the morning of the test.  ALL EEG ORDERS: No hairspray or gel products in the hair. Hair pieces, wigs and/or hair weaves must be removed prior to test. Take medications as normal unless otherwise instructed by your physician.         Aug 25, 2023  8:30 AM  Hospital Follow Up with BAL Melo  [facility] NEUROLOGY [place] (--) [address] [facility]  [place] [address]  [phone]        Feb 14, 2024 11:45 AM  Follow Up with Dorie White MD  [facility] CARDIOLOGY [facility] ([place]) [address] [place] [address] [address]  [facility] [address]  [phone]   -Bring photo ID, insurance card, and list of medications to appointment  -If testing was completed outside of [facility] then patient must bring images on a disc  -Copay will be collected at time of appointment  -Established  patients should arrive 15 minutes prior to appointment       Additional instructions:    You have a scheduled follow-up with Dr. Greene on 6/12/23 at 10:15. Office number [phone]             Test Results Pending at Discharge:       The 10-year ASCVD risk score (Klaudia DALLAS, et al., 2019) is: 38.2%    Values used to calculate the score:      Age: 68 years      Sex: Male      Is Non- : No      Diabetic: Yes      Tobacco smoker: No      Systolic Blood Pressure: 159 mmHg      Is BP treated: No      HDL Cholesterol: 40 mg/dL      Total Cholesterol: 142 mg/dL      Je Aj DO  06/08/23  10:46 EDT      Time: Greater than 30 minutes spent on this discharge.

## 2023-06-08 NOTE — PROGRESS NOTES
SUBJECTIVE  Patient admits to Dizziness    OBJECTIVE  MRI brain 1.  Restricted diffusion noted in the medial and lower left cerebellar  hemisphere extending to the left posterolateral margin of the fourth  ventricle with involvement of the medial aspect of the left cerebellar  peduncle. Findings consistent with subacute infarct.  2.  No hydrocephalus.  3.  Mild generalized brain volume loss consistent with age.  4.  Mild chronic small vessel ischemic type changes in the  periventricular deep white matter.    ECHO = EF >35%, no intracardiac clot, no PFO    Exam: alert and oriented x3  CN 2-12 intact  strength 5/5  CUS = no sig stenosis    ASSESSMENT/PLAN: near syncope likely cardiac, check for seizure with EEG. Subacute left cerebellar stroke   AWAITS: CTA H/EEG (ordered)   d/c aspirin in 21 days after starting it, continue plavix and lipitor          I, Zane Mello MD, saw the patient for a follow up or in-patient or emergency room telememedicine face to face consult or chart review using interactive technology. The location of the patient was at [facility] . I was located at [facility] .    I have proceeded with this evaluation at the request of the referring practitioner as it is felt to be an emergency setting and no appropriate specialist is available to perform this evaluation. The originating hospital has reported that this is the correct patient and has obtained consent from the patient/surrogate whenever possible to perform this telemedicine evaluation(including obtaining history, performing examination and reviewing data provided by the patient an/or originating site of care provider)    I have either done chart review only or introduced myself to the patient, provided my credentials, disclosed my location, and determined that, based on review of the patient's chart and discussion with the patient's primary team, telemedicine via a HIPAA compliant, real-time, face-to-face two-way,  interactive audio and video platform is an appropriate and effective means of providing the service.    The patient/surrogate has a right to refuse this evaluation as they have been explained risks including potential loss of confidentiality, benefits, alternatives, and the potential need for subsequent face-to-face care. In this evaluation, we will be providing recommendations only.  The ultimate decision to follow or not to follow these recommendations will be left to the bedside treating/requesting practitioner.    The patient/surrogate whenevr possible has been notified that other healthcare professionals including technical person may be involved in this A/V evaluation.  All laws concerning confidentiality and patient access to medical records and copies of medical records apply to telemedicine.  The patient/surrogate has received the originating site's Health Notice of Privacy Practices.    Zane Mello MD, KAREN

## 2023-06-08 NOTE — PLAN OF CARE
Goal Outcome Evaluation:              Outcome Evaluation: Patient resting in bed at this time. VSS. Patient had CT done this shift. No complaints or acute changes. Will continue plan of care.

## 2023-08-23 NOTE — PROGRESS NOTES
a  New Patient Office Visit      Encounter Date: 2023   Patient Name: Ron Villa  : 1955   MRN: [id number]   PCP: Geovanni Greene MD    Referring Provider: Geovanni Greene MD     Chief Complaint:    Chief Complaint   Patient presents with    Stroke       History of Present Illness: Ron Villa is a 68 y.o. male with known medical diagnoses of aortic stenosis s/p TAVR, arthritis, GERD, prediabetes, DANNY, and recent stroke presents today to establish care.  Patient presented to ED on 2023 with generalized weakness, nausea/vomiting.  During course of work-up he had CT head which showed low attenuation in left medial inferior cerebellar hemisphere that was felt to be involving infarction, late acute/early subacute, this was later confirmed to be subacute on MRI brain.  CTA head did not show any evidence of LVO.  Bilateral carotid ultrasound negative for any flow-limiting stenosis.  TTE showed EF 56 to 60%, mild aortic stenosis.  He also had an EEG performed which was reported as being normal study.  Patient was discharged home on DAPT with Plavix and aspirin for 3 weeks to be followed by aspirin monotherapy along with high intensity statin and Zetia for secondary stroke prevention.    Today he denies any ataxia, no dizziness, no nausea.  He states he will have an occasional headache.  He states his vision seems somewhat blurry, especially in the morning.  He reports that he feels 90% back to his normal self.  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  He is fully compliant with his medications, he has not had any issues with cost.  He denies any new stroke/TIA symptoms.     Stroke Risk Factors: Prior stroke, aortic stenosis, prediabetes, DANNY      Subjective      Review of Systems:   Review of Systems   Constitutional:  Positive for fatigue. Negative for chills and fever.   Eyes:  Positive for visual disturbance.   Respiratory: Negative.     Cardiovascular:  Negative.    Musculoskeletal:  Positive for arthralgias and back pain.   Skin: Negative.    Neurological:  Positive for headache. Negative for seizures, facial asymmetry, speech difficulty, weakness and numbness.   Psychiatric/Behavioral: Negative.       Past Medical History:   Past Medical History:   Diagnosis Date    Aortic stenosis, severe 07/2021    Arthritis     Cancer     COVID-19 01/2022    GERD (gastroesophageal reflux disease)     Sleep apnea     Wears glasses        Past Surgical History:   Past Surgical History:   Procedure Laterality Date    AORTIC VALVE REPAIR/REPLACEMENT N/A 11/8/2021    Procedure: TRANSCATHETER AORTIC VALVE REPLACEMENT;  Surgeon: Prieto Tripp MD;  Location: [facility];  Service: Cardiothoracic;  Laterality: N/A;  flouro-6 mins 18 sec  dose- 370mgy  contrast-85 ml isovue 370      AORTIC VALVE REPAIR/REPLACEMENT N/A 11/8/2021    Procedure: Transcatheter Aortic Valve Replacement;  Surgeon: Dorie White MD;  Location: [facility];  Service: Cardiovascular;  Laterality: N/A;    CARDIAC CATHETERIZATION      CARDIAC CATHETERIZATION Left 8/27/2021    Procedure: Right and Left Heart Cath--C-19 ORDER FAXED TO  [facility];  Surgeon: Power Burgos MD;  Location: [facility] CATH INVASIVE LOCATION;  Service: Cardiology;  Laterality: Left;  Week of 8/23    COLONOSCOPY      HERNIA REPAIR      TRANSESOPHAGEAL ECHOCARDIOGRAM (TERRI) N/A 11/8/2021    Procedure: TRANSESOPHAGEAL ECHOCARDIOGRAM WITH ANESTHESIA;  Surgeon: Prieto Tripp MD;  Location: [facility];  Service: Cardiothoracic;  Laterality: N/A;       Family History:   Family History   Problem Relation Age of Onset    Heart disease Mother     Heart attack Father     No Known Problems Sister     No Known Problems Brother        Social History:   Social History     Socioeconomic History    Marital status:      Spouse name: Livia    Years of education: HS   Tobacco Use    Smoking status: Former      Packs/day: 1.00     Years: 6.00     Pack years: 6.00     Types: Cigarettes     Quit date:      Years since quittin.6    Smokeless tobacco: Current     Types: Chew   Vaping Use    Vaping Use: Never used   Substance and Sexual Activity    Alcohol use: Yes     Alcohol/week: 2.0 standard drinks     Types: 2 Cans of beer per week     Comment: daily    Drug use: Never    Sexual activity: Defer       Medications:     Current Outpatient Medications:     atorvastatin (LIPITOR) 80 MG tablet, Take 1 tablet by mouth Every Night., Disp: 30 tablet, Rfl: 0    Baclofen 5 MG tablet, Take 1 tablet by mouth As Needed. PRN, Disp: , Rfl:     Cholecalciferol (VITAMIN D3 PO), Take  by mouth Daily., Disp: , Rfl:     clopidogrel (PLAVIX) 75 MG tablet, Take 1 tablet by mouth Daily., Disp: 30 tablet, Rfl: 0    ezetimibe (Zetia) 10 MG tablet, Take 1 tablet by mouth Daily., Disp: 30 tablet, Rfl: 0    fexofenadine (ALLEGRA) 180 MG tablet, Take 1 tablet by mouth Daily., Disp: , Rfl:     losartan (Cozaar) 25 MG tablet, Take 1 tablet by mouth Daily., Disp: 30 tablet, Rfl: 11    Misc Natural Products (OSTEO BI-FLEX TRIPLE STRENGTH PO), Take 1 tablet by mouth Daily., Disp: , Rfl:     multivitamin with minerals tablet tablet, Take 1 tablet by mouth Daily., Disp: , Rfl:     Omega-3 Fatty Acids (FISH OIL PO), Take 1,400 mg by mouth Daily With Breakfast., Disp: , Rfl:     pantoprazole (PROTONIX) 40 MG EC tablet, Take 1 tablet by mouth Daily. \, Disp: , Rfl:     vitamin B-12 (CYANOCOBALAMIN) 1000 MCG tablet, Take 1 tablet by mouth Daily., Disp: , Rfl:     vitamin C (ASCORBIC ACID) 250 MG tablet, Take 2 tablets by mouth Daily., Disp: , Rfl:     Vitamin E 180 MG (400 UNIT) capsule capsule, Take 1 capsule by mouth Daily., Disp: , Rfl:     Allergies:   No Known Allergies    Objective     Physical Exam:  Vital Signs:   Vitals:    23 0835   BP: 124/75   BP Location: Right arm   Patient Position: Sitting   Cuff Size: Adult   Pulse: 83   SpO2: 92%  "  Weight: 116 kg (256 lb 12.8 oz)   Height: 190.5 cm (75\")     Body mass index is 32.1 kg/mý.     Physical Exam  Vitals reviewed.   Constitutional:       General: He is awake. He is not in acute distress.     Appearance: He is obese. He is not ill-appearing.   HENT:      Head: Normocephalic.      Mouth/Throat:      Mouth: Mucous membranes are moist.      Pharynx: Oropharynx is clear.   Eyes:      General: Lids are normal.      Extraocular Movements: Extraocular movements intact.      Pupils: Pupils are equal, round, and reactive to light.   Cardiovascular:      Rate and Rhythm: Normal rate.   Pulmonary:      Effort: Pulmonary effort is normal. No respiratory distress.   Musculoskeletal:         General: Normal range of motion.   Skin:     General: Skin is warm and dry.   Neurological:      General: No focal deficit present.      Mental Status: He is alert.      Motor: Motor strength is normal.   Psychiatric:         Mood and Affect: Mood normal.         Speech: Speech normal.         Behavior: Behavior normal.     Neurological Exam  Mental Status  Awake and alert. Oriented to person, place, time and situation. Recent and remote memory are intact. Speech is normal. Language is fluent with no aphasia. Attention and concentration are normal. Fund of knowledge is appropriate for level of education.    Cranial Nerves  CN II: Visual fields full to confrontation.  CN III, IV, VI: Extraocular movements intact bilaterally. Normal lids and orbits bilaterally. Pupils equal round and reactive to light bilaterally.  CN V: Facial sensation is normal.  CN VII: Full and symmetric facial movement.  CN IX, X: Palate elevates symmetrically  CN XI: Shoulder shrug strength is normal.  CN XII: Tongue midline without atrophy or fasciculations.    Motor  Normal muscle bulk throughout. Normal muscle tone. No abnormal involuntary movements. Strength is 5/5 throughout all four extremities.    Sensory  Light touch is normal in upper and lower " extremities.     Coordination  Right: Finger-to-nose normal.Left: Finger-to-nose normal.    Gait  Casual gait is normal including stance, stride, and arm swing.     NIH Stroke Scale    1a  Level of consciousness: 0=alert; keenly responsive   1b. LOC questions:  0=Answers both questions correctly    1c. LOC commands: 0=Performs both tasks correctly   2.  Best Gaze: 0=normal   3. Visual: 0=No visual loss   4. Facial Palsy: 0=Normal symmetric movement   5a. Motor left arm: 0=No drift, limb holds 90 (or 45) degrees for full 10 seconds   5b.  Motor right arm: 0=No drift, limb holds 90 (or 45) degrees for full 10 seconds   6a. Motor left le=No drift, limb holds 90 (or 45) degrees for full 10 seconds   6b  Motor right le=No drift, limb holds 90 (or 45) degrees for full 10 seconds   7. Limb Ataxia: 0=Absent   8.  Sensory: 0=Normal; no sensory loss   9. Best Language:  0=No aphasia, normal   10. Dysarthria: 0=Normal   11. Extinction and Inattention: 0=No abnormality    Total:   0         Modified Warren Score: 1        0  No Symptoms    1 No significant disability. Able to carry out all usual activities, despite some symptoms.    2 Slight disability. Able to look after own affairs without assistance, but unable to carry out all previous activities.    3 Moderate disability. Requires some help, but able to walk unassisted.    4 Moderately severe disability. Unable to attend to own bodily needs without assistance, and unable to walk unassisted.    5 Severe disability. Requires constant nursing care and attention, bedridden, incontinent.    6 Dead      PHQ-9 Depression Screening  Little interest or pleasure in doing things? 1-->several days   Feeling down, depressed, or hopeless? 0-->not at all   Trouble falling or staying asleep, or sleeping too much? 0-->not at all   Feeling tired or having little energy? 2-->more than half the days   Poor appetite or overeating? 0-->not at all   Feeling bad about yourself - or that  you are a failure or have let yourself or your family down? 0-->not at all   Trouble concentrating on things, such as reading the newspaper or watching television? 0-->not at all   Moving or speaking so slowly that other people could have noticed? Or the opposite - being so fidgety or restless that you have been moving around a lot more than usual? 0-->not at all   Thoughts that you would be better off dead, or of hurting yourself in some way? 0-->not at all   PHQ-9 Total Score 3   If you checked off any problems, how difficult have these problems made it for you to do your work, take care of things at home, or get along with other people? somewhat difficult        Imaging Reviewed:     Results for orders placed during the hospital encounter of 06/06/23    Adult Transthoracic Echo Complete W/ Cont if Necessary Per Protocol    Interpretation Summary    Left ventricular systolic function is normal. Calculated left ventricular EF = 60% Left ventricular ejection fraction appears to be 56 - 60%.    Left ventricular diastolic function is consistent with (grade I) impaired relaxation.    Mild aortic valve stenosis is present.    EEG  Narrative: Reason for referral:    68 y.o.male with dizziness, near syncope, consideration of seizures    Technical Summary:     A 19 channel digital EEG was performed using the international 10-20   placement system, including eye leads and EKG leads.    Duration: 28 minutes    Findings:    The awake tracing shows diffuse low amplitude intermixed theta and alpha   activity present symmetrically over both hemispheres.  At times a 9 Hz   posterior rhythm is evident over the occipital leads.  Photic stimulation   yields a symmetric driving response.  Hyperventilation does not change the   background.  Sleep is seen with mild slowing of the background and vertex   waves.  No focal features or epileptiform activity are seen.    Video: Available    Technical quality: Superior    EKG: Regular, 60  bpm    SUMMARY:    Normal EEG in the awake and asleep states    No focal features or epileptiform activity are seen  Impression: Normal study    This report is transcribed using the Dragon dictation system.    CT Angiogram Head  Narrative: EXAM:    CT Angiography Head With Intravenous Contrast     EXAM DATE:    6/7/2023 8:33 PM     CLINICAL HISTORY:    Neuro deficit, acute, stroke suspected; R53.1-Weakness; F19.90-Other  psychoactive substance use, unspecified, uncomplicated     TECHNIQUE:    Axial computed tomographic angiography images of the head with  intravenous contrast. LVO analysis was performed with [email or web address] software.   This CT exam was performed using one or more of the following dose  reduction techniques:  automated exposure control, adjustment of the mA  and/or kV according to patient size, and/or use of iterative  reconstruction technique.    MIP reconstructed images were created and reviewed.     COMPARISON:    CT, MRI 06/07/2023     FINDINGS:    Right internal carotid artery:  No acute findings.  Intracranial  segment is patent with no significant stenosis.  No aneurysm.    Right anterior cerebral artery:  Unremarkable.  No occlusion or  significant stenosis.  No aneurysm.    Right middle cerebral artery:  Unremarkable.  No occlusion or  significant stenosis.  No aneurysm.    Right posterior cerebral artery:  Unremarkable.  No occlusion or  significant stenosis.  No aneurysm.    Right vertebral artery:  No vertebral artery dissection is evident.       Left internal carotid artery:  No acute findings.  Intracranial  segment is patent with no significant stenosis.  No aneurysm.    Left anterior cerebral artery:  Unremarkable.  No occlusion or  significant stenosis.  No aneurysm.    Left middle cerebral artery:  Unremarkable.  No occlusion or  significant stenosis.  No aneurysm.    Left posterior cerebral artery:  Unremarkable.  No occlusion or  significant stenosis.  No aneurysm.    Left vertebral  artery:  Intracranial segments of the left vertebral  artery appear patent although somewhat diminutive.       Basilar artery:  Unremarkable.  No occlusion or significant stenosis.   No aneurysm.     Impression: 1.  Somewhat diminutive appearance of the left vertebral artery  intracranial segments but no focal areas of high-grade stenosis or  occlusion identified. This may be related to right vertebral artery  dominance constituting normal variant vascular anatomy.  2.  Otherwise unremarkable CT of the brain with no large vessel  occlusion identified.     This report was finalized on 6/8/2023 8:56 AM by Dr. Chi Gill MD.       EEG    Result Date: 6/8/2023  Normal study This report is transcribed using the Dragon dictation system.      CT Head Without Contrast    Result Date: 6/7/2023  1.  Area of abnormal low attenuation involving the left medial inferior cerebellar hemisphere most consistent with evolving infarction possibly in the late acute or early subacute phase. Correlation with history and MRI recommended. 2.  No intracranial hemorrhage identified.  This report was finalized on 6/7/2023 8:42 AM by Dr. Chi Gill MD.      MRI Brain With & Without Contrast    Result Date: 6/7/2023   1.  Restricted diffusion noted in the medial and lower left cerebellar hemisphere extending to the left posterolateral margin of the fourth ventricle with involvement of the medial aspect of the left cerebellar peduncle. Findings consistent with subacute infarct. 2.  No hydrocephalus. 3.  Mild generalized brain volume loss consistent with age. 4.  Mild chronic small vessel ischemic type changes in the periventricular deep white matter. 5.  No acute hemorrhage or mass. 6.  No shift of midline structures. 7.  Clear mastoid air cells. 8.  Minimal mucosal thickening in the ethmoid air cells. 9.  No pathologic contrast enhancement.  This report was finalized on 6/7/2023 11:05 PM by Morris Lucas MD.      XR Chest 1  View    Result Date: 6/6/2023    Unremarkable exam. No acute cardiopulmonary findings identified.  This report was finalized on 6/6/2023 11:03 AM by Dr. Chi Gill MD.      US Carotid Bilateral    Result Date: 6/7/2023  Impression:  No evidence of hemodynamically significant plaques or stenosis within the carotid system at this time.  This report was finalized on 6/7/2023 7:38 AM by Dr. Artis Edge MD.      CT Angiogram Head    Result Date: 6/8/2023  1.  Somewhat diminutive appearance of the left vertebral artery intracranial segments but no focal areas of high-grade stenosis or occlusion identified. This may be related to right vertebral artery dominance constituting normal variant vascular anatomy. 2.  Otherwise unremarkable CT of the brain with no large vessel occlusion identified.  This report was finalized on 6/8/2023 8:56 AM by Dr. Chi Gill MD.      XR Abdomen KUB    Result Date: 6/6/2023  Impression:  No evidence of bowel obstruction or ileus.  This report was finalized on 6/6/2023 10:38 PM by Shaheed Montgomery MD.      Laboratory Results:    Lab Results   Component Value Date    HGBA1C 5.80 (H) 06/06/2023     Lab Results   Component Value Date    WBC 8.58 06/08/2023    HGB 15.7 06/08/2023    HCT 47.8 06/08/2023    MCV 97.0 06/08/2023     (L) 06/08/2023      Lab Results   Component Value Date    GLUCOSE 113 (H) 06/08/2023    BUN 12 06/08/2023    CREATININE 0.87 06/08/2023    EGFRIFNONA 98 11/09/2021    BCR 13.8 06/08/2023    K 3.8 06/08/2023    CO2 23.2 06/08/2023    CALCIUM 8.8 06/08/2023    ALBUMIN 3.9 06/08/2023    AST 35 06/08/2023    ALT 34 06/08/2023      Lab Results   Component Value Date    CHOL 142 06/07/2023    CHOL 167 08/27/2021     Lab Results   Component Value Date    TRIG 60 06/07/2023    TRIG 89 08/27/2021     Lab Results   Component Value Date    HDL 40 06/07/2023    HDL 39 (L) 08/27/2021     Lab Results   Component Value Date    LDL 90 06/07/2023     (H) 08/27/2021       Lab Results   Component Value Date    TSH 1.550 06/06/2023     No results found for: FOLATE  No results found for: [id number]            Assessment / Plan      Assessment/Plan:   Diagnoses and all orders for this visit:    1. History of stroke (Primary)  -     Ambulatory Referral to Cardiology    2. Prediabetes    3. TAVR 11/08/21  -     Ambulatory Referral to Cardiology    4. 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  No hx of A-fib, is s/p TAVR, etiology undetermined  -Completed 3 weeks of DAPT, continue ASA monotherapy  -Continue atorvastatin and Zetia  -BP today 124/75, similar readings at home  -Repeat stroke labs at next visit  -He is compliant with his CPAP  -Patient was not orthostatic today   Lying   131/79 HR-65   Sitting 123/86 HR-65   Standing 136/75 HR-63  -After discussion with Dr. Castillo he recommended the patient be seen by cardiology for consideration of TERRI, patient is s/p TAVR 11/2021, TTE performed during hospitalization reported mild aortic stenosis.  Will consider 30 day event monitor, if TERRI okay  -Spoke with patient and provided information regarding mediterranean diet      Discussed the importance of medication compliance and lifestyle modifications (adequate blood pressure control, adequate control of hyperlipidemia, adequate glycemic control, increase physical activity, and healthy diet) to help reduce the risk of future cerebrovascular events.  Also discussed the signs symptoms that would warrant the patient return back to the emergency department including unilateral weakness, unilateral numbness, visual disturbances, loss of balance, speech difficulties, and/or a sudden severe headache.     Blood Pressure control to 120-140 systolic  Goal LDL <70  Serum Glucose < 140  Call 911 for any stroke symptoms    Follow Up:   Return in about 3 months (around 11/25/2023).    Chi Riley,  FNP-[facility] Neuro Stroke     This document has been electronically signed by BAL Melo  August 25, 2023 11:45 EDT    Please note that portions of this note were completed with a voice recognition program. Efforts were made to edit dictation, but occasionally words are mistranscribed.

## 2023-08-25 ENCOUNTER — TELEPHONE (OUTPATIENT)
Dept: CARDIOLOGY | Facility: CLINIC | Age: 68
End: 2023-08-25
Payer: MEDICARE

## 2023-08-25 ENCOUNTER — OFFICE VISIT (OUTPATIENT)
Dept: NEUROLOGY | Facility: CLINIC | Age: 68
End: 2023-08-25
Payer: MEDICARE

## 2023-08-25 VITALS
BODY MASS INDEX: 31.93 KG/M2 | WEIGHT: 256.8 LBS | HEART RATE: 83 BPM | SYSTOLIC BLOOD PRESSURE: 124 MMHG | OXYGEN SATURATION: 92 % | HEIGHT: 75 IN | DIASTOLIC BLOOD PRESSURE: 75 MMHG

## 2023-08-25 DIAGNOSIS — R73.03 PREDIABETES: ICD-10-CM

## 2023-08-25 DIAGNOSIS — I35.9 AVD (AORTIC VALVE DISEASE): ICD-10-CM

## 2023-08-25 DIAGNOSIS — Z86.73 HISTORY OF STROKE: Primary | ICD-10-CM

## 2023-08-25 DIAGNOSIS — I35.0 AORTIC STENOSIS, SEVERE: ICD-10-CM

## 2023-08-25 NOTE — TELEPHONE ENCOUNTER
Spoke with patient about referral from neurology.  I will discuss with [facility] when she returns next week and call him back.  He verbalizes understanding.

## 2023-08-30 ENCOUNTER — TELEPHONE (OUTPATIENT)
Dept: CARDIOLOGY | Facility: CLINIC | Age: 68
End: 2023-08-30
Payer: MEDICARE

## 2023-08-30 DIAGNOSIS — I35.0 AORTIC STENOSIS, SEVERE: Primary | ICD-10-CM

## 2023-08-30 DIAGNOSIS — I63.9 CEREBROVASCULAR ACCIDENT (CVA), UNSPECIFIED MECHANISM: ICD-10-CM

## 2023-08-30 DIAGNOSIS — I35.9 AVD (AORTIC VALVE DISEASE): ICD-10-CM

## 2023-08-30 DIAGNOSIS — R55 SYNCOPE AND COLLAPSE: ICD-10-CM

## 2023-08-30 NOTE — TELEPHONE ENCOUNTER
Per [facility]-patient needs [facility] to evaluate further CVA, Valvular disease and syncope.  LM with patient to return call.

## 2023-08-31 ENCOUNTER — TELEPHONE (OUTPATIENT)
Dept: CARDIOLOGY | Facility: CLINIC | Age: 68
End: 2023-08-31
Payer: MEDICARE

## 2023-08-31 NOTE — TELEPHONE ENCOUNTER
Spoke with patient.  He is instructed that [facility] has been ordered to look for arrhythmia that could of caused stroke, as it was embolic in nature.  If he has any questions he is to call our office.  If we see anything on his monitor we will call him.  He verbalizes understanding.

## 2023-10-17 DIAGNOSIS — I63.40 CEREBROVASCULAR ACCIDENT (CVA) DUE TO EMBOLISM OF CEREBRAL ARTERY: Primary | ICD-10-CM

## 2023-10-17 DIAGNOSIS — I35.0 AORTIC STENOSIS, SEVERE: ICD-10-CM

## 2023-11-09 ENCOUNTER — OFFICE VISIT (OUTPATIENT)
Dept: CARDIOLOGY | Facility: CLINIC | Age: 68
End: 2023-11-09
Payer: MEDICARE

## 2023-11-09 VITALS
HEART RATE: 60 BPM | OXYGEN SATURATION: 97 % | DIASTOLIC BLOOD PRESSURE: 82 MMHG | BODY MASS INDEX: 31.61 KG/M2 | HEIGHT: 75 IN | SYSTOLIC BLOOD PRESSURE: 122 MMHG | WEIGHT: 254.2 LBS

## 2023-11-09 DIAGNOSIS — I35.9 AVD (AORTIC VALVE DISEASE): Primary | ICD-10-CM

## 2023-11-09 DIAGNOSIS — I10 ESSENTIAL HYPERTENSION: ICD-10-CM

## 2023-11-09 DIAGNOSIS — R09.89 CAROTID BRUIT, UNSPECIFIED LATERALITY: ICD-10-CM

## 2023-11-09 DIAGNOSIS — E78.00 HYPERCHOLESTEROLEMIA: ICD-10-CM

## 2023-11-09 PROCEDURE — 1159F MED LIST DOCD IN RCRD: CPT | Performed by: INTERNAL MEDICINE

## 2023-11-09 PROCEDURE — 1160F RVW MEDS BY RX/DR IN RCRD: CPT | Performed by: INTERNAL MEDICINE

## 2023-11-09 PROCEDURE — 99213 OFFICE O/P EST LOW 20 MIN: CPT | Performed by: INTERNAL MEDICINE

## 2023-11-09 RX ORDER — ASPIRIN 81 MG/1
81 TABLET ORAL DAILY
Start: 2023-11-09

## 2023-11-09 NOTE — H&P (VIEW-ONLY)
"[facility] Cardiology    Patient ID: Ron Villa is a 68 y.o. male.  : 1955   Contact: [phone]    Encounter date: 2023    PCP: Geovanni Greene MD      Chief complaint:   Chief Complaint   Patient presents with    AVD (aortic valve disease       Problem List:  Aortic stenosis  \"Heart murmur\" x 20 years   Symptoms of worsening chest pain and shortness of breath summer   Stress MPS 2021: normal LVEF, small-sized mild degree of ischemia located in inferior wall, \"low-risk study\"  Echo 2021: EF 66-70%. Moderate AV calcification. Severe AS (peak velocity 408cm/sec, mean gradient 34mmHg, peak gradient 67 mmHg). Mild MR.  R/LHC, 2021: Normal LVSF and coronary arteries. Severe aortic stenosis. Near normal hemodynamics.    TERRI, 2021: Pre-TAVR. EF 55-60%. LVH. Trace MR and TR. Mild AI and AV appears bicuspid. Severe AS, pk/mn gradient 40/22mmHg. DENNY by continuity 0.79cm2. Aorta grade 3/5 atheroma.   TAVR/TERRI, 2021 (Dr. White): EF 60%. Placement  of a 29 mm Nix ANTHONY 3 pericardial prosthesis: small paravalvular leak is seen beneath the right coronary leaflet. Mean gradient 7 mmHg. Calculated valve area 2.4 cm². <1cm pericardial effusion, unchanged following procedure.   Echocardiogram, 2021: AV mean gradient 16 mmHg.  Normal LVEF. (Mean gradient was 7 mm at the time of implantation.)    Echocardiogram, 2022: EF 60%. Pericardial prosthesis is present in the aortic position. AV mean gradient 16 mmHg. No paravalvular leak seen. Started on Coumadin  Echo 22, [facility]: EF 60%, 29 mm ANTHONY 3 pericardial prothesis. Ao mean PG 12 mmHg.   Treadmill stress test, 2022; The patient reported shortness of breath but no chest discomfort during exercise. Oxygen saturation 97% with exercise. THR of 130 bpm met at 6:16. Mild reduction in exercise tolerance. Expected exercise duration 7:40. Actual exercise duration 6:20. Test stopped " due to patient's inability to keep up with the pace and shortness of breath. Normal blood pressure response to exercise. No ST segment shift from baseline was seen with exercise. No evidence of inducible ischemia by clinical or electrocardiographic criteria.  Echocardiogram 8/11/2022: Normal LVEF.  29 mm ANTHONY 3 TAVR valve gradient 13 mmHg.  Echocardiogram, 12/05/2022; EF 65%. 29 mm Anthony 3 TAVR. Aortic valve area is 1.3 cm2. Mean gradient 15 mmHg. Trace TR. RVSP 18 mmHg.   Carotid US, 06/06/2023: No evidence of hemodynamically significant plaques or stenosis within the carotid system at this time.  Echo, 06/07/2023: EF 60%. Left ventricular diastolic function is consistent with (grade I) impaired relaxation. Mild AV stenosis.  MPS, 06/07/2023: EF 68%. Normal with no evidence of ischemia.  MCOT, 08/30/2023: SR/SB. No PACs. Frequent PVCs. Avg rate 67 bpm.  Osteoarthritis   DANNY on CPAP     No Known Allergies    Current Medications:    Current Outpatient Medications:     atorvastatin (LIPITOR) 80 MG tablet, Take 1 tablet by mouth Every Night., Disp: 30 tablet, Rfl: 0    Baclofen 5 MG tablet, Take 1 tablet by mouth As Needed. PRN, Disp: , Rfl:     Cholecalciferol (VITAMIN D3 PO), Take  by mouth Daily., Disp: , Rfl:     ezetimibe (Zetia) 10 MG tablet, Take 1 tablet by mouth Daily., Disp: 30 tablet, Rfl: 0    fexofenadine (ALLEGRA) 180 MG tablet, Take 1 tablet by mouth Daily., Disp: , Rfl:     losartan (Cozaar) 25 MG tablet, Take 1 tablet by mouth Daily., Disp: 30 tablet, Rfl: 11    Misc Natural Products (OSTEO BI-FLEX TRIPLE STRENGTH PO), Take 1 tablet by mouth Daily., Disp: , Rfl:     multivitamin with minerals tablet tablet, Take 1 tablet by mouth Daily., Disp: , Rfl:     Omega-3 Fatty Acids (FISH OIL PO), Take 1,400 mg by mouth Daily With Breakfast., Disp: , Rfl:     pantoprazole (PROTONIX) 40 MG EC tablet, Take 1 tablet by mouth Daily. \, Disp: , Rfl:     vitamin B-12 (CYANOCOBALAMIN) 1000 MCG tablet, Take 1 tablet  "by mouth Daily., Disp: , Rfl:     vitamin C (ASCORBIC ACID) 250 MG tablet, Take 2 tablets by mouth Daily., Disp: , Rfl:     Vitamin E 180 MG (400 UNIT) capsule capsule, Take 1 capsule by mouth Daily., Disp: , Rfl:     HPI    Ron Villa is a 68 y.o. male who presents today for a follow up of aortic valve disease and cardiac risk factors. Since last visit, patient has been doing well overall from a cardiovascular standpoint. He has an upcoming TERRI on November 21st. Patient expresses anxiety with his upcoming TERRI due to a past unpleasant experience. He questions if he can start taking his Mobic again for his arthritis and was reassured that he can. Patient's wife states that the morning of his stroke the patient had taken 300 mg of Viagra before exercising and wonders if that was the cause of his stroke and was reassured it is not likely. He denies chest pain, shortness of breath, orthopnea, palpitations, edema, dizziness, and syncope.        The following portions of the patient's history were reviewed and updated as appropriate: allergies, current medications and problem list.    Pertinent positives as listed in the HPI.  All other systems reviewed are negative.         Vitals:    11/09/23 1109   BP: 122/82   BP Location: Left arm   Patient Position: Sitting   Pulse: 60   SpO2: 97%   Weight: 115 kg (254 lb 3.2 oz)   Height: 190.5 cm (75\")       Physical Exam:  General: Alert and oriented.  Neck: Jugular venous pressure is within normal limits. Carotids have normal upstrokes without bruits.   Cardiovascular: Heart has a nondisplaced focal PMI. Regular rate and rhythm. No murmur, gallop or rub.  Lungs: Clear, no rales or wheezes. Equal expansion is noted.   Extremities: Show no edema.  Skin: Warm and dry.  Neurologic: Nonfocal.     Diagnostic Data (reviewed with patient):  Lab Results   Component Value Date    GLUCOSE 113 (H) 06/08/2023    BUN 12 06/08/2023    CREATININE 0.87 06/08/2023    BCR 13.8 06/08/2023    NA " 140 06/08/2023    K 3.8 06/08/2023     06/08/2023    CO2 23.2 06/08/2023    CALCIUM 8.8 06/08/2023    ALBUMIN 3.9 06/08/2023    ALKPHOS 47 06/08/2023    AST 35 06/08/2023    ALT 34 06/08/2023     Lab Results   Component Value Date    CHOL 142 06/07/2023    TRIG 60 06/07/2023    HDL 40 06/07/2023    LDL 90 06/07/2023      Lab Results   Component Value Date    WBC 8.58 06/08/2023    RBC 4.93 06/08/2023    HGB 15.7 06/08/2023    HCT 47.8 06/08/2023    MCV 97.0 06/08/2023     (L) 06/08/2023      Lab Results   Component Value Date    TSH 1.550 06/06/2023        Advance Care Planning   ACP discussion was held with the patient during this visit. Patient does not have an advance directive, declines further assistance.         Procedures      Assessment:    ICD-10-CM ICD-9-CM   1. AVD (aortic valve disease)  I35.9 424.1   2. Carotid bruit, unspecified laterality  R09.89 785.9   3. Essential hypertension  I10 401.9   4. Hypercholesterolemia  E78.00 272.0         Plan:  TERRI planned for 11/21/2023 to evaluate source of embolus  Continue on aspirin 81 mg for antiplatelet therapy.   May restart Mobic for arthritis  Continue on atorvastatin 80 mg daily for hyperlipidemia.   Continue on Zetia 10 mg daily for hyperlipidemia.   Continue on losartan 25 mg daily for hypertension.   Continue on Omega-3 fatty acids for hyperlipidemia.  Continue all other current medications.  F/up on 02/14/2024, sooner if needed.      Scribed for Dorie White MD by Laura Gregory. 11/9/2023 11:16 EST    I Dorie White MD personally performed the services described in this documentation as scribed by the above individual in my presence, and it is both accurate and complete.    Dorie White MD, FACC

## 2023-11-09 NOTE — PROGRESS NOTES
"[facility] Cardiology    Patient ID: Ron Villa is a 68 y.o. male.  : 1955   Contact: [phone]    Encounter date: 2023    PCP: Geovanni Greene MD      Chief complaint:   Chief Complaint   Patient presents with    AVD (aortic valve disease       Problem List:  Aortic stenosis  \"Heart murmur\" x 20 years   Symptoms of worsening chest pain and shortness of breath summer   Stress MPS 2021: normal LVEF, small-sized mild degree of ischemia located in inferior wall, \"low-risk study\"  Echo 2021: EF 66-70%. Moderate AV calcification. Severe AS (peak velocity 408cm/sec, mean gradient 34mmHg, peak gradient 67 mmHg). Mild MR.  R/LHC, 2021: Normal LVSF and coronary arteries. Severe aortic stenosis. Near normal hemodynamics.    TERRI, 2021: Pre-TAVR. EF 55-60%. LVH. Trace MR and TR. Mild AI and AV appears bicuspid. Severe AS, pk/mn gradient 40/22mmHg. DENNY by continuity 0.79cm2. Aorta grade 3/5 atheroma.   TAVR/TERRI, 2021 (Dr. White): EF 60%. Placement  of a 29 mm Nix ANTHONY 3 pericardial prosthesis: small paravalvular leak is seen beneath the right coronary leaflet. Mean gradient 7 mmHg. Calculated valve area 2.4 cm². <1cm pericardial effusion, unchanged following procedure.   Echocardiogram, 2021: AV mean gradient 16 mmHg.  Normal LVEF. (Mean gradient was 7 mm at the time of implantation.)    Echocardiogram, 2022: EF 60%. Pericardial prosthesis is present in the aortic position. AV mean gradient 16 mmHg. No paravalvular leak seen. Started on Coumadin  Echo 22, [facility]: EF 60%, 29 mm ANTHONY 3 pericardial prothesis. Ao mean PG 12 mmHg.   Treadmill stress test, 2022; The patient reported shortness of breath but no chest discomfort during exercise. Oxygen saturation 97% with exercise. THR of 130 bpm met at 6:16. Mild reduction in exercise tolerance. Expected exercise duration 7:40. Actual exercise duration 6:20. Test stopped " due to patient's inability to keep up with the pace and shortness of breath. Normal blood pressure response to exercise. No ST segment shift from baseline was seen with exercise. No evidence of inducible ischemia by clinical or electrocardiographic criteria.  Echocardiogram 8/11/2022: Normal LVEF.  29 mm ANTHONY 3 TAVR valve gradient 13 mmHg.  Echocardiogram, 12/05/2022; EF 65%. 29 mm Anthony 3 TAVR. Aortic valve area is 1.3 cm2. Mean gradient 15 mmHg. Trace TR. RVSP 18 mmHg.   Carotid US, 06/06/2023: No evidence of hemodynamically significant plaques or stenosis within the carotid system at this time.  Echo, 06/07/2023: EF 60%. Left ventricular diastolic function is consistent with (grade I) impaired relaxation. Mild AV stenosis.  MPS, 06/07/2023: EF 68%. Normal with no evidence of ischemia.  MCOT, 08/30/2023: SR/SB. No PACs. Frequent PVCs. Avg rate 67 bpm.  Osteoarthritis   DANNY on CPAP     No Known Allergies    Current Medications:    Current Outpatient Medications:     atorvastatin (LIPITOR) 80 MG tablet, Take 1 tablet by mouth Every Night., Disp: 30 tablet, Rfl: 0    Baclofen 5 MG tablet, Take 1 tablet by mouth As Needed. PRN, Disp: , Rfl:     Cholecalciferol (VITAMIN D3 PO), Take  by mouth Daily., Disp: , Rfl:     ezetimibe (Zetia) 10 MG tablet, Take 1 tablet by mouth Daily., Disp: 30 tablet, Rfl: 0    fexofenadine (ALLEGRA) 180 MG tablet, Take 1 tablet by mouth Daily., Disp: , Rfl:     losartan (Cozaar) 25 MG tablet, Take 1 tablet by mouth Daily., Disp: 30 tablet, Rfl: 11    Misc Natural Products (OSTEO BI-FLEX TRIPLE STRENGTH PO), Take 1 tablet by mouth Daily., Disp: , Rfl:     multivitamin with minerals tablet tablet, Take 1 tablet by mouth Daily., Disp: , Rfl:     Omega-3 Fatty Acids (FISH OIL PO), Take 1,400 mg by mouth Daily With Breakfast., Disp: , Rfl:     pantoprazole (PROTONIX) 40 MG EC tablet, Take 1 tablet by mouth Daily. \, Disp: , Rfl:     vitamin B-12 (CYANOCOBALAMIN) 1000 MCG tablet, Take 1 tablet  "by mouth Daily., Disp: , Rfl:     vitamin C (ASCORBIC ACID) 250 MG tablet, Take 2 tablets by mouth Daily., Disp: , Rfl:     Vitamin E 180 MG (400 UNIT) capsule capsule, Take 1 capsule by mouth Daily., Disp: , Rfl:     HPI    Ron Villa is a 68 y.o. male who presents today for a follow up of aortic valve disease and cardiac risk factors. Since last visit, patient has been doing well overall from a cardiovascular standpoint. He has an upcoming TERRI on November 21st. Patient expresses anxiety with his upcoming TERRI due to a past unpleasant experience. He questions if he can start taking his Mobic again for his arthritis and was reassured that he can. Patient's wife states that the morning of his stroke the patient had taken 300 mg of Viagra before exercising and wonders if that was the cause of his stroke and was reassured it is not likely. He denies chest pain, shortness of breath, orthopnea, palpitations, edema, dizziness, and syncope.        The following portions of the patient's history were reviewed and updated as appropriate: allergies, current medications and problem list.    Pertinent positives as listed in the HPI.  All other systems reviewed are negative.         Vitals:    11/09/23 1109   BP: 122/82   BP Location: Left arm   Patient Position: Sitting   Pulse: 60   SpO2: 97%   Weight: 115 kg (254 lb 3.2 oz)   Height: 190.5 cm (75\")       Physical Exam:  General: Alert and oriented.  Neck: Jugular venous pressure is within normal limits. Carotids have normal upstrokes without bruits.   Cardiovascular: Heart has a nondisplaced focal PMI. Regular rate and rhythm. No murmur, gallop or rub.  Lungs: Clear, no rales or wheezes. Equal expansion is noted.   Extremities: Show no edema.  Skin: Warm and dry.  Neurologic: Nonfocal.     Diagnostic Data (reviewed with patient):  Lab Results   Component Value Date    GLUCOSE 113 (H) 06/08/2023    BUN 12 06/08/2023    CREATININE 0.87 06/08/2023    BCR 13.8 06/08/2023    NA " 140 06/08/2023    K 3.8 06/08/2023     06/08/2023    CO2 23.2 06/08/2023    CALCIUM 8.8 06/08/2023    ALBUMIN 3.9 06/08/2023    ALKPHOS 47 06/08/2023    AST 35 06/08/2023    ALT 34 06/08/2023     Lab Results   Component Value Date    CHOL 142 06/07/2023    TRIG 60 06/07/2023    HDL 40 06/07/2023    LDL 90 06/07/2023      Lab Results   Component Value Date    WBC 8.58 06/08/2023    RBC 4.93 06/08/2023    HGB 15.7 06/08/2023    HCT 47.8 06/08/2023    MCV 97.0 06/08/2023     (L) 06/08/2023      Lab Results   Component Value Date    TSH 1.550 06/06/2023        Advance Care Planning   ACP discussion was held with the patient during this visit. Patient does not have an advance directive, declines further assistance.         Procedures      Assessment:    ICD-10-CM ICD-9-CM   1. AVD (aortic valve disease)  I35.9 424.1   2. Carotid bruit, unspecified laterality  R09.89 785.9   3. Essential hypertension  I10 401.9   4. Hypercholesterolemia  E78.00 272.0         Plan:  TERRI planned for 11/21/2023 to evaluate source of embolus  Continue on aspirin 81 mg for antiplatelet therapy.   May restart Mobic for arthritis  Continue on atorvastatin 80 mg daily for hyperlipidemia.   Continue on Zetia 10 mg daily for hyperlipidemia.   Continue on losartan 25 mg daily for hypertension.   Continue on Omega-3 fatty acids for hyperlipidemia.  Continue all other current medications.  F/up on 02/14/2024, sooner if needed.      Scribed for Dorie White MD by Laura Gregory. 11/9/2023 11:16 EST    I Dorie White MD personally performed the services described in this documentation as scribed by the above individual in my presence, and it is both accurate and complete.    Dorie White MD, FACC

## 2023-11-21 ENCOUNTER — HOSPITAL ENCOUNTER (OUTPATIENT)
Dept: CARDIOLOGY | Facility: HOSPITAL | Age: 68
Discharge: HOME | End: 2023-11-21
Admitting: INTERNAL MEDICINE
Payer: MEDICARE

## 2023-11-21 VITALS
SYSTOLIC BLOOD PRESSURE: 120 MMHG | DIASTOLIC BLOOD PRESSURE: 73 MMHG | RESPIRATION RATE: 16 BRPM | OXYGEN SATURATION: 94 % | HEART RATE: 68 BPM

## 2023-11-21 DIAGNOSIS — I35.0 AORTIC STENOSIS, SEVERE: ICD-10-CM

## 2023-11-21 DIAGNOSIS — I63.40 CEREBROVASCULAR ACCIDENT (CVA) DUE TO EMBOLISM OF CEREBRAL ARTERY: ICD-10-CM

## 2023-11-21 PROCEDURE — 25010000002 MIDAZOLAM PER 1 MG: Performed by: INTERNAL MEDICINE

## 2023-11-21 PROCEDURE — 25010000002 FENTANYL CITRATE (PF) 50 MCG/ML SOLUTION: Performed by: INTERNAL MEDICINE

## 2023-11-21 PROCEDURE — 93325 DOPPLER ECHO COLOR FLOW MAPG: CPT

## 2023-11-21 PROCEDURE — 93320 DOPPLER ECHO COMPLETE: CPT

## 2023-11-21 RX ORDER — MIDAZOLAM HYDROCHLORIDE 1 MG/ML
INJECTION INTRAMUSCULAR; INTRAVENOUS
Status: COMPLETED | OUTPATIENT
Start: 2023-11-21 | End: 2023-11-21

## 2023-11-21 RX ORDER — FENTANYL CITRATE 50 UG/ML
INJECTION, SOLUTION INTRAMUSCULAR; INTRAVENOUS
Status: COMPLETED | OUTPATIENT
Start: 2023-11-21 | End: 2023-11-21

## 2023-11-21 RX ADMIN — MIDAZOLAM 2 MG: 1 INJECTION INTRAMUSCULAR; INTRAVENOUS at 11:22

## 2023-11-21 RX ADMIN — MIDAZOLAM 2 MG: 1 INJECTION INTRAMUSCULAR; INTRAVENOUS at 11:19

## 2023-11-21 RX ADMIN — FENTANYL CITRATE 50 MCG: 50 INJECTION, SOLUTION INTRAMUSCULAR; INTRAVENOUS at 11:19

## 2023-11-21 RX ADMIN — FENTANYL CITRATE 50 MCG: 50 INJECTION, SOLUTION INTRAMUSCULAR; INTRAVENOUS at 11:22

## 2023-11-21 NOTE — INTERVAL H&P NOTE
H&P reviewed. The patient was examined and there are no changes to the H&P.      Dorie White MD, FACC

## 2023-11-22 LAB
BH CV ECHO MEAS - AO MAX PG: 23.6 MMHG
BH CV ECHO MEAS - AO MEAN PG: 14 MMHG
BH CV ECHO MEAS - AO V2 MAX: 243 CM/SEC
BH CV ECHO MEAS - AO V2 VTI: 52.9 CM
BH CV ECHO MEAS - AVA(I,D): 1.81 CM2
BH CV ECHO MEAS - LV MAX PG: 4.2 MMHG
BH CV ECHO MEAS - LV MEAN PG: 2 MMHG
BH CV ECHO MEAS - LV V1 MAX: 102 CM/SEC
BH CV ECHO MEAS - LV V1 VTI: 23.1 CM
BH CV ECHO MEAS - LVOT AREA: 4.2 CM2
BH CV ECHO MEAS - LVOT DIAM: 2.3 CM
BH CV ECHO MEAS - SV(LVOT): 96 ML
LV EF 2D ECHO EST: 55 %

## 2023-11-29 RX ORDER — LOSARTAN POTASSIUM 25 MG/1
25 TABLET ORAL DAILY
Qty: 90 TABLET | Refills: 3 | Status: SHIPPED | OUTPATIENT
Start: 2023-11-29

## 2023-12-01 ENCOUNTER — TELEPHONE (OUTPATIENT)
Dept: CARDIOLOGY | Facility: CLINIC | Age: 68
End: 2023-12-01
Payer: MEDICARE

## 2023-12-01 NOTE — TELEPHONE ENCOUNTER
----- Message from Dorie White MD sent at 11/25/2023 11:11 AM EST -----  Consider loop.  I have not reached Mr. Villa by phone about loop but TERRI was negative and 30 d monitor - for afib.  He has DANNY  on CPAP.  We may have just missed his afib.

## 2023-12-01 NOTE — TELEPHONE ENCOUNTER
Spoke with patient.  Results relayed to him.  Explained a loop recorder to him.  He states at this time he is not interested in this procedure.  He is instructed of follow up appointment in February.  He verbalizes understanding.

## 2023-12-11 NOTE — PROGRESS NOTES
Follow Up Office Visit      Encounter Date: 12/15/2023   Patient Name: Ron Villa  : 1955   MRN: [id number]   PCP: Goevanni Greene MD    Chief Complaint:    Chief Complaint   Patient presents with    Follow-up     History of stroke       History of Present Illness: Ron Villa is a 68 y.o. male with known medical diagnoses of aortic stenosis s/p TAVR, arthritis, GERD, prediabetes, DANNY, and recent stroke presents today for follow up.    Initial Event 2023:  Patient presented to ED on 2023 with generalized weakness, nausea/vomiting.  During course of work-up he had CT head which showed low attenuation in left medial inferior cerebellar hemisphere that was felt to be involving infarction, late acute/early subacute, this was later confirmed to be subacute on MRI brain.  CTA head did not show any evidence of LVO.  Bilateral carotid ultrasound negative for any flow-limiting stenosis.  TTE showed EF 56 to 60%, mild aortic stenosis.  He also had an EEG performed which was reported as being normal study.  Patient was discharged home on DAPT with Plavix and aspirin for 3 weeks to be followed by aspirin monotherapy along with high intensity statin and Zetia for secondary stroke prevention.     OV 2023:  Today he denies any ataxia, no dizziness, no nausea.  He states he will have an occasional headache.  He states his vision seems somewhat blurry, especially in the morning.  He reports that he feels 90% back to his normal self.  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  He is fully compliant with his medications, he has not had any issues with cost.  He denies any new stroke/TIA symptoms.     OV 12/15/2023:  He denies any ongoing stroke symptoms including ataxia/nausea, he will occasionally feel somewhat off balance. 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  He is compliant with his medications, no  issues with cost.  He denies any new stroke/TIA symptoms.  He has since followed up with Dr. White cardiology.  Repeat TERRI on 11/21/2023 showed EF 55%, negative bubble study, no cardiac source of embolism was seen.  30-day monitoring negative for A-fib.  She spoke with the patient regarding a loop recorder to look for A-fib however that time he declined.  He can raise a question on whether his stroke was caused by taking Viagra.  My told him that is not a medication typically associated with strokes.      Subjective        I have reviewed and the following portions of the patient's history were updated as appropriate: past family history, past medical history, past social history, past surgical history and problem list.    Medications:     Current Outpatient Medications:     aspirin 81 MG EC tablet, Take 1 tablet by mouth Daily., Disp: , Rfl:     atorvastatin (LIPITOR) 80 MG tablet, Take 1 tablet by mouth Every Night., Disp: 30 tablet, Rfl: 0    Baclofen 5 MG tablet, Take 1 tablet by mouth As Needed. PRN, Disp: , Rfl:     Cholecalciferol (VITAMIN D3 PO), Take  by mouth Daily., Disp: , Rfl:     ezetimibe (Zetia) 10 MG tablet, Take 1 tablet by mouth Daily., Disp: 30 tablet, Rfl: 0    fexofenadine (ALLEGRA) 180 MG tablet, Take 1 tablet by mouth Daily., Disp: , Rfl:     losartan (Cozaar) 25 MG tablet, Take 1 tablet by mouth Daily., Disp: 90 tablet, Rfl: 3    Misc Natural Products (OSTEO BI-FLEX TRIPLE STRENGTH PO), Take 1 tablet by mouth Daily., Disp: , Rfl:     multivitamin with minerals tablet tablet, Take 1 tablet by mouth Daily., Disp: , Rfl:     Omega-3 Fatty Acids (FISH OIL PO), Take 1,400 mg by mouth Daily With Breakfast., Disp: , Rfl:     pantoprazole (PROTONIX) 40 MG EC tablet, Take 1 tablet by mouth Daily. \, Disp: , Rfl:     vitamin B-12 (CYANOCOBALAMIN) 1000 MCG tablet, Take 1 tablet by mouth Daily., Disp: , Rfl:     vitamin C (ASCORBIC ACID) 250 MG tablet, Take 2 tablets by mouth Daily., Disp: ,  "Rfl:     Vitamin E 180 MG (400 UNIT) capsule capsule, Take 1 capsule by mouth Daily., Disp: , Rfl:     Allergies:   No Known Allergies    Review of Systems   Constitutional:  Positive for fatigue.   HENT: Negative.     Eyes:  Positive for visual disturbance.   Respiratory:  Positive for shortness of breath.    Cardiovascular:  Positive for palpitations.   Musculoskeletal:  Positive for arthralgias and back pain.   Neurological:  Positive for dizziness.   Psychiatric/Behavioral: Negative.          Objective     Physical Exam:   Vital Signs:   Vitals:    12/15/23 0812   BP: 140/78   BP Location: Right arm   Patient Position: Sitting   Cuff Size: Small Adult   Pulse: 65   SpO2: 97%   Weight: 118 kg (260 lb 3.2 oz)   Height: 190.5 cm (75\")     Body mass index is 32.52 kg/m².    Physical Exam  Constitutional:       General: He is awake. He is not in acute distress.     Appearance: He is obese. He is not ill-appearing.   HENT:      Head: Normocephalic.      Mouth/Throat:      Mouth: Mucous membranes are moist.      Pharynx: Oropharynx is clear.   Eyes:      General: Lids are normal.      Extraocular Movements: Extraocular movements intact.      Pupils: Pupils are equal, round, and reactive to light.   Cardiovascular:      Rate and Rhythm: Normal rate.   Pulmonary:      Effort: Pulmonary effort is normal. No respiratory distress.   Musculoskeletal:         General: Normal range of motion.   Skin:     General: Skin is warm and dry.   Neurological:      General: No focal deficit present.      Mental Status: He is alert.      Motor: Motor strength is normal.     Coordination: Romberg sign negative.   Psychiatric:         Mood and Affect: Mood normal.         Speech: Speech normal.         Behavior: Behavior normal.       Neurological Exam  Mental Status  Awake and alert. Oriented to person, place, time and situation. Recent and remote memory are intact. Speech is normal. Language is fluent with no aphasia. Attention and " concentration are normal. Fund of knowledge is appropriate for level of education.    Cranial Nerves  CN II: Visual fields full to confrontation.  CN III, IV, VI: Extraocular movements intact bilaterally. Normal lids and orbits bilaterally. Pupils equal round and reactive to light bilaterally.  CN V: Facial sensation is normal.  CN VII: Full and symmetric facial movement.  CN IX, X: Palate elevates symmetrically  CN XI: Shoulder shrug strength is normal.  CN XII: Tongue midline without atrophy or fasciculations.    Motor  Normal muscle bulk throughout. Normal muscle tone. No abnormal involuntary movements. Strength is 5/5 throughout all four extremities.    Sensory  Light touch is normal in upper and lower extremities.     Coordination  Right: Finger-to-nose normal.Left: Finger-to-nose normal.    Gait  Casual gait is normal including stance, stride, and arm swing. Romberg is absent.       Procedures    NIH Stroke Scale    1a  Level of consciousness: 0=alert; keenly responsive   1b. LOC questions:  0=Answers both questions correctly    1c. LOC commands: 0=Performs both tasks correctly   2.  Best Gaze: 0=normal   3. Visual: 0=No visual loss   4. Facial Palsy: 0=Normal symmetric movement   5a. Motor left arm: 0=No drift, limb holds 90 (or 45) degrees for full 10 seconds   5b.  Motor right arm: 0=No drift, limb holds 90 (or 45) degrees for full 10 seconds   6a. Motor left le=No drift, limb holds 90 (or 45) degrees for full 10 seconds   6b  Motor right le=No drift, limb holds 90 (or 45) degrees for full 10 seconds   7. Limb Ataxia: 0=Absent   8.  Sensory: 0=Normal; no sensory loss   9. Best Language:  0=No aphasia, normal   10. Dysarthria: 0=Normal   11. Extinction and Inattention: 0=No abnormality    Total:   0         Modified Calvert Scale: 1          0  No Symptoms    1 No significant disability. Able to carry out all usual activities, despite some symptoms.    2 Slight disability. Able to look after own  affairs without assistance, but unable to carry out all previous activities.    3 Moderate disability. Requires some help, but able to walk unassisted.    4 Moderately severe disability. Unable to attend to own bodily needs without assistance, and unable to walk unassisted.    5 Severe disability. Requires constant nursing care and attention, bedridden, incontinent.    6 Dead          PHQ-9 Depression Screening  Little interest or pleasure in doing things? 0-->not at all   Feeling down, depressed, or hopeless? 0-->not at all   Trouble falling or staying asleep, or sleeping too much? 0-->not at all   Feeling tired or having little energy? 1-->several days   Poor appetite or overeating? 0-->not at all   Feeling bad about yourself - or that you are a failure or have let yourself or your family down? 0-->not at all   Trouble concentrating on things, such as reading the newspaper or watching television? 0-->not at all   Moving or speaking so slowly that other people could have noticed? Or the opposite - being so fidgety or restless that you have been moving around a lot more than usual? 1-->several days   Thoughts that you would be better off dead, or of hurting yourself in some way? 0-->not at all   PHQ-9 Total Score 2   If you checked off any problems, how difficult have these problems made it for you to do your work, take care of things at home, or get along with other people?        Imaging Resluts:     No radiology results for the last 90 days.     Laboratory Results:    Lab Results   Component Value Date    HGBA1C 5.80 (H) 06/06/2023     Lab Results   Component Value Date    WBC 8.58 06/08/2023    HGB 15.7 06/08/2023    HCT 47.8 06/08/2023    MCV 97.0 06/08/2023     (L) 06/08/2023      Lab Results   Component Value Date    GLUCOSE 113 (H) 06/08/2023    BUN 12 06/08/2023    CREATININE 0.87 06/08/2023    EGFRIFNONA 98 11/09/2021    BCR 13.8 06/08/2023    K 3.8 06/08/2023    CO2 23.2 06/08/2023    CALCIUM 8.8  "06/08/2023    ALBUMIN 3.9 06/08/2023    AST 35 06/08/2023    ALT 34 06/08/2023      Lab Results   Component Value Date    CHOL 142 06/07/2023    CHOL 167 08/27/2021     Lab Results   Component Value Date    TRIG 60 06/07/2023    TRIG 89 08/27/2021     Lab Results   Component Value Date    HDL 40 06/07/2023    HDL 39 (L) 08/27/2021     Lab Results   Component Value Date    LDL 90 06/07/2023     (H) 08/27/2021      No results found for: \"PQQQODTZ12\"  No results found for: \"FOLATE\"  Lab Results   Component Value Date    TSH 1.550 06/06/2023       Assessment / Plan      Assessment/Plan:   Diagnoses and all orders for this visit:    1. History of stroke (Primary)    2. TAVR 11/08/21    3. Prediabetes    4. AVD (aortic valve disease)    5. DANNY (obstructive sleep apnea)       -Acute stroke to left cerebellum 06/2023, embolic appearing.  No hx of A-fib, is s/p TAVR, etiology undetermined.  TERRI and repeat neg for cardiac source of emboli, no a fib on 30 day monitoring.  Etiology remains unclear.  -Continue aspirin 81 mg daily  -Continue atorvastatin and Zetia  -BP today 140/78  -Stroke labs per PCP, reminded him of goals.  A1c < 7.0 and LDL < 70  -He is compliant with his CPAP  -He will reconsider loop recorder and discuss at his follow visit with CT surgery in February.  Explained that this is not an absolute necessary procedure however me give us more insight into what caused his stroke and could help with preventing further strokes if he found to have paroxysmal A-fib.  He tells me he occasionally has palpitations and his shortness of breath    Discussed the importance of medication compliance (asa/statin/zetia) and lifestyle modifications (adequate blood pressure control, adequate control of hyperlipidemia, adequate glycemic control, increase physical activity, and healthy diet) to help reduce the risk of future cerebrovascular events.  Also discussed the signs symptoms that would warrant the patient return back " to the emergency department including unilateral weakness, unilateral numbness, visual disturbances, loss of balance, speech difficulties, and/or a sudden severe headache.      Blood Pressure control to < 130/80  Goal LDL <70-recommend high dose statins  Serum Glucose < 140  Call 911 for any stroke symptoms    Follow Up:   Return in about 6 months (around 6/15/2024).      [facility]    This document has been electronically signed by BAL Melo  December 15, 2023 08:58 EST    Please note that portions of this note were completed with a voice recognition program. Efforts were made to edit dictation, but occasionally words are mistranscribed.

## 2023-12-15 ENCOUNTER — OFFICE VISIT (OUTPATIENT)
Dept: NEUROLOGY | Facility: CLINIC | Age: 68
End: 2023-12-15
Payer: MEDICARE

## 2023-12-15 VITALS
BODY MASS INDEX: 32.35 KG/M2 | HEART RATE: 65 BPM | HEIGHT: 75 IN | SYSTOLIC BLOOD PRESSURE: 140 MMHG | WEIGHT: 260.2 LBS | OXYGEN SATURATION: 97 % | DIASTOLIC BLOOD PRESSURE: 78 MMHG

## 2023-12-15 DIAGNOSIS — G47.33 OSA (OBSTRUCTIVE SLEEP APNEA): ICD-10-CM

## 2023-12-15 DIAGNOSIS — I35.0 AORTIC STENOSIS, SEVERE: ICD-10-CM

## 2023-12-15 DIAGNOSIS — Z86.73 HISTORY OF STROKE: Primary | ICD-10-CM

## 2023-12-15 DIAGNOSIS — R73.03 PREDIABETES: ICD-10-CM

## 2023-12-15 DIAGNOSIS — I35.9 AVD (AORTIC VALVE DISEASE): ICD-10-CM

## 2024-01-11 DIAGNOSIS — I35.9 AVD (AORTIC VALVE DISEASE): ICD-10-CM

## 2024-01-11 DIAGNOSIS — I35.0 AORTIC STENOSIS, SEVERE: Primary | ICD-10-CM

## 2024-02-21 ENCOUNTER — OFFICE VISIT (OUTPATIENT)
Dept: CARDIOLOGY | Facility: CLINIC | Age: 69
End: 2024-02-21
Payer: MEDICARE

## 2024-02-21 ENCOUNTER — HOSPITAL ENCOUNTER (OUTPATIENT)
Dept: CARDIOLOGY | Facility: HOSPITAL | Age: 69
Discharge: HOME | End: 2024-02-21
Admitting: INTERNAL MEDICINE
Payer: MEDICARE

## 2024-02-21 ENCOUNTER — PREP FOR SURGERY (OUTPATIENT)
Dept: OTHER | Facility: HOSPITAL | Age: 69
End: 2024-02-21
Payer: MEDICARE

## 2024-02-21 VITALS
HEART RATE: 77 BPM | OXYGEN SATURATION: 96 % | DIASTOLIC BLOOD PRESSURE: 70 MMHG | HEIGHT: 74 IN | WEIGHT: 260 LBS | BODY MASS INDEX: 33.37 KG/M2 | SYSTOLIC BLOOD PRESSURE: 120 MMHG

## 2024-02-21 VITALS
BODY MASS INDEX: 32.33 KG/M2 | DIASTOLIC BLOOD PRESSURE: 69 MMHG | SYSTOLIC BLOOD PRESSURE: 134 MMHG | WEIGHT: 260 LBS | HEIGHT: 75 IN

## 2024-02-21 DIAGNOSIS — I63.40 CEREBROVASCULAR ACCIDENT (CVA) DUE TO EMBOLISM OF CEREBRAL ARTERY: ICD-10-CM

## 2024-02-21 DIAGNOSIS — I35.9 AVD (AORTIC VALVE DISEASE): ICD-10-CM

## 2024-02-21 DIAGNOSIS — I35.9 AVD (AORTIC VALVE DISEASE): Primary | ICD-10-CM

## 2024-02-21 DIAGNOSIS — R09.89 CAROTID BRUIT, UNSPECIFIED LATERALITY: ICD-10-CM

## 2024-02-21 DIAGNOSIS — E78.00 HYPERCHOLESTEROLEMIA: ICD-10-CM

## 2024-02-21 DIAGNOSIS — I10 ESSENTIAL HYPERTENSION: ICD-10-CM

## 2024-02-21 DIAGNOSIS — I35.0 AORTIC STENOSIS, SEVERE: ICD-10-CM

## 2024-02-21 LAB
BH CV ECHO MEAS - AO MAX PG: 23.5 MMHG
BH CV ECHO MEAS - AO MEAN PG: 13.8 MMHG
BH CV ECHO MEAS - AO ROOT DIAM: 2.8 CM
BH CV ECHO MEAS - AO V2 MAX: 242.3 CM/SEC
BH CV ECHO MEAS - AO V2 VTI: 54 CM
BH CV ECHO MEAS - AVA(I,D): 1.5 CM2
BH CV ECHO MEAS - EDV(CUBED): 132.7 ML
BH CV ECHO MEAS - EDV(MOD-SP2): 206 ML
BH CV ECHO MEAS - EDV(MOD-SP4): 130 ML
BH CV ECHO MEAS - EF(MOD-BP): 58 %
BH CV ECHO MEAS - EF(MOD-SP2): 59.3 %
BH CV ECHO MEAS - EF(MOD-SP4): 55.6 %
BH CV ECHO MEAS - ESV(CUBED): 38.4 ML
BH CV ECHO MEAS - ESV(MOD-SP2): 83.8 ML
BH CV ECHO MEAS - ESV(MOD-SP4): 57.7 ML
BH CV ECHO MEAS - FS: 33.8 %
BH CV ECHO MEAS - IVS/LVPW: 1 CM
BH CV ECHO MEAS - IVSD: 1 CM
BH CV ECHO MEAS - LV DIASTOLIC VOL/BSA (35-75): 53 CM2
BH CV ECHO MEAS - LV MASS(C)D: 188 GRAMS
BH CV ECHO MEAS - LV MAX PG: 4.1 MMHG
BH CV ECHO MEAS - LV MEAN PG: 2 MMHG
BH CV ECHO MEAS - LV SYSTOLIC VOL/BSA (12-30): 23.5 CM2
BH CV ECHO MEAS - LV V1 MAX: 101 CM/SEC
BH CV ECHO MEAS - LV V1 VTI: 21.8 CM
BH CV ECHO MEAS - LVIDD: 5.1 CM
BH CV ECHO MEAS - LVIDS: 3.4 CM
BH CV ECHO MEAS - LVOT AREA: 3.1 CM2
BH CV ECHO MEAS - LVOT DIAM: 2.3 CM
BH CV ECHO MEAS - LVPWD: 1 CM
BH CV ECHO MEAS - SI(MOD-SP2): 49.8 ML/M2
BH CV ECHO MEAS - SI(MOD-SP4): 29.5 ML/M2
BH CV ECHO MEAS - SV(LVOT): 67.2 ML
BH CV ECHO MEAS - SV(MOD-SP2): 122.2 ML
BH CV ECHO MEAS - SV(MOD-SP4): 72.3 ML
LV EF 2D ECHO EST: 55 %

## 2024-02-21 PROCEDURE — 99214 OFFICE O/P EST MOD 30 MIN: CPT | Performed by: INTERNAL MEDICINE

## 2024-02-21 PROCEDURE — 93325 DOPPLER ECHO COLOR FLOW MAPG: CPT

## 2024-02-21 PROCEDURE — 93321 DOPPLER ECHO F-UP/LMTD STD: CPT | Performed by: INTERNAL MEDICINE

## 2024-02-21 PROCEDURE — 93308 TTE F-UP OR LMTD: CPT | Performed by: INTERNAL MEDICINE

## 2024-02-21 PROCEDURE — 93325 DOPPLER ECHO COLOR FLOW MAPG: CPT | Performed by: INTERNAL MEDICINE

## 2024-02-21 PROCEDURE — 1159F MED LIST DOCD IN RCRD: CPT | Performed by: INTERNAL MEDICINE

## 2024-02-21 PROCEDURE — 93321 DOPPLER ECHO F-UP/LMTD STD: CPT

## 2024-02-21 PROCEDURE — 93308 TTE F-UP OR LMTD: CPT

## 2024-02-21 PROCEDURE — 1160F RVW MEDS BY RX/DR IN RCRD: CPT | Performed by: INTERNAL MEDICINE

## 2024-02-21 RX ORDER — CEFAZOLIN SODIUM 2 G/100ML
2000 INJECTION, SOLUTION INTRAVENOUS ONCE
OUTPATIENT
Start: 2024-02-21 | End: 2024-02-21

## 2024-02-21 NOTE — PROGRESS NOTES
"[facility] Cardiology    Patient ID: Ron Villa is a 68 y.o. male.  : 1955   Contact: [phone]    Encounter date: 2024    PCP: Geovanni Greene MD      Chief complaint:   Chief Complaint   Patient presents with    Hypertension    AVD    Hyperlipidemia    cartoid bruit       Problem List:  Aortic stenosis  \"Heart murmur\" x 20 years   Symptoms of worsening chest pain and shortness of breath summer 2021  Stress MPS 2021: normal LVEF, small-sized mild degree of ischemia located in inferior wall, \"low-risk study\"  Echo 2021: EF 66-70%. Moderate AV calcification. Severe AS (peak velocity 408cm/sec, mean gradient 34mmHg, peak gradient 67 mmHg). Mild MR.  R/LHC, 2021: Normal LVSF and coronary arteries. Severe aortic stenosis. Near normal hemodynamics.    TERRI, 2021: Pre-TAVR. EF 55-60%. LVH. Trace MR and TR. Mild AI and AV appears bicuspid. Severe AS, pk/mn gradient 40/22mmHg. DENNY by continuity 0.79cm2. Aorta grade 3/5 atheroma.   TAVR/TERRI, 2021 (Dr. White): EF 60%. Placement  of a 29 mm Nix ANTHONY 3 pericardial prosthesis: small paravalvular leak is seen beneath the right coronary leaflet. Mean gradient 7 mmHg. Calculated valve area 2.4 cm². <1cm pericardial effusion, unchanged following procedure.   Echocardiogram, 2021: AV mean gradient 16 mmHg.  Normal LVEF. (Mean gradient was 7 mm at the time of implantation.)    Echocardiogram, 2022: EF 60%. Pericardial prosthesis is present in the aortic position. AV mean gradient 16 mmHg. No paravalvular leak seen. Started on Coumadin  Echo 22, [facility]: EF 60%, 29 mm ANTHONY 3 pericardial prothesis. Ao mean PG 12 mmHg.   Treadmill stress test, 2022; The patient reported shortness of breath but no chest discomfort during exercise. Oxygen saturation 97% with exercise. THR of 130 bpm met at 6:16. Mild reduction in exercise tolerance. Expected exercise duration 7:40. Actual exercise " duration 6:20. Test stopped due to patient's inability to keep up with the pace and shortness of breath. Normal blood pressure response to exercise. No ST segment shift from baseline was seen with exercise. No evidence of inducible ischemia by clinical or electrocardiographic criteria.  Echocardiogram 8/11/2022: Normal LVEF.  29 mm ANTHONY 3 TAVR valve gradient 13 mmHg.  Echocardiogram, 12/05/2022; EF 65%. 29 mm Anthony 3 TAVR. Aortic valve area is 1.3 cm2. Mean gradient 15 mmHg. Trace TR. RVSP 18 mmHg.   Carotid US, 06/06/2023: No evidence of hemodynamically significant plaques or stenosis within the carotid system at this time.  Echo, 06/07/2023: EF 60%. Left ventricular diastolic function is consistent with (grade I) impaired relaxation. Mild AV stenosis.  MPS, 06/07/2023: EF 68%. Normal with no evidence of ischemia.  MCOT, 08/30/2023: SR/SB. No PACs. Frequent PVCs. Avg rate 67 bpm.  TERRI, 11/21/2023: EF 55%. There is a 29 mm ANTHONY 3 pericardial prosthesis present in the aortic position. AV MPG is 14 mmHg. DENNY is 1.8 cm2. Trace MR.   Osteoarthritis   DANNY on CPAP     No Known Allergies    Current Medications:    Current Outpatient Medications:     aspirin 81 MG EC tablet, Take 1 tablet by mouth Daily., Disp: , Rfl:     atorvastatin (LIPITOR) 80 MG tablet, Take 1 tablet by mouth Every Night., Disp: 30 tablet, Rfl: 0    Baclofen 5 MG tablet, Take 1 tablet by mouth As Needed. PRN, Disp: , Rfl:     Cholecalciferol (VITAMIN D3 PO), Take  by mouth Daily., Disp: , Rfl:     ezetimibe (Zetia) 10 MG tablet, Take 1 tablet by mouth Daily., Disp: 30 tablet, Rfl: 0    fexofenadine (ALLEGRA) 180 MG tablet, Take 1 tablet by mouth Daily., Disp: , Rfl:     losartan (Cozaar) 25 MG tablet, Take 1 tablet by mouth Daily., Disp: 90 tablet, Rfl: 3    Misc Natural Products (OSTEO BI-FLEX TRIPLE STRENGTH PO), Take 1 tablet by mouth Daily., Disp: , Rfl:     multivitamin with minerals tablet tablet, Take 1 tablet by mouth Daily., Disp: , Rfl:  "    Omega-3 Fatty Acids (FISH OIL PO), Take 1,400 mg by mouth Daily With Breakfast., Disp: , Rfl:     pantoprazole (PROTONIX) 40 MG EC tablet, Take 1 tablet by mouth Daily. \, Disp: , Rfl:     vitamin B-12 (CYANOCOBALAMIN) 1000 MCG tablet, Take 1 tablet by mouth Daily., Disp: , Rfl:     vitamin C (ASCORBIC ACID) 250 MG tablet, Take 2 tablets by mouth Daily., Disp: , Rfl:     Vitamin E 180 MG (400 UNIT) capsule capsule, Take 1 capsule by mouth Daily., Disp: , Rfl:     HPI    Ron Villa is a 68 y.o. male who presents today for a 3 month follow up of AVD and cardiac risk factors. Since last visit, patient has been doing well overall from a cardiovascular standpoint. He stays busy and active by doing physical therapy exercises for his back and a foot bicycle. Patient notes an episode of blurry vision while backing his car out of his driveway yesterday morning that resolved after 30 seconds. He sleeps with his CPAP machine nightly. Patient denies chest pain, shortness of breath, orthopnea, palpitations, edema, dizziness, and syncope.       The following portions of the patient's history were reviewed and updated as appropriate: allergies, current medications and problem list.    Pertinent positives as listed in the HPI.  All other systems reviewed are negative.         Vitals:    02/21/24 1143   BP: 120/70   BP Location: Left arm   Patient Position: Sitting   Pulse: 77   SpO2: 96%   Weight: 118 kg (260 lb)   Height: 188 cm (74\")       Physical Exam:  General: Alert and oriented.  Neck: Jugular venous pressure is within normal limits. Carotids have normal upstrokes with soft left bruit  vs radiation of murmur..   Cardiovascular: Heart has a nondisplaced focal PMI. Regular rate and rhythm. 1/6 SE murmur, no gallop or rub.  Lungs: Clear, no rales or wheezes. Equal expansion is noted.   Extremities: Show no edema.  Skin: Warm and dry.  Neurologic: Nonfocal.     Diagnostic Data (reviewed with patient):  Lab Results "   Component Value Date    GLUCOSE 113 (H) 06/08/2023    BUN 12 06/08/2023    CREATININE 0.87 06/08/2023    BCR 13.8 06/08/2023     06/08/2023    K 3.8 06/08/2023     06/08/2023    CO2 23.2 06/08/2023    CALCIUM 8.8 06/08/2023    ALBUMIN 3.9 06/08/2023    ALKPHOS 47 06/08/2023    AST 35 06/08/2023    ALT 34 06/08/2023     Lab Results   Component Value Date    CHOL 142 06/07/2023    TRIG 60 06/07/2023    HDL 40 06/07/2023    LDL 90 06/07/2023      Lab Results   Component Value Date    WBC 8.58 06/08/2023    RBC 4.93 06/08/2023    HGB 15.7 06/08/2023    HCT 47.8 06/08/2023    MCV 97.0 06/08/2023     (L) 06/08/2023      Lab Results   Component Value Date    TSH 1.550 06/06/2023        Advance Care Planning   ACP discussion was held with the patient during this visit. Patient does not have an advance directive, declines further assistance.         Procedures      Assessment:    ICD-10-CM ICD-9-CM   1. AVD (aortic valve disease)  I35.9 424.1   2. Carotid bruit, unspecified laterality  R09.89 785.9   3. Essential hypertension  I10 401.9   4. Hypercholesterolemia  E78.00 272.0   Loop monitor placement was discussed extensively with the patient and his wife.  With this recent 32nd episode which may have represented a TIA and his history of sleep apnea I think it is important to rule out the possibility of atrial fibrillation.  The patient is agreeable to loop implantation.  This will be scheduled at the patient's convenience.      Plan:  Loop monitor implantation scheduled to monitor possible atrial fibrillation.  Begin routine aerobic exercise for at least 30 minutes 5 days per week.  Continue on aspirin 81 mg for antiplatelet therapy.   Continue on atorvastatin 80 mg daily for hyperlipidemia.   Continue on Zetia 10 mg daily for hyperlipidemia.  Continue on losartan 25 mg daily for hypertension.   Continue on Omega-3 fatty acids for hyperlipidemia.  Continue all other current medications.  F/up 6 months,  sooner if needed.      Scribed for Droie White MD by Laura Gregory. 2/21/2024 11:49 EST    I Dorie White MD personally performed the services described in this documentation as scribed by the above individual in my presence, and it is both accurate and complete.    Dorei White MD, FACC

## 2024-02-21 NOTE — H&P (VIEW-ONLY)
"[facility]    Patient ID: Ron Villa is a 68 y.o. male.  : 1955   Contact: [phone]    Encounter date: 2024    PCP: Geovanni Greene MD      Chief complaint:   Chief Complaint   Patient presents with    Hypertension    AVD    Hyperlipidemia    cartoid bruit       Problem List:  Aortic stenosis  \"Heart murmur\" x 20 years   Symptoms of worsening chest pain and shortness of breath summer 2021  Stress MPS 2021: normal LVEF, small-sized mild degree of ischemia located in inferior wall, \"low-risk study\"  Echo 2021: EF 66-70%. Moderate AV calcification. Severe AS (peak velocity 408cm/sec, mean gradient 34mmHg, peak gradient 67 mmHg). Mild MR.  R/LHC, 2021: Normal LVSF and coronary arteries. Severe aortic stenosis. Near normal hemodynamics.    TERRI, 2021: Pre-TAVR. EF 55-60%. LVH. Trace MR and TR. Mild AI and AV appears bicuspid. Severe AS, pk/mn gradient 40/22mmHg. DENNY by continuity 0.79cm2. Aorta grade 3/5 atheroma.   TAVR/TERRI, 2021 (Dr. White): EF 60%. Placement  of a 29 mm Nix ANTHONY 3 pericardial prosthesis: small paravalvular leak is seen beneath the right coronary leaflet. Mean gradient 7 mmHg. Calculated valve area 2.4 cm². <1cm pericardial effusion, unchanged following procedure.   Echocardiogram, 2021: AV mean gradient 16 mmHg.  Normal LVEF. (Mean gradient was 7 mm at the time of implantation.)    Echocardiogram, 2022: EF 60%. Pericardial prosthesis is present in the aortic position. AV mean gradient 16 mmHg. No paravalvular leak seen. Started on Coumadin  Echo 22, [facility]: EF 60%, 29 mm ANTHONY 3 pericardial prothesis. Ao mean PG 12 mmHg.   Treadmill stress test, 2022; The patient reported shortness of breath but no chest discomfort during exercise. Oxygen saturation 97% with exercise. THR of 130 bpm met at 6:16. Mild reduction in exercise tolerance. Expected exercise duration 7:40. Actual exercise " duration 6:20. Test stopped due to patient's inability to keep up with the pace and shortness of breath. Normal blood pressure response to exercise. No ST segment shift from baseline was seen with exercise. No evidence of inducible ischemia by clinical or electrocardiographic criteria.  Echocardiogram 8/11/2022: Normal LVEF.  29 mm ANTHONY 3 TAVR valve gradient 13 mmHg.  Echocardiogram, 12/05/2022; EF 65%. 29 mm Anthony 3 TAVR. Aortic valve area is 1.3 cm2. Mean gradient 15 mmHg. Trace TR. RVSP 18 mmHg.   Carotid US, 06/06/2023: No evidence of hemodynamically significant plaques or stenosis within the carotid system at this time.  Echo, 06/07/2023: EF 60%. Left ventricular diastolic function is consistent with (grade I) impaired relaxation. Mild AV stenosis.  MPS, 06/07/2023: EF 68%. Normal with no evidence of ischemia.  MCOT, 08/30/2023: SR/SB. No PACs. Frequent PVCs. Avg rate 67 bpm.  TERRI, 11/21/2023: EF 55%. There is a 29 mm ANTHONY 3 pericardial prosthesis present in the aortic position. AV MPG is 14 mmHg. DENNY is 1.8 cm2. Trace MR.   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 PRN, Disp: , Rfl:     Cholecalciferol (VITAMIN D3 PO), Take  by mouth Daily., Disp: , Rfl:     ezetimibe (Zetia) 10 MG tablet, Take 1 tablet by mouth Daily., Disp: 30 tablet, Rfl: 0    fexofenadine (ALLEGRA) 180 MG tablet, Take 1 tablet by mouth Daily., Disp: , Rfl:     losartan (Cozaar) 25 MG tablet, Take 1 tablet by mouth Daily., Disp: 90 tablet, Rfl: 3    Misc Natural Products (OSTEO BI-FLEX TRIPLE STRENGTH PO), Take 1 tablet by mouth Daily., Disp: , Rfl:      "multivitamin with minerals tablet tablet, Take 1 tablet by mouth Daily., Disp: , Rfl:     Omega-3 Fatty Acids (FISH OIL PO), Take 1,400 mg by mouth Daily With Breakfast., Disp: , Rfl:     pantoprazole (PROTONIX) 40 MG EC tablet, Take 1 tablet by mouth Daily. \, Disp: , Rfl:     vitamin B-12 (CYANOCOBALAMIN) 1000 MCG tablet, Take 1 tablet by mouth Daily., Disp: , Rfl:     vitamin C (ASCORBIC ACID) 250 MG tablet, Take 2 tablets by mouth Daily., Disp: , Rfl:     Vitamin E 180 MG (400 UNIT) capsule capsule, Take 1 capsule by mouth Daily., Disp: , Rfl:     HPI    Ron Villa is a 68 y.o. male who presents today for a 3 month follow up of AVD and cardiac risk factors. Since last visit, patient has been doing well overall from a cardiovascular standpoint. He stays busy and active by doing physical therapy exercises for his back and a foot bicycle. Patient notes an episode of blurry vision while backing his car out of his driveway yesterday morning that resolved after 30 seconds. He sleeps with his CPAP machine nightly. Patient denies chest pain, shortness of breath, orthopnea, palpitations, edema, dizziness, and syncope.       The following portions of the patient's history were reviewed and updated as appropriate: allergies, current medications and problem list.    Pertinent positives as listed in the HPI.  All other systems reviewed are negative.         Vitals:    02/21/24 1143   BP: 120/70   BP Location: Left arm   Patient Position: Sitting   Pulse: 77   SpO2: 96%   Weight: 118 kg (260 lb)   Height: 188 cm (74\")       Physical Exam:  General: Alert and oriented.  Neck: Jugular venous pressure is within normal limits. Carotids have normal upstrokes with soft left bruit  vs radiation of murmur..   Cardiovascular: Heart has a nondisplaced focal PMI. Regular rate and rhythm. 1/6 SE murmur, no gallop or rub.  Lungs: Clear, no rales or wheezes. Equal expansion is noted.   Extremities: Show no edema.  Skin: Warm and " dry.  Neurologic: Nonfocal.     Diagnostic Data (reviewed with patient):  Lab Results   Component Value Date    GLUCOSE 113 (H) 06/08/2023    BUN 12 06/08/2023    CREATININE 0.87 06/08/2023    BCR 13.8 06/08/2023     06/08/2023    K 3.8 06/08/2023     06/08/2023    CO2 23.2 06/08/2023    CALCIUM 8.8 06/08/2023    ALBUMIN 3.9 06/08/2023    ALKPHOS 47 06/08/2023    AST 35 06/08/2023    ALT 34 06/08/2023     Lab Results   Component Value Date    CHOL 142 06/07/2023    TRIG 60 06/07/2023    HDL 40 06/07/2023    LDL 90 06/07/2023      Lab Results   Component Value Date    WBC 8.58 06/08/2023    RBC 4.93 06/08/2023    HGB 15.7 06/08/2023    HCT 47.8 06/08/2023    MCV 97.0 06/08/2023     (L) 06/08/2023      Lab Results   Component Value Date    TSH 1.550 06/06/2023        Advance Care Planning   ACP discussion was held with the patient during this visit. Patient does not have an advance directive, declines further assistance.         Procedures      Assessment:    ICD-10-CM ICD-9-CM   1. AVD (aortic valve disease)  I35.9 424.1   2. Carotid bruit, unspecified laterality  R09.89 785.9   3. Essential hypertension  I10 401.9   4. Hypercholesterolemia  E78.00 272.0   Loop monitor placement was discussed extensively with the patient and his wife.  With this recent 2nd episode which may have represented a TIA and his history of sleep apnea I think it is important to rule out the possibility of atrial fibrillation.  The patient is agreeable to loop implantation.  This will be scheduled at the patient's convenience.      Plan:  Loop monitor implantation scheduled to monitor possible atrial fibrillation.  Begin routine aerobic exercise for at least 30 minutes 5 days per week.  Continue on aspirin 81 mg for antiplatelet therapy.   Continue on atorvastatin 80 mg daily for hyperlipidemia.   Continue on Zetia 10 mg daily for hyperlipidemia.  Continue on losartan 25 mg daily for hypertension.   Continue on Omega-3  fatty acids for hyperlipidemia.  Continue all other current medications.  F/up 6 months, sooner if needed.      Scribed for Dorei White MD by Laura Gregory. 2/21/2024 11:49 EST    I Dorie White MD personally performed the services described in this documentation as scribed by the above individual in my presence, and it is both accurate and complete.    Dorie White MD, FACC

## 2024-03-12 ENCOUNTER — HOSPITAL ENCOUNTER (OUTPATIENT)
Dept: CARDIOLOGY | Facility: HOSPITAL | Age: 69
Discharge: HOME | End: 2024-03-12
Attending: INTERNAL MEDICINE | Admitting: INTERNAL MEDICINE
Payer: MEDICARE

## 2024-03-12 VITALS
TEMPERATURE: 97.4 F | WEIGHT: 263 LBS | RESPIRATION RATE: 16 BRPM | OXYGEN SATURATION: 95 % | BODY MASS INDEX: 32.7 KG/M2 | HEART RATE: 58 BPM | HEIGHT: 75 IN | DIASTOLIC BLOOD PRESSURE: 88 MMHG | SYSTOLIC BLOOD PRESSURE: 144 MMHG

## 2024-03-12 DIAGNOSIS — I63.40 CEREBROVASCULAR ACCIDENT (CVA) DUE TO EMBOLISM OF CEREBRAL ARTERY: ICD-10-CM

## 2024-03-12 PROCEDURE — 36415 COLL VENOUS BLD VENIPUNCTURE: CPT

## 2024-03-12 PROCEDURE — 33285 INSJ SUBQ CAR RHYTHM MNTR: CPT

## 2024-03-12 PROCEDURE — C1764 EVENT RECORDER, CARDIAC: HCPCS

## 2024-03-12 PROCEDURE — 25010000002 CEFAZOLIN PER 500 MG: Performed by: PHYSICIAN ASSISTANT

## 2024-03-12 PROCEDURE — 25010000002 LIDOCAINE 1 % SOLUTION: Performed by: INTERNAL MEDICINE

## 2024-03-12 PROCEDURE — 33285 INSJ SUBQ CAR RHYTHM MNTR: CPT | Performed by: INTERNAL MEDICINE

## 2024-03-12 RX ORDER — LIDOCAINE HYDROCHLORIDE 10 MG/ML
INJECTION, SOLUTION INFILTRATION; PERINEURAL
Status: COMPLETED | OUTPATIENT
Start: 2024-03-12 | End: 2024-03-12

## 2024-03-12 RX ADMIN — SODIUM CHLORIDE 2000 MG: 900 INJECTION INTRAVENOUS at 13:25

## 2024-03-12 RX ADMIN — LIDOCAINE HYDROCHLORIDE 10 ML: 10 INJECTION, SOLUTION INFILTRATION; PERINEURAL at 13:31

## 2024-03-20 ENCOUNTER — OFFICE VISIT (OUTPATIENT)
Dept: CARDIOLOGY | Facility: CLINIC | Age: 69
End: 2024-03-20
Payer: MEDICARE

## 2024-03-20 DIAGNOSIS — R09.89 CAROTID BRUIT, UNSPECIFIED LATERALITY: ICD-10-CM

## 2024-03-20 DIAGNOSIS — I35.9 AVD (AORTIC VALVE DISEASE): Primary | ICD-10-CM

## 2024-03-20 DIAGNOSIS — E78.5 DYSLIPIDEMIA: ICD-10-CM

## 2024-03-20 DIAGNOSIS — I10 ESSENTIAL HYPERTENSION: ICD-10-CM

## 2024-03-20 PROCEDURE — 99024 POSTOP FOLLOW-UP VISIT: CPT | Performed by: INTERNAL MEDICINE

## 2024-03-20 PROCEDURE — 1159F MED LIST DOCD IN RCRD: CPT | Performed by: INTERNAL MEDICINE

## 2024-03-20 PROCEDURE — 1160F RVW MEDS BY RX/DR IN RCRD: CPT | Performed by: INTERNAL MEDICINE

## 2024-03-20 NOTE — PROGRESS NOTES
"[facility]    Patient ID: Ron Villa is a 68 y.o. male.  : 1955   Contact: [phone]    Encounter date: 2024    PCP: Geovanni Greene MD      Chief complaint: No chief complaint on file.      Problem List:  Aortic stenosis  \"Heart murmur\" x 20 years   Symptoms of worsening chest pain and shortness of breath summer 2021  Stress MPS 2021: normal LVEF, small-sized mild degree of ischemia located in inferior wall, \"low-risk study\"  Echo 2021: EF 66-70%. Moderate AV calcification. Severe AS (peak velocity 408cm/sec, mean gradient 34mmHg, peak gradient 67 mmHg). Mild MR.  R/LHC, 2021: Normal LVSF and coronary arteries. Severe aortic stenosis. Near normal hemodynamics.    TERRI, 2021: Pre-TAVR. EF 55-60%. LVH. Trace MR and TR. Mild AI and AV appears bicuspid. Severe AS, pk/mn gradient 40/22mmHg. DENNY by continuity 0.79cm2. Aorta grade 3/5 atheroma.   TAVR/TERRI, 2021 (Dr. White): EF 60%. Placement  of a 29 mm Nix ANTHONY 3 pericardial prosthesis: small paravalvular leak is seen beneath the right coronary leaflet. Mean gradient 7 mmHg. Calculated valve area 2.4 cm². <1cm pericardial effusion, unchanged following procedure.   Echocardiogram, 2021: AV mean gradient 16 mmHg.  Normal LVEF. (Mean gradient was 7 mm at the time of implantation.)    Echocardiogram, 2022: EF 60%. Pericardial prosthesis is present in the aortic position. AV mean gradient 16 mmHg. No paravalvular leak seen. Started on Coumadin  Echo 22, [facility]: EF 60%, 29 mm ANTHONY 3 pericardial prothesis. Ao mean PG 12 mmHg.   Treadmill stress test, 2022; The patient reported shortness of breath but no chest discomfort during exercise. Oxygen saturation 97% with exercise. THR of 130 bpm met at 6:16. Mild reduction in exercise tolerance. Expected exercise duration 7:40. Actual exercise duration 6:20. Test stopped due to patient's inability to keep up with " the pace and shortness of breath. Normal blood pressure response to exercise. No ST segment shift from baseline was seen with exercise. No evidence of inducible ischemia by clinical or electrocardiographic criteria.  Echocardiogram 8/11/2022: Normal LVEF.  29 mm ANTHONY 3 TAVR valve gradient 13 mmHg.  Echocardiogram, 12/05/2022; EF 65%. 29 mm Anthony 3 TAVR. Aortic valve area is 1.3 cm2. Mean gradient 15 mmHg. Trace TR. RVSP 18 mmHg.   Carotid US, 06/06/2023: No evidence of hemodynamically significant plaques or stenosis within the carotid system at this time.  Echo, 06/07/2023: EF 60%. Left ventricular diastolic function is consistent with (grade I) impaired relaxation. Mild AV stenosis.  MPS, 06/07/2023: EF 68%. Normal with no evidence of ischemia.  MCOT, 08/30/2023: SR/SB. No PACs. Frequent PVCs. Avg rate 67 bpm.  TERRI, 11/21/2023: EF 55%. There is a 29 mm ANTHONY 3 pericardial prosthesis present in the aortic position. AV MPG is 14 mmHg. DENNY is 1.8 cm2. Trace MR.   Echo, 02/21/2024: EF 55%. There is a 29 mm Anthony 3 TAVR valve present in the aortic position. AV mean PG is 14 mmHg. DENNY 1.5 cm2.  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  Osteoarthritis   DANNY on CPAP     No Known Allergies    Current Medications:    Current Outpatient Medications:     aspirin 81 MG EC tablet, Take 1 tablet by mouth Daily., Disp: , Rfl:     atorvastatin (LIPITOR) 80 MG tablet, Take 1 tablet by mouth Every Night., Disp: 30 tablet, Rfl: 0    Cholecalciferol (VITAMIN D3 PO), Take 1 tablet by mouth Daily., Disp: , Rfl:     ezetimibe (Zetia) 10 MG tablet, Take 1 tablet by mouth Daily., Disp: 30 tablet, Rfl: 0    fexofenadine (ALLEGRA) 180 MG tablet, Take 1 tablet by mouth Daily., Disp: , Rfl:     losartan (Cozaar) 25 MG tablet, Take 1 tablet by mouth Daily., Disp: 90 tablet, Rfl: 3    Misc Natural Products (OSTEO BI-FLEX TRIPLE STRENGTH PO), Take 1 tablet by  mouth Daily., Disp: , Rfl:     multivitamin with minerals tablet tablet, Take 1 tablet by mouth Daily., Disp: , Rfl:     Omega-3 Fatty Acids (FISH OIL PO), Take 1,400 mg by mouth Daily With Breakfast., Disp: , Rfl:     pantoprazole (PROTONIX) 40 MG EC tablet, Take 1 tablet by mouth Daily. \, Disp: , Rfl:     vitamin B-12 (CYANOCOBALAMIN) 1000 MCG tablet, Take 1 tablet by mouth Daily., Disp: , Rfl:     vitamin C (ASCORBIC ACID) 250 MG tablet, Take 1 tablet by mouth Daily., Disp: , Rfl:     Vitamin E 180 MG (400 UNIT) capsule capsule, Take 1 capsule by mouth Daily., Disp: , Rfl:     HPI    Ron Villa is a 68 y.o. male who presents today for a wound check follow up of AVD and cardiac risk factors. Since last visit, he has no complaints aside from the fact that the bandage itches.      The following portions of the patient's history were reviewed and updated as appropriate: allergies, current medications and problem list.    Pertinent positives as listed in the HPI.  All other systems reviewed are negative.         There were no vitals filed for this visit.    Physical Exam:  General: Alert and oriented.  The bandage was removed.  There is no drainage.  A small amount of erythema is present.  The stitch was removed without incident.    Diagnostic Data (reviewed with patient):  Lab Results   Component Value Date    GLUCOSE 113 (H) 06/08/2023    BUN 12 06/08/2023    CREATININE 0.87 06/08/2023    BCR 13.8 06/08/2023     06/08/2023    K 3.8 06/08/2023     06/08/2023    CO2 23.2 06/08/2023    CALCIUM 8.8 06/08/2023    ALBUMIN 3.9 06/08/2023    ALKPHOS 47 06/08/2023    AST 35 06/08/2023    ALT 34 06/08/2023     Lab Results   Component Value Date    CHOL 142 06/07/2023    TRIG 60 06/07/2023    HDL 40 06/07/2023    LDL 90 06/07/2023      Lab Results   Component Value Date    WBC 8.58 06/08/2023    RBC 4.93 06/08/2023    HGB 15.7 06/08/2023    HCT 47.8 06/08/2023    MCV 97.0 06/08/2023     (L) 06/08/2023       Lab Results   Component Value Date    TSH 1.550 06/06/2023        Advance Care Planning   ACP discussion was held with the patient during this visit. Patient does not have an advance directive, declines further assistance.         Procedures      Assessment:    ICD-10-CM ICD-9-CM   1. AVD (aortic valve disease)  I35.9 424.1   2. Carotid bruit, unspecified laterality  R09.89 785.9   3. Essential hypertension  I10 401.9   4. Dyslipidemia  E78.5 272.4         Plan:  Continue on aspirin 81 mg for antiplatelet therapy.   Continue on atorvastatin 80 mg daily for hyperlipidemia.   Continue on Zetia 10 mg daily for hyperlipidemia.   Continue on losartan 25 mg daily for hypertension.   Continue on Omega-3 fatty acids for hyperlipidemia.  Continue all other current medications.  F/up in 6 months w MDT, sooner if needed.      Dorie White MD, FACC

## 2024-06-12 ENCOUNTER — OFFICE VISIT (OUTPATIENT)
Dept: CARDIOLOGY | Facility: CLINIC | Age: 69
End: 2024-06-12
Payer: MEDICARE

## 2024-06-12 ENCOUNTER — PATIENT ROUNDING (BHMG ONLY) (OUTPATIENT)
Dept: CARDIOLOGY | Facility: CLINIC | Age: 69
End: 2024-06-12
Payer: MEDICARE

## 2024-06-12 VITALS
BODY MASS INDEX: 33.4 KG/M2 | SYSTOLIC BLOOD PRESSURE: 126 MMHG | OXYGEN SATURATION: 95 % | HEART RATE: 76 BPM | HEIGHT: 75 IN | WEIGHT: 268.6 LBS | DIASTOLIC BLOOD PRESSURE: 60 MMHG

## 2024-06-12 DIAGNOSIS — E78.5 DYSLIPIDEMIA: ICD-10-CM

## 2024-06-12 DIAGNOSIS — I10 ESSENTIAL HYPERTENSION: ICD-10-CM

## 2024-06-12 DIAGNOSIS — I35.9 AVD (AORTIC VALVE DISEASE): Primary | ICD-10-CM

## 2024-06-12 DIAGNOSIS — I35.0 AORTIC STENOSIS, SEVERE: ICD-10-CM

## 2024-06-12 DIAGNOSIS — R39.198 DIFFICULTY URINATING: ICD-10-CM

## 2024-06-12 PROCEDURE — 93000 ELECTROCARDIOGRAM COMPLETE: CPT | Performed by: PHYSICIAN ASSISTANT

## 2024-06-12 PROCEDURE — 99214 OFFICE O/P EST MOD 30 MIN: CPT | Performed by: PHYSICIAN ASSISTANT

## 2024-06-12 RX ORDER — TAMSULOSIN HYDROCHLORIDE 0.4 MG/1
1 CAPSULE ORAL DAILY
Qty: 30 CAPSULE | Refills: 0 | Status: SHIPPED | OUTPATIENT
Start: 2024-06-12

## 2024-06-12 RX ORDER — MELOXICAM 7.5 MG/1
1 TABLET ORAL EVERY 12 HOURS SCHEDULED
COMMUNITY
Start: 2024-05-03

## 2024-06-12 NOTE — PROGRESS NOTES
June 12, 2024    Hello, may I speak with Ron Villa?    My name is Marcio WATKINS.      I am  with [facility]  [address]  [place] [address] [phone].    Before we get started may I verify your date of birth? 1955    I am calling to officially welcome you to our practice and ask about your recent visit. Is this a good time to talk? yes    Tell me about your visit with us. What things went well?  It was all good       We're always looking for ways to make our patients' experiences even better. Do you have recommendations on ways we may improve?  no    Overall were you satisfied with your first visit to our practice? yes       I appreciate you taking the time to speak with me today. Is there anything else I can do for you? no      Thank you, and have a great day.

## 2024-08-01 NOTE — PROGRESS NOTES
Follow Up Office Visit      Encounter Date: 2024   Patient Name: Ron Villa  : 1955   MRN: [id number]   PCP: Geovanni Greene MD    Chief Complaint:    Chief Complaint   Patient presents with    History of stroke       History of Present Illness: Ron Villa is a 69 y.o. male with known medical diagnoses of aortic stenosis s/p TAVR, arthritis, GERD, prediabetes, DANNY, and recent stroke presents today for follow up.     Initial Event 2023:  Patient presented to ED on 2023 with generalized weakness, nausea/vomiting.  During course of work-up he had CT head which showed low attenuation in left medial inferior cerebellar hemisphere that was felt to be evolving infarction, late acute/early subacute, this was later confirmed to be subacute on MRI brain.  CTA head did not show any evidence of LVO.  Bilateral carotid ultrasound negative for any flow-limiting stenosis.  TTE showed EF 56 to 60%, mild aortic stenosis.  He also had an EEG performed which was reported as being normal study.  Patient was discharged home on DAPT with Plavix and aspirin for 3 weeks to be followed by aspirin monotherapy along with high intensity statin and Zetia for secondary stroke prevention.      OV 2023:  Today he denies any ataxia, no dizziness, no nausea.  He states he will have an occasional headache.  He states his vision seems somewhat blurry, especially in the morning.  He reports that he feels 90% back to his normal self.  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  He is fully compliant with his medications, he has not had any issues with cost.  He denies any new stroke/TIA symptoms.      OV 12/15/2023:  He denies any ongoing stroke symptoms including ataxia/nausea, he will occasionally feel somewhat off balance. 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  He is compliant with his medications, no issues with  cost.  He denies any new stroke/TIA symptoms.  He has since followed up with Dr. White cardiology.  Repeat TERRI on 11/21/2023 showed EF 55%, negative bubble study, no cardiac source of embolism was seen.  30-day monitoring negative for A-fib.  She spoke with the patient regarding a loop recorder to look for A-fib however that time he declined.  He can raise a question on whether his stroke was caused by taking Viagra.  I told him that is not a medication typically associated with strokes.    OV 08/02/2024:  Patient is compliant with his medication regimen, no issues with cost.  He denies any new stroke/TIA symptoms.  Denies any ongoing issues with balance.      Subjective        I have reviewed and the following portions of the patient's history were updated as appropriate: past family history, past medical history, past social history, past surgical history and problem list.    Medications:     Current Outpatient Medications:     aspirin 81 MG EC tablet, Take 1 tablet by mouth Daily., Disp: , Rfl:     atorvastatin (LIPITOR) 80 MG tablet, Take 1 tablet by mouth Every Night., Disp: 30 tablet, Rfl: 0    Cholecalciferol (VITAMIN D3 PO), Take 1 tablet by mouth Daily., Disp: , Rfl:     ezetimibe (Zetia) 10 MG tablet, Take 1 tablet by mouth Daily., Disp: 30 tablet, Rfl: 0    fexofenadine (ALLEGRA) 180 MG tablet, Take 1 tablet by mouth Daily., Disp: , Rfl:     losartan (Cozaar) 25 MG tablet, Take 1 tablet by mouth Daily., Disp: 90 tablet, Rfl: 3    meloxicam (MOBIC) 7.5 MG tablet, Take 1 tablet by mouth Every 12 (Twelve) Hours., Disp: , Rfl:     Misc Natural Products (OSTEO BI-FLEX TRIPLE STRENGTH PO), Take 1 tablet by mouth Daily., Disp: , Rfl:     multivitamin with minerals tablet tablet, Take 1 tablet by mouth Daily., Disp: , Rfl:     Omega-3 Fatty Acids (FISH OIL PO), Take 1,400 mg by mouth Daily With Breakfast., Disp: , Rfl:     pantoprazole (PROTONIX) 40 MG EC tablet, Take 1 tablet by mouth Daily. \, Disp: ,  Rfl:     tamsulosin (FLOMAX) 0.4 MG capsule 24 hr capsule, Take 1 capsule by mouth Daily., Disp: 30 capsule, Rfl: 0    vitamin B-12 (CYANOCOBALAMIN) 1000 MCG tablet, Take 1 tablet by mouth Daily., Disp: , Rfl:     vitamin C (ASCORBIC ACID) 250 MG tablet, Take 1 tablet by mouth Daily., Disp: , Rfl:     Vitamin E 180 MG (400 UNIT) capsule capsule, Take 1 capsule by mouth Daily., Disp: , Rfl:     Allergies:   No Known Allergies    Review of Systems   Constitutional:  Positive for fatigue.   Respiratory: Negative.     Cardiovascular: Negative.    Musculoskeletal:  Positive for arthralgias.   Skin: Negative.    Neurological:  Negative for dizziness, facial asymmetry, speech difficulty, weakness and numbness.   Hematological: Negative.    Psychiatric/Behavioral: Negative.          Objective     Physical Exam:   Vital Signs:   Vitals:    08/02/24 1424   BP: 147/83   BP Location: Right arm   Patient Position: Sitting   Cuff Size: Small Adult   Pulse: 67   SpO2: 94%   Weight: 118 kg (260 lb 12.8 oz)     Body mass index is 32.6 kg/m².    Physical Exam  Constitutional:       General: He is awake. He is not in acute distress.     Appearance: He is obese. He is not ill-appearing.   HENT:      Head: Normocephalic.      Mouth/Throat:      Mouth: Mucous membranes are moist.      Pharynx: Oropharynx is clear.   Eyes:      General: Lids are normal.      Extraocular Movements: Extraocular movements intact.      Pupils: Pupils are equal, round, and reactive to light.   Cardiovascular:      Rate and Rhythm: Normal rate.   Pulmonary:      Effort: Pulmonary effort is normal. No respiratory distress.   Musculoskeletal:         General: Normal range of motion.   Skin:     General: Skin is warm and dry.   Neurological:      General: No focal deficit present.      Mental Status: He is alert.      Motor: Motor strength is normal.     Coordination: Romberg sign negative.   Psychiatric:         Mood and Affect: Mood normal.         Speech:  Speech normal.         Behavior: Behavior normal.       Neurological Exam  Mental Status  Awake and alert. Oriented to person, place, time and situation. Recent and remote memory are intact. Speech is normal. Language is fluent with no aphasia. Attention and concentration are normal. Fund of knowledge is appropriate for level of education.    Cranial Nerves  CN II: Visual fields full to confrontation.  CN III, IV, VI: Extraocular movements intact bilaterally. Normal lids and orbits bilaterally. Pupils equal round and reactive to light bilaterally.  CN V: Facial sensation is normal.  CN VII: Full and symmetric facial movement.  CN IX, X: Palate elevates symmetrically  CN XI: Shoulder shrug strength is normal.  CN XII: Tongue midline without atrophy or fasciculations.    Motor  Normal muscle bulk throughout. Normal muscle tone. No abnormal involuntary movements. Strength is 5/5 throughout all four extremities.    Sensory  Light touch is normal in upper and lower extremities.     Coordination  Right: Finger-to-nose normal.Left: Finger-to-nose normal.    Gait  Casual gait is normal including stance, stride, and arm swing. Romberg is absent.       Procedures    NIH Stroke Scale    1a  Level of consciousness: 0=alert; keenly responsive   1b. LOC questions:  0=Answers both questions correctly    1c. LOC commands: 0=Performs both tasks correctly   2.  Best Gaze: 0=normal   3. Visual: 0=No visual loss   4. Facial Palsy: 0=Normal symmetric movement   5a. Motor left arm: 0=No drift, limb holds 90 (or 45) degrees for full 10 seconds   5b.  Motor right arm: 0=No drift, limb holds 90 (or 45) degrees for full 10 seconds   6a. Motor left le=No drift, limb holds 90 (or 45) degrees for full 10 seconds   6b  Motor right le=No drift, limb holds 90 (or 45) degrees for full 10 seconds   7. Limb Ataxia: 0=Absent   8.  Sensory: 0=Normal; no sensory loss   9. Best Language:  0=No aphasia, normal   10. Dysarthria: 0=Normal   11.  Extinction and Inattention: 0=No abnormality    Total:   0         Modified Van Zandt Scale: 0          0  No Symptoms    1 No significant disability. Able to carry out all usual activities, despite some symptoms.    2 Slight disability. Able to look after own affairs without assistance, but unable to carry out all previous activities.    3 Moderate disability. Requires some help, but able to walk unassisted.    4 Moderately severe disability. Unable to attend to own bodily needs without assistance, and unable to walk unassisted.    5 Severe disability. Requires constant nursing care and attention, bedridden, incontinent.    6 Dead          PHQ-9 Depression Screening  Little interest or pleasure in doing things? 0-->not at all   Feeling down, depressed, or hopeless? 0-->not at all   Trouble falling or staying asleep, or sleeping too much?     Feeling tired or having little energy?     Poor appetite or overeating?     Feeling bad about yourself - or that you are a failure or have let yourself or your family down?     Trouble concentrating on things, such as reading the newspaper or watching television?     Moving or speaking so slowly that other people could have noticed? Or the opposite - being so fidgety or restless that you have been moving around a lot more than usual?     Thoughts that you would be better off dead, or of hurting yourself in some way?     PHQ-9 Total Score 0   If you checked off any problems, how difficult have these problems made it for you to do your work, take care of things at home, or get along with other people?        Imaging Resluts:     No radiology results for the last 90 days.     Laboratory Results:    Lab Results   Component Value Date    HGBA1C 5.80 (H) 06/06/2023     Lab Results   Component Value Date    WBC 8.58 06/08/2023    HGB 15.7 06/08/2023    HCT 47.8 06/08/2023    MCV 97.0 06/08/2023     (L) 06/08/2023      Lab Results   Component Value Date    GLUCOSE 113 (H)  "06/08/2023    BUN 12 06/08/2023    CREATININE 0.87 06/08/2023    EGFRIFNONA 98 11/09/2021    BCR 13.8 06/08/2023    K 3.8 06/08/2023    CO2 23.2 06/08/2023    CALCIUM 8.8 06/08/2023    ALBUMIN 3.9 06/08/2023    AST 35 06/08/2023    ALT 34 06/08/2023      Lab Results   Component Value Date    CHOL 142 06/07/2023    CHOL 167 08/27/2021     Lab Results   Component Value Date    TRIG 60 06/07/2023    TRIG 89 08/27/2021     Lab Results   Component Value Date    HDL 40 06/07/2023    HDL 39 (L) 08/27/2021     Lab Results   Component Value Date    LDL 90 06/07/2023     (H) 08/27/2021      No results found for: \"XQGQMEXP39\"  No results found for: \"FOLATE\"  Lab Results   Component Value Date    TSH 1.550 06/06/2023       Assessment / Plan      Assessment/Plan:   Diagnoses and all orders for this visit:    1. History of stroke (Primary)    2. Prediabetes    3. DANNY (obstructive sleep apnea)    4. TAVR 11/08/21    5. AVD (aortic valve disease)       -Acute stroke to left cerebellum 06/2023, embolic appearing.  No hx of A-fib, is s/p TAVR, etiology undetermined.  TERRI and repeat neg for cardiac source of emboli, no a fib on 30 day monitoring.  Etiology remains unclear.  -Continue aspirin 81 mg daily  -Continue atorvastatin and Zetia  -BP today 147/83.  Reports it has been normal at other office visits.  I asked him to keep a log of his daily blood pressure readings and bring with him to his next PCP appointment.  He verbalized understanding of same.  -Stroke labs per PCP, reminded him of goals.  A1c < 7.0 and LDL < 70  -He is compliant with his CPAP   -Patient has ILR in place.  Reviewed results, no evidence of atrial arrhythmia    Discussed the importance of medication compliance and lifestyle modifications (adequate blood pressure control, adequate control of hyperlipidemia, adequate glycemic control, increase physical activity, and healthy diet) to help reduce the risk of future cerebrovascular events.  Also discussed " the signs symptoms that would warrant the patient return back to the emergency department including unilateral weakness, unilateral numbness, visual disturbances, loss of balance, speech difficulties, and/or a sudden severe headache.      Blood Pressure control to < 130/80  Goal LDL <70-recommend high dose statins  Goal A1c < 7.0  Call 911 for any stroke symptoms    Follow Up:   Return if symptoms worsen or fail to improve.      [facility] [place] [facility]    This document has been electronically signed by BAL Melo  August 2, 2024 14:33 EDT    Please note that portions of this note were completed with a voice recognition program. Efforts were made to edit dictation, but occasionally words are mistranscribed.

## 2024-08-02 ENCOUNTER — OFFICE VISIT (OUTPATIENT)
Dept: NEUROLOGY | Facility: CLINIC | Age: 69
End: 2024-08-02
Payer: MEDICARE

## 2024-08-02 VITALS
DIASTOLIC BLOOD PRESSURE: 83 MMHG | OXYGEN SATURATION: 94 % | HEART RATE: 67 BPM | WEIGHT: 260.8 LBS | BODY MASS INDEX: 32.6 KG/M2 | SYSTOLIC BLOOD PRESSURE: 147 MMHG

## 2024-08-02 DIAGNOSIS — R73.03 PREDIABETES: ICD-10-CM

## 2024-08-02 DIAGNOSIS — I35.9 AVD (AORTIC VALVE DISEASE): ICD-10-CM

## 2024-08-02 DIAGNOSIS — Z86.73 HISTORY OF STROKE: Primary | ICD-10-CM

## 2024-08-02 DIAGNOSIS — G47.33 OSA (OBSTRUCTIVE SLEEP APNEA): ICD-10-CM

## 2024-08-02 DIAGNOSIS — I35.0 AORTIC STENOSIS, SEVERE: ICD-10-CM

## 2024-08-30 RX ORDER — LOSARTAN POTASSIUM 25 MG/1
25 TABLET ORAL DAILY
Qty: 90 TABLET | Refills: 3 | Status: SHIPPED | OUTPATIENT
Start: 2024-08-30

## 2025-01-28 NOTE — PROGRESS NOTES
"[facility]    Patient ID: Ron Villa is a 69 y.o. male.  : 1955   Contact: [phone]    Encounter date: 2025    PCP: Geovanni Greene MD      Chief complaint:   Chief Complaint   Patient presents with    AVD (aortic valve disease)       Problem List:  Aortic stenosis  \"Heart murmur\" x 20 years   Symptoms of worsening chest pain and shortness of breath summer 2021  Stress MPS 2021: normal LVEF, small-sized mild degree of ischemia located in inferior wall, \"low-risk study\"  Echo 2021: EF 66-70%. Moderate AV calcification. Severe AS (peak velocity 408cm/sec, mean gradient 34mmHg, peak gradient 67 mmHg). Mild MR.  R/LHC, 2021: Normal LVSF and coronary arteries. Severe aortic stenosis. Near normal hemodynamics.    TERRI, 2021: Pre-TAVR. EF 55-60%. LVH. Trace MR and TR. Mild AI and AV appears bicuspid. Severe AS, pk/mn gradient 40/22mmHg. DENNY by continuity 0.79cm2. Aorta grade 3/5 atheroma.   TAVR/TERRI, 2021 (Dr. White): EF 60%. Placement  of a 29 mm Nix ANTHONY 3 pericardial prosthesis: small paravalvular leak is seen beneath the right coronary leaflet. Mean gradient 7 mmHg. Calculated valve area 2.4 cm². <1cm pericardial effusion, unchanged following procedure.   Echocardiogram, 2021: AV mean gradient 16 mmHg.  Normal LVEF. (Mean gradient was 7 mm at the time of implantation.)    Echocardiogram, 2022: EF 60%. Pericardial prosthesis is present in the aortic position. AV mean gradient 16 mmHg. No paravalvular leak seen. Started on Coumadin  Echo 22, [facility]: EF 60%, 29 mm ANTHONY 3 pericardial prothesis. Ao mean PG 12 mmHg.   Treadmill stress test, 2022; The patient reported shortness of breath but no chest discomfort during exercise. Oxygen saturation 97% with exercise. THR of 130 bpm met at 6:16. Mild reduction in exercise tolerance. Expected exercise duration 7:40. Actual exercise duration 6:20. Test stopped " "due to patient's inability to keep up with the pace and shortness of breath. Normal blood pressure response to exercise. No ST segment shift from baseline was seen with exercise. No evidence of inducible ischemia by clinical or electrocardiographic criteria.  Echocardiogram 8/11/2022: Normal LVEF.  29 mm ANTHONY 3 TAVR valve gradient 13 mmHg.  Echocardiogram, 12/05/2022; EF 65%. 29 mm Anthony 3 TAVR. Aortic valve area is 1.3 cm2. Mean gradient 15 mmHg. Trace TR. RVSP 18 mmHg.   Carotid US, 06/06/2023: No evidence of hemodynamically significant plaques or stenosis within the carotid system at this time.  Echo, 06/07/2023: EF 60%. Left ventricular diastolic function is consistent with (grade I) impaired relaxation. Mild AV stenosis.  MPS, 06/07/2023: EF 68%. Normal with no evidence of ischemia.  MCOT, 08/30/2023: SR/SB. No PACs. Frequent PVCs. Avg rate 67 bpm.  TERRI, 11/21/2023: EF 55%. There is a 29 mm ANTHONY 3 pericardial prosthesis present in the aortic position. AV MPG is 14 mmHg. DENNY is 1.8 cm2. Trace MR.   Echo, 02/21/2024: EF 55%. There is a 29 mm Anthony 3 TAVR valve present in the aortic position. AV mean PG is 14 mmHg. DENNY 1.5 cm2.  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  Osteoarthritis   DANNY on CPAP     No Known Allergies    Current Medications:    Current Outpatient Medications:     aspirin 81 MG EC tablet, Take 1 tablet by mouth Daily., Disp: , Rfl:     atorvastatin (LIPITOR) 80 MG tablet, Take 1 tablet by mouth Every Night., Disp: 30 tablet, Rfl: 0    B-D 3CC LUER-ELAINE SYR 25GX5/8\" 25G X 5/8\" 3 ML misc, Use TO INJECT testosterone, Disp: , Rfl:     Cholecalciferol (VITAMIN D3 PO), Take 1 tablet by mouth Daily., Disp: , Rfl:     ezetimibe (Zetia) 10 MG tablet, Take 1 tablet by mouth Daily., Disp: 30 tablet, Rfl: 0    fexofenadine (ALLEGRA) 180 MG tablet, Take 1 tablet by mouth Daily., Disp: , Rfl:     losartan (Cozaar) 25 MG " tablet, Take 1 tablet by mouth Daily., Disp: 90 tablet, Rfl: 3    meloxicam (MOBIC) 7.5 MG tablet, Take 1 tablet by mouth Every 12 (Twelve) Hours., Disp: , Rfl:     Misc Natural Products (OSTEO BI-FLEX TRIPLE STRENGTH PO), Take 1 tablet by mouth Daily., Disp: , Rfl:     multivitamin with minerals tablet tablet, Take 1 tablet by mouth Daily., Disp: , Rfl:     Omega-3 Fatty Acids (FISH OIL PO), Take 1,400 mg by mouth Daily With Breakfast., Disp: , Rfl:     pantoprazole (PROTONIX) 40 MG EC tablet, Take 1 tablet by mouth Daily. \, Disp: , Rfl:     Testosterone Cypionate (DEPOTESTOTERONE CYPIONATE) 200 MG/ML injection, Inject 1 mL into the appropriate muscle as directed by prescriber., Disp: , Rfl:     vitamin B-12 (CYANOCOBALAMIN) 1000 MCG tablet, Take 1 tablet by mouth Daily., Disp: , Rfl:     vitamin C (ASCORBIC ACID) 250 MG tablet, Take 1 tablet by mouth Daily., Disp: , Rfl:     Vitamin E 180 MG (400 UNIT) capsule capsule, Take 1 capsule by mouth Daily., Disp: , Rfl:     HPI    Ron Villa is a 69 y.o. male who presents today for a follow up of history of aortic stenosis s/p TAVR, history of CVA and cardiac risk factors. Since last visit, patient overall has been doing well.  He has had remote transmissions from his loop monitor showing no atrial fibrillation.  He does not check his blood pressure routinely but when he does it seems to be in the 130s.  He has had no significant chest pain or shortness of breath.  He does state every once in a while he will get short of breath while walking up the stairs but has quick recovery.  He has no associated lower extremity edema or orthopnea.  Patient does comment that he has had some erectile dysfunction.  He states that he does take Viagra for this issue.  He also states he has some prostate issues with some dribbling but was told in the past that this may be related to a back injury.  He has not been seen by a urologist.  He was also recently found to have low  "testosterone and this is being supplemented currently.      The following portions of the patient's history were reviewed and updated as appropriate: allergies, current medications and problem list.    Pertinent positives as listed in the HPI.  All other systems reviewed are negative.         Vitals:    01/29/25 1545   BP: 134/60   BP Location: Left arm   Patient Position: Sitting   Cuff Size: Adult   Pulse: 73   SpO2: 95%   Weight: 121 kg (266 lb 3.2 oz)   Height: 190.5 cm (75\")       Physical Exam:  General: Alert and oriented.  Neck: Jugular venous pressure is within normal limits. Carotids have normal upstrokes without bruits.   Cardiovascular: Regular rate and rhythm with no murmur  Lungs: Clear, no rales or wheezes. Equal expansion is noted.   Extremities: Show no edema.  Skin: Warm and dry.  Neurologic: Nonfocal.     Diagnostic Data (reviewed with patient):    Lab date: 05/09/2024  FLP: TC 70, TG 72, HDL 32, LDL 22  CMP: Glu 113, BUN 15, Creat 0.99, eGFR 82, Na 140, K 4.9, Cl 104, CO2 22, Ca 8.7, Alk Phos 49, AST 33, ALT 46  CBC: WBC 5.5, RBC 4.86, HGB 15.5, HCT 46.1, MCV 95, MCH 31.9,   HbA1c: 6.2  TSH: 1.330     Advance Care Planning   ACP discussion was declined by the patient. Patient does not have an advance directive, declines further assistance.       Procedures  Loop device interrogation shows no events.  No atrial fibrillation.  Sinus rhythm at 70 bpm      Assessment:    ICD-10-CM ICD-9-CM   1. AVD (aortic valve disease)  I35.9 424.1   2. Essential hypertension  I10 401.9   3. Dyslipidemia  E78.5 272.4   4. TAVR 11/08/21  I35.0 424.1         Plan:  Stable cardiac status with no signs of heart failure and no atrial fibrillation on loop monitor at this time for history of CVA  Continue on aspirin 81 mg daily for antiplatelet therapy.   Continue on atorvastatin 80 mg and Zetia 10 mg nightly for hyperlipidemia.  Cholesterol is to goal  Continue on losartan 25 mg daily for hypertension.  And I have " encouraged the patient to check his blood pressure once a week if he sees a trend of elevation he needs to call us for further adjustments to his medications.  I have encouraged the patient to discuss with his primary care provider about his erectile dysfunction again and he may need a referral to urology.  I do not think it is cardiac in nature given patient's history of a normal heart catheterization in 2021 prior to his TAVR.  Continue all other current medications.  F/up in 12 months, sooner if needed.    Nichole Rowland PA-C

## 2025-01-29 ENCOUNTER — OFFICE VISIT (OUTPATIENT)
Dept: CARDIOLOGY | Facility: CLINIC | Age: 70
End: 2025-01-29
Payer: MEDICARE

## 2025-01-29 VITALS
HEIGHT: 75 IN | DIASTOLIC BLOOD PRESSURE: 60 MMHG | BODY MASS INDEX: 33.1 KG/M2 | SYSTOLIC BLOOD PRESSURE: 134 MMHG | HEART RATE: 73 BPM | OXYGEN SATURATION: 95 % | WEIGHT: 266.2 LBS

## 2025-01-29 DIAGNOSIS — I35.9 AVD (AORTIC VALVE DISEASE): Primary | ICD-10-CM

## 2025-01-29 DIAGNOSIS — Z86.73 HISTORY OF CVA (CEREBROVASCULAR ACCIDENT): ICD-10-CM

## 2025-01-29 DIAGNOSIS — I10 ESSENTIAL HYPERTENSION: ICD-10-CM

## 2025-01-29 DIAGNOSIS — I35.0 AORTIC STENOSIS, SEVERE: ICD-10-CM

## 2025-01-29 DIAGNOSIS — E78.5 DYSLIPIDEMIA: ICD-10-CM

## 2025-01-29 PROCEDURE — 99214 OFFICE O/P EST MOD 30 MIN: CPT | Performed by: PHYSICIAN ASSISTANT

## 2025-01-29 RX ORDER — SYRINGE WITH NEEDLE, 1 ML 25GX5/8"
SYRINGE, EMPTY DISPOSABLE MISCELLANEOUS
COMMUNITY
Start: 2025-01-20

## 2025-01-29 RX ORDER — TESTOSTERONE CYPIONATE 200 MG/ML
200 INJECTION, SOLUTION INTRAMUSCULAR
COMMUNITY
Start: 2025-01-20

## 2025-06-19 ENCOUNTER — TELEPHONE (OUTPATIENT)
Dept: CARDIOLOGY | Facility: CLINIC | Age: 70
End: 2025-06-19
Payer: MEDICARE

## 2025-06-19 NOTE — TELEPHONE ENCOUNTER
Patient called and left voicemail regarding DOT paperwork. Attempted to call patient back, no answer. LVM, awaiting call back.

## 2025-06-19 NOTE — TELEPHONE ENCOUNTER
Patient called to see if we have gotten DOT paperwork, stated that we have not and gave patient fax number of [phone].

## 2025-06-20 LAB
MDC_IDC_PG_IMPLANT_DTM: NORMAL
MDC_IDC_PG_MFG: NORMAL
MDC_IDC_PG_MODEL: NORMAL
MDC_IDC_PG_SERIAL: NORMAL
MDC_IDC_PG_TYPE: NORMAL
MDC_IDC_SESS_DTM: NORMAL
MDC_IDC_SESS_TYPE: NORMAL

## 2025-06-23 NOTE — TELEPHONE ENCOUNTER
Patient left voice mail message about DOT paperwork.  He states he can not drive until this is filled out.  He has been unable to drive since 6/13/25.  I will get [facility] to sign paperwork and fax it tomorrow.  He verbalizes  understanding.

## 2025-06-30 ENCOUNTER — TELEPHONE (OUTPATIENT)
Dept: NEUROLOGY | Facility: CLINIC | Age: 70
End: 2025-06-30
Payer: MEDICARE

## 2025-06-30 NOTE — TELEPHONE ENCOUNTER
"Caller: Ron Villa \"Jose\"    Relationship: Self    Best call back number: [phone]    What was the call regarding: PT STATES HIS DOT OFFICE IN [place], [place] FAXED HIS DOT PHYSICAL PPW TO THE OFFICE ON EITHER THURSDAY OR FRIDAY LAST WEEK. PT WOULD LIKE TO CONFIRM IF OFFICE HAS RECEIVED THE PPW AND OBTAIN STATUS UPDATE.    PPW, ONCE COMPLETED, CAN BE FAXED TO FAX # [phone].    Do you require a callback: YES, PLEASE.    PLEASE REVIEW AND ADVISE.  "

## 2025-06-30 NOTE — TELEPHONE ENCOUNTER
Returned Mr. Villa's call. The fax number was given for his PPW to be faxed to. He is aware that our office can not fill the PPW out until he is seen in our clinic. His Appt is 8/15/25 at 9:30AM.

## 2025-08-15 ENCOUNTER — OFFICE VISIT (OUTPATIENT)
Dept: NEUROLOGY | Facility: CLINIC | Age: 70
End: 2025-08-15
Payer: MEDICARE

## 2025-08-15 VITALS
WEIGHT: 259.2 LBS | OXYGEN SATURATION: 95 % | BODY MASS INDEX: 32.4 KG/M2 | HEART RATE: 73 BPM | SYSTOLIC BLOOD PRESSURE: 126 MMHG | DIASTOLIC BLOOD PRESSURE: 67 MMHG

## 2025-08-15 DIAGNOSIS — G47.33 OSA (OBSTRUCTIVE SLEEP APNEA): ICD-10-CM

## 2025-08-15 DIAGNOSIS — E78.5 HYPERLIPIDEMIA, UNSPECIFIED HYPERLIPIDEMIA TYPE: ICD-10-CM

## 2025-08-15 DIAGNOSIS — Z86.73 HISTORY OF STROKE: Primary | ICD-10-CM

## 2025-08-27 LAB
MDC_IDC_PG_IMPLANT_DTM: NORMAL
MDC_IDC_PG_MFG: NORMAL
MDC_IDC_PG_MODEL: NORMAL
MDC_IDC_PG_SERIAL: NORMAL
MDC_IDC_PG_TYPE: NORMAL
MDC_IDC_SESS_DTM: NORMAL
MDC_IDC_SESS_TYPE: NORMAL

## (undated) DEVICE — AIRLIFE™ UNIVERSAL OXYGEN NUT AND NIPPLE CONNECTION: Brand: AIRLIFE™

## (undated) DEVICE — SUT SILK 0 SH 30IN K834H

## (undated) DEVICE — PERCLOSE PROGLIDE™ SUTURE-MEDIATED CLOSURE SYSTEM: Brand: PERCLOSE PROGLIDE™

## (undated) DEVICE — PATIENT RETURN ELECTRODE, SINGLE-USE, CONTACT QUALITY MONITORING, ADULT, WITH 9FT CORD, FOR PATIENTS WEIGING OVER 33LBS. (15KG): Brand: MEGADYNE

## (undated) DEVICE — SUT PROLN 6/0 C1 D/A 30IN 8706H

## (undated) DEVICE — KT MANIFOLD CATHLAB CUST

## (undated) DEVICE — ANGIO-SEAL VIP VASCULAR CLOSURE DEVICE: Brand: ANGIO-SEAL

## (undated) DEVICE — PK CATH CARD 10

## (undated) DEVICE — RADIFOCUS GLIDEWIRE: Brand: GLIDEWIRE

## (undated) DEVICE — DECANTER BAG 9": Brand: MEDLINE INDUSTRIES, INC.

## (undated) DEVICE — GW AMPLTZ SUPERSTIFF STR .035IN 180CM

## (undated) DEVICE — SUT MONOCRYL PLS ANTIB UND 3/0  PS1 27IN

## (undated) DEVICE — SLV REPOSTNG CATH STRL 60CM

## (undated) DEVICE — GLV SURG PREMIERPRO MIC LTX PF SZ6.5 BRN

## (undated) DEVICE — 3M™ STERI-DRAPE™ INSTRUMENT POUCH 1018: Brand: STERI-DRAPE™

## (undated) DEVICE — HDRST POSTIN FM CRDL TRACH SLOT 9X8X4IN

## (undated) DEVICE — DRAPE,TOP,102X53,STERILE: Brand: MEDLINE

## (undated) DEVICE — BOOT SUT XRAY DETECT STD YEL/BLU CA/50

## (undated) DEVICE — SENSR CERBRL O2 PK/2

## (undated) DEVICE — 3M™ STERI-DRAPE™ FLUOROSCOPE DRAPE, 10 PER CARTON / 4 CARTONS PER CASE, 1012: Brand: STERI-DRAPE™

## (undated) DEVICE — NDL PERC 1PRT THNWALL W/BASEPLT 18G 7CM

## (undated) DEVICE — ADHS SKIN PREMIERPRO EXOFIN TOPICAL HI/VISC .5ML

## (undated) DEVICE — CATH DIAG EXPO M/ PK 6FR FL4/FR4 PIG 3PK

## (undated) DEVICE — CLTH CLENS READYCLEANSE PERI CARE PK/5

## (undated) DEVICE — ANTIBACTERIAL UNDYED BRAIDED (POLYGLACTIN 910), SYNTHETIC ABSORBABLE SUTURE: Brand: COATED VICRYL

## (undated) DEVICE — ELECTRD DEFIB M/FUNC PROPADZ STRL 2PK

## (undated) DEVICE — CABL PACE ATRIAL PT BLU

## (undated) DEVICE — SUT SILK 2/0 TIES 18IN A185H

## (undated) DEVICE — Device

## (undated) DEVICE — ELECTRD BLD EZ CLN STD 2.5IN

## (undated) DEVICE — INTENDED FOR TISSUE SEPARATION, AND OTHER PROCEDURES THAT REQUIRE A SHARP SURGICAL BLADE TO PUNCTURE OR CUT.: Brand: BARD-PARKER ® STAINLESS STEEL BLADES

## (undated) DEVICE — BLANKT WARM UNDER/BDY A/ 100X195CM DISP LF

## (undated) DEVICE — GW CERBRL FC PTFE STD/STR .035 260CM

## (undated) DEVICE — CATH DIAG EXPO .056 AL1 6F 100CM

## (undated) DEVICE — SUT SILK 4/0 TIES 18IN A183H

## (undated) DEVICE — SHEET, DRAPE, SPLIT, STERILE: Brand: MEDLINE

## (undated) DEVICE — GLIDEX™ COATED HYDROPHILIC GUIDEWIRE: Brand: MAGIC TORQUE™

## (undated) DEVICE — SUT SILK 3/0 TIES 18IN A184H

## (undated) DEVICE — ANGIOGRAPHIC CATHETER: Brand: EXPO™

## (undated) DEVICE — COVER,MAYO STAND,XL,STERILE: Brand: MEDLINE

## (undated) DEVICE — BOWL UTIL STRL 32OZ

## (undated) DEVICE — INTRO SHEATH ART/FEM ENGAGE .038 6F12CM

## (undated) DEVICE — CATH GUIDE BERN 5F 65CM

## (undated) DEVICE — SUT PROLN 7/0 BV1 24IN 4PK M8702

## (undated) DEVICE — PENCL ROCKRSWCH MEGADYNE W/HOLSTR 10FT SS

## (undated) DEVICE — PK MINOR SPLT 10

## (undated) DEVICE — SI AVANTI+ 7F STD W/GW  NO OBT: Brand: AVANTI

## (undated) DEVICE — GW FC J TFE/COAT .025 3MM 180CM

## (undated) DEVICE — APPL CHLORAPREP TINTED 26ML TEAL

## (undated) DEVICE — PINNACLE INTRODUCER SHEATH: Brand: PINNACLE

## (undated) DEVICE — SUT PROLN 4/0 BB D/A 36IN 8581H

## (undated) DEVICE — PAD ARMBRD SURG CONVOL 7.5X20X2IN

## (undated) DEVICE — PROVIDES A STERILE INTERFACE BETWEEN THE OPERATING ROOM SURGICAL LAMPS (NON-STERILE) AND THE SURGEON OR NURSE (STERILE).: Brand: STERION®CLAMP COVER FABRIC

## (undated) DEVICE — SWAN-GANZ THERMODILUTION CATHETER: Brand: SWAN-GANZ

## (undated) DEVICE — GW PERIPH VASC ADX J/TP SS .035 150CM 3MM

## (undated) DEVICE — CATH PACE PACEL BIPOL 5F110CM

## (undated) DEVICE — 3M™ IOBAN™ 2 ANTIMICROBIAL INCISE DRAPE 6651EZ: Brand: IOBAN™ 2

## (undated) DEVICE — SYR LUERLOK 50ML

## (undated) DEVICE — SYR LUERLOK 30CC

## (undated) DEVICE — COVER,TABLE,HVY DUTY,60"X90",STRL: Brand: MEDLINE

## (undated) DEVICE — CO-SET DELIVERY SYSTEM FOR 123 ROOM TEMPATURE INJECTATE: Brand: CO-SET+

## (undated) DEVICE — INTRO SHEATH ENGAGE W/50 GW .038 8F12

## (undated) DEVICE — GW SAFARI2 PRESH XSM CRV .035IN 3.2X2.9X275CM

## (undated) DEVICE — SUCTION CANISTER, 2500CC, RIGID: Brand: DEROYAL

## (undated) DEVICE — PK ATS CUST W CARDIOTOMY RESEVOIR